# Patient Record
Sex: FEMALE | Race: WHITE | NOT HISPANIC OR LATINO | Employment: FULL TIME | URBAN - NONMETROPOLITAN AREA
[De-identification: names, ages, dates, MRNs, and addresses within clinical notes are randomized per-mention and may not be internally consistent; named-entity substitution may affect disease eponyms.]

---

## 2018-01-30 ENCOUNTER — DOCUMENTATION ONLY (OUTPATIENT)
Dept: FAMILY MEDICINE | Facility: OTHER | Age: 28
End: 2018-01-30

## 2018-01-30 PROBLEM — L25.9 CONTACT DERMATITIS AND ECZEMA: Status: ACTIVE | Noted: 2018-01-30

## 2018-01-30 PROBLEM — J30.1 ALLERGIC RHINITIS DUE TO POLLEN: Status: ACTIVE | Noted: 2018-01-30

## 2018-01-30 PROBLEM — N92.0 MENORRHAGIA: Status: ACTIVE | Noted: 2018-01-30

## 2018-01-30 RX ORDER — NAPROXEN 500 MG/1
500 TABLET ORAL 2 TIMES DAILY WITH MEALS
Status: ON HOLD | COMMUNITY
Start: 2014-01-09 | End: 2022-03-16

## 2018-01-30 RX ORDER — ONDANSETRON 8 MG/1
8 TABLET, ORALLY DISINTEGRATING ORAL EVERY 8 HOURS PRN
COMMUNITY
Start: 2017-03-28 | End: 2018-02-19

## 2018-01-30 RX ORDER — METHYLPHENIDATE HYDROCHLORIDE 40 MG/1
40 CAPSULE, EXTENDED RELEASE ORAL DAILY
COMMUNITY
Start: 2015-12-30 | End: 2018-02-20

## 2018-01-30 RX ORDER — TRIAMCINOLONE ACETONIDE 1 MG/G
CREAM TOPICAL
COMMUNITY
Start: 2015-06-15 | End: 2019-07-02

## 2018-01-30 RX ORDER — MEDROXYPROGESTERONE ACETATE 150 MG/ML
150 INJECTION, SUSPENSION INTRAMUSCULAR
COMMUNITY
Start: 2015-06-01 | End: 2018-04-23

## 2018-02-19 ENCOUNTER — OFFICE VISIT (OUTPATIENT)
Dept: INTERNAL MEDICINE | Facility: OTHER | Age: 28
End: 2018-02-19
Attending: NURSE PRACTITIONER
Payer: COMMERCIAL

## 2018-02-19 VITALS
BODY MASS INDEX: 37.21 KG/M2 | SYSTOLIC BLOOD PRESSURE: 130 MMHG | HEIGHT: 63 IN | DIASTOLIC BLOOD PRESSURE: 80 MMHG | TEMPERATURE: 97.6 F | WEIGHT: 210 LBS

## 2018-02-19 DIAGNOSIS — L98.492: Primary | ICD-10-CM

## 2018-02-19 PROBLEM — N92.1 MENORRHAGIA WITH IRREGULAR CYCLE: Status: ACTIVE | Noted: 2018-01-30

## 2018-02-19 PROBLEM — L92.8 OTHER GRANULOMATOUS DISORDERS OF THE SKIN AND SUBCUTANEOUS TISSUE: Status: ACTIVE | Noted: 2017-09-26

## 2018-02-19 PROBLEM — K62.89 RECTAL MASS: Status: ACTIVE | Noted: 2017-05-26

## 2018-02-19 PROBLEM — K62.89 MASS OF PERIRECTAL SOFT TISSUE: Status: ACTIVE | Noted: 2017-04-12

## 2018-02-19 PROCEDURE — 99203 OFFICE O/P NEW LOW 30 MIN: CPT | Performed by: NURSE PRACTITIONER

## 2018-02-19 PROCEDURE — G0463 HOSPITAL OUTPT CLINIC VISIT: HCPCS

## 2018-02-19 ASSESSMENT — PAIN SCALES - GENERAL: PAINLEVEL: SEVERE PAIN (6)

## 2018-02-19 NOTE — NURSING NOTE
Tia Vázquez is a 27 year old female who presents today for wound care consult for a surgical incision on her buttocks. She had surgery to remove her tail bone in June 2017.  Niya Matta LPN.......2/19/2018.......11:07 AM

## 2018-02-19 NOTE — MR AVS SNAPSHOT
After Visit Summary   2/19/2018    Tia Vázquez    MRN: [id number]           Patient Information     Date Of Birth          1990        Visit Information        Provider Department      2/19/2018 11:00 AM Kiki Hu NP [facility]         Follow-ups after your visit        Your next 10 appointments already scheduled     Feb 20, 2018  3:15 PM CST   Office Visit with Nicho Peoples MD   [facility] ([facility])    [address]  [facility] [id number] [phone]           Bring a current list of meds and any records pertaining to this visit. For Physicals, please bring immunization records and any forms needing to be filled out. Please arrive 10 minutes early to complete paperwork.            Feb 26, 2018  7:40 AM CST   SHORT with Kiki Hu NP   [facility] ([facility])    [address]  [place] [id number] [phone]              Who to contact     If you have questions or need follow up information about today's clinic visit or your schedule please contact [facility] directly at [phone].  Normal or non-critical lab and imaging results will be communicated to you by BrewDoghart, letter or phone within 4 business days after the clinic has received the results. If you do not hear from us within 7 days, please contact the clinic through BrewDoghart or phone. If you have a critical or abnormal lab result, we will notify you by phone as soon as possible.  Submit refill requests through Zentact or call your pharmacy and they will forward the refill request to us. Please allow 3 business days for your refill to be completed.          Additional Information About Your Visit        BrewDogharOctapoly Information     Zentact lets you send messages to your doctor, view your test results, renew your prescriptions, schedule  "appointments and more. To sign up, go to [email or web address] . Click on \"Log in\" on the left side of the screen, which will take you to the Welcome page. Then click on \"Sign up Now\" on the right side of the page.     You will be asked to enter the access code listed below, as well as some personal information. Please follow the directions to create your username and password.     Your access code is: [email or web address]  Expires: 2018 11:37 AM     Your access code will  in 90 days. If you need help or a new code, please call your [place] clinic or [phone].        Care EveryWhere ID     This is your Care EveryWhere ID. This could be used by other organizations to access your [place] medical records  [id number]        Your Vitals Were     Temperature Height Breastfeeding? BMI (Body Mass Index)          97.6  F (36.4  C) (Tympanic) 1.6 m (5' 3\") No 37.2 kg/m2         Blood Pressure from Last 3 Encounters:   18 130/80   06/05/15 124/82   14 132/86    Weight from Last 3 Encounters:   18 95.3 kg (210 lb)   06/05/15 73.6 kg (162 lb 3.2 oz)   14 64.4 kg (142 lb)              Today, you had the following     No orders found for display       Primary Care Provider Office Phone # Fax #    Neiva Dwight [phone]       [address] [address]        Equal Access to Services     [facility] : Hadii ana mercer hadasho Sojeanne, waaxda luqadaha, qaybta kaalmada adehamilton, stephanie quach . So [facility] [phone].    ATENCIÓN: Si habla español, tiene a harris disposición servicios gratuitos de asistencia lingüística. Llame al [phone].    We comply with applicable federal civil rights laws and [place] laws. We do not discriminate on the basis of race, color, national origin, age, disability, sex, sexual orientation, or gender identity.            Thank you!     Thank you for choosing [facility]  for " your care. Our goal is always to provide you with excellent care. Hearing back from our patients is one way we can continue to improve our services. Please take a few minutes to complete the written survey that you may receive in the mail after your visit with us. Thank you!             Your Updated Medication List - Protect others around you: Learn how to safely use, store and throw away your medicines at www.disposemymeds.org.          This list is accurate as of 2/19/18 11:37 AM.  Always use your most recent med list.                   Brand Name Dispense Instructions for use Diagnosis    medroxyPROGESTERone 150 MG/ML injection    DEPO-PROVERA     Inject 150 mg into the muscle every 3 months        methylphenidate 40 MG Cp24    RITALIN LA     Take 40 mg by mouth daily        naproxen 500 MG tablet    NAPROSYN     Take 500 mg by mouth 2 times daily (with meals)        triamcinolone 0.1 % cream    KENALOG     Apply  topically to affected area(s) 3 times daily.

## 2018-02-19 NOTE — PROGRESS NOTES
"CLINIC VISIT WOUND CARE    Subjective:  This is a followup of a 27 year old female patient with a evaluation and management of chronic ulcer of the sacral region.  Patient had a retro-rectal mass that was excised back in September 2017.  Incision did not heal appropriately.  She has been managed by wound care clinic in the [facility].  She just recently moved back to this area.  Her last visit at wound care clinic was on February 6.  On that date of service wound measures 2 x 0.5 x 0.5 cm.  Patient reports that she still has chronic pain of the area about a 6 out of 10.  She states she manages this pain by taking naproxen and by changing positions.  Sometimes she will apply gauze soaked with lidocaine to the area but only feels some numbness but really no improvement of the pain.  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  She denies fever and chills.  She feels that the drainage is improving.  She has had no recent wound infections.  She does dressing changes daily.  Last dressing change was done at home this morning by herself.  Normally her significant other does dressing change for her.    Past Medical History:   Diagnosis Date     Helicobacter pylori infection     No Comments Provided     Past Surgical History:   Procedure Laterality Date     OTHER SURGICAL HISTORY      46739.0,PAST SURGICAL HISTORY,Unremarkable     Other  [no clinical screening - see comments]  Current Outpatient Prescriptions   Medication     medroxyPROGESTERone (DEPO-PROVERA) 150 MG/ML injection     methylphenidate (RITALIN LA) 40 MG CP24     naproxen (NAPROSYN) 500 MG tablet     triamcinolone (KENALOG) 0.1 % cream     No current facility-administered medications for this visit.        Review of Systems:  Review of Systems  See HPI  Objective:   /80 (BP Location: Right arm, Patient Position: Sitting, Cuff Size: Adult Large)  Temp 97.6  F (36.4  C) (Tympanic)  Ht 5' 3\" (1.6 m)  Wt " 210 lb (95.3 kg)  Breastfeeding? No  BMI 37.2 kg/m2  Physical Exam     Wound Type:  Postsurgical  Location:  Gluteal crease  Wound Measurements:  2.4 x 0.4 x 0.4 cm  Description:  Beefy red granulating wound bed, no hyper granulation tissue.  Drainage:  Small to moderate amount of serosanguineous, nonodorous drainage.  Periwound Tissue:  No surrounding erythema, warmth or maceration.  Treatment:  Wound irrigated with saline wash, calcium alginate applied to wound followed by Allevyn gentle border light dressing.    Assessment:     ICD-10-CM    1. Chronic ulcer of sacral region with fat layer exposed (H) L98.492        Plan:   Patient's wound is healing nicely.  No hypergranulation tissue noted today at this visit.  Since this is her first visit and treatment was slightly changed then would recommend one-week follow-up to make sure wound is continuing to show improvement.  If she develops increased pain, fever, redness or warmth around wound or any purulent drainage needs to be seen sooner.  If ongoing while next week then will begin seeing her in clinic every 2-3 weeks.    KERMIT Presley   2/19/2018  11:41 AM

## 2018-02-20 ENCOUNTER — DOCUMENTATION ONLY (OUTPATIENT)
Dept: FAMILY MEDICINE | Facility: OTHER | Age: 28
End: 2018-02-20

## 2018-02-20 ENCOUNTER — OFFICE VISIT (OUTPATIENT)
Dept: FAMILY MEDICINE | Facility: OTHER | Age: 28
End: 2018-02-20
Attending: FAMILY MEDICINE
Payer: COMMERCIAL

## 2018-02-20 VITALS
HEART RATE: 104 BPM | DIASTOLIC BLOOD PRESSURE: 88 MMHG | WEIGHT: 211 LBS | SYSTOLIC BLOOD PRESSURE: 136 MMHG | HEIGHT: 63 IN | BODY MASS INDEX: 37.39 KG/M2

## 2018-02-20 DIAGNOSIS — D69.9 BLEEDING DISORDER (H): ICD-10-CM

## 2018-02-20 DIAGNOSIS — L98.492: ICD-10-CM

## 2018-02-20 DIAGNOSIS — Z79.899 CONTROLLED SUBSTANCE AGREEMENT SIGNED: ICD-10-CM

## 2018-02-20 DIAGNOSIS — F90.9 ATTENTION DEFICIT HYPERACTIVITY DISORDER (ADHD), UNSPECIFIED ADHD TYPE: Primary | ICD-10-CM

## 2018-02-20 PROBLEM — K62.89 RECTAL MASS: Status: RESOLVED | Noted: 2017-05-26 | Resolved: 2018-02-20

## 2018-02-20 PROCEDURE — G0463 HOSPITAL OUTPT CLINIC VISIT: HCPCS

## 2018-02-20 PROCEDURE — 99214 OFFICE O/P EST MOD 30 MIN: CPT | Performed by: FAMILY MEDICINE

## 2018-02-20 RX ORDER — METHYLPHENIDATE HYDROCHLORIDE 40 MG/1
40 CAPSULE, EXTENDED RELEASE ORAL DAILY
Qty: 30 CAPSULE | Refills: 0 | Status: SHIPPED | OUTPATIENT
Start: 2018-02-20 | End: 2018-04-24

## 2018-02-20 RX ORDER — METHYLPHENIDATE HYDROCHLORIDE 40 MG/1
40 CAPSULE, EXTENDED RELEASE ORAL DAILY
Qty: 30 CAPSULE | Refills: 0 | Status: SHIPPED | OUTPATIENT
Start: 2018-02-20 | End: 2019-06-10

## 2018-02-20 RX ORDER — METHYLPHENIDATE HYDROCHLORIDE 40 MG/1
40 CAPSULE, EXTENDED RELEASE ORAL DAILY
Qty: 30 CAPSULE | Refills: 0 | Status: SHIPPED | OUTPATIENT
Start: 2018-02-20 | End: 2018-02-20

## 2018-02-20 ASSESSMENT — PATIENT HEALTH QUESTIONNAIRE - PHQ9: SUM OF ALL RESPONSES TO PHQ QUESTIONS 1-9: 0

## 2018-02-20 ASSESSMENT — PAIN SCALES - GENERAL: PAINLEVEL: MODERATE PAIN (5)

## 2018-02-20 NOTE — NURSING NOTE
She is here today for medication refill.  Leanne Recinos LPN..................2/20/2018   2:57 PM

## 2018-02-20 NOTE — MR AVS SNAPSHOT
"              After Visit Summary   2/20/2018    Tia Vázquez    MRN: [id number]           Patient Information     Date Of Birth          1990        Visit Information        Provider Department      2/20/2018 3:15 PM Nicho Peoples MD [facility]        Today's Diagnoses     Attention deficit hyperactivity disorder (ADHD), unspecified ADHD type    -  1    Chronic ulcer of sacral region with fat layer exposed (H)        Controlled substance agreement signed        Bleeding disorder, not specified           Follow-ups after your visit        Your next 10 appointments already scheduled     Feb 26, 2018  7:40 AM CST   SHORT with Kiki Hu NP   [facility]    [address]  [place] [address] [phone]              Who to contact     If you have questions or need follow up information about today's clinic visit or your schedule please contact [facility] directly at [phone].  Normal or non-critical lab and imaging results will be communicated to you by Mobile Roadiehart, letter or phone within 4 business days after the clinic has received the results. If you do not hear from us within 7 days, please contact the clinic through DocDoct or phone. If you have a critical or abnormal lab result, we will notify you by phone as soon as possible.  Submit refill requests through The Luxe Nomad or call your pharmacy and they will forward the refill request to us. Please allow 3 business days for your refill to be completed.          Additional Information About Your Visit        Mobile Roadiehart Information     The Luxe Nomad lets you send messages to your doctor, view your test results, renew your prescriptions, schedule appointments and more. To sign up, go to [email or web address] . Click on \"Log in\" on the left side of the screen, which will take you to the Welcome page. Then click on \"Sign up Now\" on the " "right side of the page.     You will be asked to enter the access code listed below, as well as some personal information. Please follow the directions to create your username and password.     Your access code is: [email or web address]  Expires: 2018 11:37 AM     Your access code will  in 90 days. If you need help or a new code, please call your [place] clinic or [phone].        Care EveryWhere ID     This is your Care EveryWhere ID. This could be used by other organizations to access your [place] medical records  [id number]        Your Vitals Were     Pulse Height Breastfeeding? BMI (Body Mass Index)          104 5' 3\" (1.6 m) No 37.38 kg/m2         Blood Pressure from Last 3 Encounters:   18 136/88   18 130/80   06/05/15 124/82    Weight from Last 3 Encounters:   18 211 lb (95.7 kg)   18 210 lb (95.3 kg)   06/05/15 162 lb 3.2 oz (73.6 kg)              We Performed the Following     Pain Drug Scr UR W Rptd Meds          Today's Medication Changes          These changes are accurate as of 18  3:52 PM.  If you have any questions, ask your nurse or doctor.               Start taking these medicines.        Dose/Directions    * methylphenidate 40 MG Cp24   Commonly known as:  RITALIN LA   Used for:  Attention deficit hyperactivity disorder (ADHD), unspecified ADHD type, Controlled substance agreement signed   Started by:  Nicho Peoples MD        Dose:  40 mg   Take 40 mg by mouth daily   Quantity:  30 capsule   Refills:  0       * methylphenidate 40 MG Cp24   Commonly known as:  RITALIN LA   Used for:  Attention deficit hyperactivity disorder (ADHD), unspecified ADHD type, Controlled substance agreement signed   Started by:  Nciho Peoples MD        Dose:  40 mg   Take 40 mg by mouth daily   Quantity:  30 capsule   Refills:  0       * Notice:  This list has 2 medication(s) that are the same as other medications prescribed for you. Read the directions carefully, and ask " your doctor or other care provider to review them with you.         Where to get your medicines      Some of these will need a paper prescription and others can be bought over the counter.  Ask your nurse if you have questions.     Bring a paper prescription for each of these medications     methylphenidate 40 MG Cp24    methylphenidate 40 MG Cp24                Primary Care Provider Office Phone # Fax #    Belen Quintanilla [phone]       [address] [address]        Equal Access to Services     ROHINI WADE : Hadii aad ku hadasho Soomaali, waaxda luqadaha, qaybta kaalmada adeegyada, waxay idiin hayaan adeeg juliana quach . So [facility] [phone].    ATENCIÓN: Si habla español, tiene a harris disposición servicios gratuitos de asistencia lingüística. [facility] [phone].    We comply with applicable federal civil rights laws and [place] laws. We do not discriminate on the basis of race, color, national origin, age, disability, sex, sexual orientation, or gender identity.            Thank you!     Thank you for choosing [facility] AND [facility]  for your care. Our goal is always to provide you with excellent care. Hearing back from our patients is one way we can continue to improve our services. Please take a few minutes to complete the written survey that you may receive in the mail after your visit with us. Thank you!             Your Updated Medication List - Protect others around you: Learn how to safely use, store and throw away your medicines at www.disposemymeds.org.          This list is accurate as of 2/20/18  3:52 PM.  Always use your most recent med list.                   Brand Name Dispense Instructions for use Diagnosis    medroxyPROGESTERone 150 MG/ML injection    DEPO-PROVERA     Inject 150 mg into the muscle every 3 months        * methylphenidate 40 MG Cp24    RITALIN LA    30 capsule    Take 40 mg by mouth daily    Attention deficit  hyperactivity disorder (ADHD), unspecified ADHD type, Controlled substance agreement signed       * methylphenidate 40 MG Cp24    RITALIN LA    30 capsule    Take 40 mg by mouth daily    Attention deficit hyperactivity disorder (ADHD), unspecified ADHD type, Controlled substance agreement signed       naproxen 500 MG tablet    NAPROSYN     Take 500 mg by mouth 2 times daily (with meals)        triamcinolone 0.1 % cream    KENALOG     Apply  topically to affected area(s) 3 times daily.        * Notice:  This list has 2 medication(s) that are the same as other medications prescribed for you. Read the directions carefully, and ask your doctor or other care provider to review them with you.

## 2018-02-20 NOTE — PROGRESS NOTES
Nursing Notes:   Leanne Recinos LPN  2/20/2018  3:02 PM  Signed  She is here today for medication refill.  Leanne Recinos LPN..................2/20/2018   2:57 PM      SUBJECTIVE:  Tia Vázquez  is a 27 year old female who comes in today to reestablish care.  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  She has ADHD and we previously had treated her with Ritalin LA.  She is continued on 40 mg daily and it is worked well for her.    She had a retrorectal mass that was excised back in June 2017 at [facility] and the incision did not heal appropriately.  She has been managed by a wound clinic in the [facility] and recently moved back here.  She has been seen by Isabela Hu NP here yesterday.  She has had no recent infections and has been doing dressing changes daily.  She is going to see her once weekly for a while and every 2-3 weeks.    She has been getting Depo-Provera.  Had an elevated blood pressure at her last physical a couple weeks ago.  There was some concern of a bleeding disorder a few years ago because of easy bruising and menorrhagia.  She was seen by hematology and there was some question of a platelet function disorder even though the testing was normal.  She does have easy bruising and had more bleeding around the time of her surgery.    She is engaged and is getting  in July 2018.    Past Medical, Family, and Social History reviewed and updated as noted below.   ROS is negative except as noted above       Allergies   Allergen Reactions     Other  [No Clinical Screening - See Comments]      Other reaction(s): Throat Irritation  Carmex, Stephens, Icy Hot   ,   Family History   Problem Relation Age of Onset     CANCER Father      Cancer, h/o testicular CA     HEART DISEASE Maternal Grandmother      Heart Disease,cardiomyopathy     Blood Disease Maternal Grandmother      Blood Disease,blood dyscrasia   ,   Current Outpatient Prescriptions  "  Medication     methylphenidate (RITALIN LA) 40 MG CP24     methylphenidate (RITALIN LA) 40 MG CP24     medroxyPROGESTERone (DEPO-PROVERA) 150 MG/ML injection     naproxen (NAPROSYN) 500 MG tablet     triamcinolone (KENALOG) 0.1 % cream     No current facility-administered medications for this visit.    ,   Past Medical History:   Diagnosis Date     Helicobacter pylori infection     No Comments Provided   ,   Patient Active Problem List    Diagnosis Date Noted     Controlled substance agreement signed 02/20/2018     Priority: Medium     Allergic rhinitis due to pollen 01/30/2018     Priority: Medium     Overview:   Spring and fall       Contact dermatitis and eczema 01/30/2018     Priority: Medium     Overview:   Left ear       Menorrhagia with irregular cycle 01/30/2018     Priority: Medium     Chronic ulcer of sacral region with fat layer exposed (H) 09/26/2017     Priority: Medium     Other granulomatous disorders of the skin and subcutaneous tissue 09/26/2017     Priority: Medium     Mass of perirectal soft tissue 04/12/2017     Priority: Medium     Screening for malignant neoplasm of cervix 03/17/2016     Priority: Medium     Overview:   2016 NILM  PLAN: PAP TEST 3/2019  ; PAP TEST HISTORY       ADHD 09/24/2010     Priority: Medium     Overview:   Evaluation from [facility] [employer] [facility] done.     ,   Past Surgical History:   Procedure Laterality Date     exision mass retrorectal and coccyx  06/09/2017    Ori Espinoza MD at [facility]    and   Social History   Substance Use Topics     Smoking status: Never Smoker     Smokeless tobacco: Never Used     Alcohol use 2.5 oz/week     OBJECTIVE:  /88 (BP Location: Right arm, Patient Position: Sitting, Cuff Size: Adult Large)  Pulse 104  Ht 5' 3\" (1.6 m)  Wt 211 lb (95.7 kg)  Breastfeeding? No  BMI 37.38 kg/m2   EXAM:  Alert and cooperative, no distress.  Affect is appropriate ranging and appropriate.  Physical exam was not done today.  ASSESSMENT/Plan " :    Tia was seen today for recheck medication.    Diagnoses and all orders for this visit:    Attention deficit hyperactivity disorder (ADHD), unspecified ADHD type  -     Pain Drug Scr UR W Rptd Meds  -     methylphenidate (RITALIN LA) 40 MG CP24; Take 40 mg by mouth daily  -     Discontinue: methylphenidate (RITALIN LA) 40 MG CP24; Take 40 mg by mouth daily  -     methylphenidate (RITALIN LA) 40 MG CP24; Take 40 mg by mouth daily    Chronic ulcer of sacral region with fat layer exposed (H)    Controlled substance agreement signed  -     methylphenidate (RITALIN LA) 40 MG CP24; Take 40 mg by mouth daily  -     Discontinue: methylphenidate (RITALIN LA) 40 MG CP24; Take 40 mg by mouth daily  -     methylphenidate (RITALIN LA) 40 MG CP24; Take 40 mg by mouth daily    Bleeding disorder, not specified  Comments:  workup was unrevealing, but felt to have a platelet dysfunction disorder.     For ADHD, we discussed the clinic policy for controlled substances.  Contract is signed today.   query is appropriate.  ToxAssure was ordered but she just voided prior to coming to the clinic and did not know that she needed to do it.  She comes back to the wound clinic next week and will leave a urine at that time.  No concerns about inappropriate use.    She will continue to follow-up with wound clinic.  Will continue with Depo-Provera which she just got.  She is up-to-date on health-maintenance issues.  May have some type of a platelet dysfunction disorder but it does not cause her any significant issues at this time.      A total of 25 minutes was spent withthe patient, greater than 50% of the time was spent in counseling/discussion of the aforementioned concerns.     Nicho Peoples MD

## 2018-02-21 ENCOUNTER — TELEPHONE (OUTPATIENT)
Dept: FAMILY MEDICINE | Facility: OTHER | Age: 28
End: 2018-02-21

## 2018-02-21 ENCOUNTER — DOCUMENTATION ONLY (OUTPATIENT)
Dept: FAMILY MEDICINE | Facility: OTHER | Age: 28
End: 2018-02-21

## 2018-02-21 ASSESSMENT — PATIENT HEALTH QUESTIONNAIRE - PHQ9: SUM OF ALL RESPONSES TO PHQ QUESTIONS 1-9: 3

## 2018-02-26 ENCOUNTER — OFFICE VISIT (OUTPATIENT)
Dept: INTERNAL MEDICINE | Facility: OTHER | Age: 28
End: 2018-02-26
Attending: NURSE PRACTITIONER
Payer: COMMERCIAL

## 2018-02-26 VITALS
BODY MASS INDEX: 36.68 KG/M2 | WEIGHT: 207 LBS | TEMPERATURE: 96.3 F | SYSTOLIC BLOOD PRESSURE: 130 MMHG | DIASTOLIC BLOOD PRESSURE: 88 MMHG | HEIGHT: 63 IN

## 2018-02-26 DIAGNOSIS — L98.492: Primary | ICD-10-CM

## 2018-02-26 PROCEDURE — G0463 HOSPITAL OUTPT CLINIC VISIT: HCPCS

## 2018-02-26 PROCEDURE — 99213 OFFICE O/P EST LOW 20 MIN: CPT | Performed by: NURSE PRACTITIONER

## 2018-02-26 ASSESSMENT — PAIN SCALES - GENERAL: PAINLEVEL: MODERATE PAIN (5)

## 2018-02-26 NOTE — TELEPHONE ENCOUNTER
No return calls after messages left. This message will be filed.  Leanne Recinos LPN..................2/26/2018   11:06 AM

## 2018-02-26 NOTE — PROGRESS NOTES
"CLINIC VISIT WOUND CARE    Subjective:  This is a followup of a 27 year old female patient with a ulcer at the sacral region.  She states she still has some discomfort especially after showering before the dressing has been placed back on the wound.  Still has a moderate amount of drainage.  Her significant other continues to do dressing change for her daily.  Denies fever and chills.  Significant other has not noticed surrounding redness, warmth, maceration or purulent drainage.  Also alternates pressure to reduce discomfort.  She reports no concerns today.  She would like to extend out follow-up visits to 2 or 3 weeks.    Past Medical History:   Diagnosis Date     Helicobacter pylori infection     No Comments Provided     Past Surgical History:   Procedure Laterality Date     exision mass retrorectal and coccyx  06/09/2017    Ori Espinoza MD at [facility]     Other  [no clinical screening - see comments]; Cvs cleansing skin; Menthol; and Pine tar  Current Outpatient Prescriptions   Medication     methylphenidate (RITALIN LA) 40 MG CP24     methylphenidate (RITALIN LA) 40 MG CP24     medroxyPROGESTERone (DEPO-PROVERA) 150 MG/ML injection     naproxen (NAPROSYN) 500 MG tablet     triamcinolone (KENALOG) 0.1 % cream     No current facility-administered medications for this visit.        Review of Systems:  Review of Systems  See HPI    Objective:   /88 (BP Location: Right arm, Patient Position: Sitting, Cuff Size: Adult Large)  Temp 96.3  F (35.7  C) (Tympanic)  Ht 5' 3\" (1.6 m)  Wt 207 lb (93.9 kg)  Breastfeeding? No  BMI 36.67 kg/m2  Physical Exam   Pleasant young female, no acute distress.  Affect normal.  Alert and oriented ×4.    Wound Type:  Postsurgical wound  Location:  Sacrum, gluteal crease  Wound Measurements:  1.3 x 0.2 x 0.3 cm  Description:  Pink granulating wound bed  Drainage:  Moderate amount of serosanguineous, nonodorous drainage  Periwound Tissue:  No surrounding erythema, warmth " or maceration.  Treatment:  Wound irrigated with saline wash, calcium alginate placed in the wound followed by 3 inch Allevyn gentle border light dressing.    Assessment:     ICD-10-CM    1. Chronic ulcer of sacral region with fat layer exposed (H) L98.492        Plan:   Continue with daily wound care.  Follow-up in 2 weeks.  If in 2 weeks time she feels wound is continuing to heal well without any problems she may extend this out to 3 weeks.  Follow-up sooner with increased pain, purulent drainage or surrounding redness or warmth fever or chills    KERMIT Presley   2/26/2018  8:01 AM

## 2018-02-26 NOTE — MR AVS SNAPSHOT
"              After Visit Summary   2/26/2018    Tia Vázquez    MRN: [id number]           Patient Information     Date Of Birth          1990        Visit Information        Provider Department      2/26/2018 7:40 AM Kiki Hu NP [facility]        Today's Diagnoses     Chronic ulcer of sacral region with fat layer exposed (H)    -  1       Follow-ups after your visit        Your next 10 appointments already scheduled     Mar 12, 2018  7:40 AM CDT   SHORT with Kiki Hu NP   [facility]    [address]  [place] [address] [phone]              Who to contact     If you have questions or need follow up information about today's clinic visit or your schedule please contact [facility] directly at [phone].  Normal or non-critical lab and imaging results will be communicated to you by [employer], letter or phone within 4 business days after the clinic has received the results. If you do not hear from us within 7 days, please contact the clinic through [employer] or phone. If you have a critical or abnormal lab result, we will notify you by phone as soon as possible.  Submit refill requests through [employer] or call your pharmacy and they will forward the refill request to us. Please allow 3 business days for your refill to be completed.          Additional Information About Your Visit        MyChart Information     [employer] lets you send messages to your doctor, view your test results, renew your prescriptions, schedule appointments and more. To sign up, go to [email or web address] . Click on \"Log in\" on the left side of the screen, which will take you to the Welcome page. Then click on \"Sign up Now\" on the right side of the page.     You will be asked to enter the access code listed below, as well as some personal information. Please follow the directions to create " "your username and password.     Your access code is: [email or web address]  Expires: 2018 11:37 AM     Your access code will  in 90 days. If you need help or a new code, please call your [facility] or [phone].        Care EveryWhere ID     This is your Care EveryWhere ID. This could be used by other organizations to access your [facility] medical records  [id number]        Your Vitals Were     Temperature Height Breastfeeding? BMI (Body Mass Index)          96.3  F (35.7  C) (Tympanic) 5' 3\" (1.6 m) No 36.67 kg/m2         Blood Pressure from Last 3 Encounters:   18 130/88   18 136/88   18 130/80    Weight from Last 3 Encounters:   18 207 lb (93.9 kg)   18 211 lb (95.7 kg)   18 210 lb (95.3 kg)              Today, you had the following     No orders found for display       Primary Care Provider Office Phone # Fax #    Belen Quintanilla [phone]       [address] [address]        Equal Access to Services     ROHINI WADE AH: Hadii ana mercer hadasho Soomaali, waaxda luqadaha, qaybta kaalmada adeegyada, stephanie maki. So [facility] [phone].    ATENCIÓN: Si habla español, tiene a harris disposición servicios gratuitos de asistencia lingüística. Llame al [phone].    We comply with applicable federal civil rights laws and [place] laws. We do not discriminate on the basis of race, color, national origin, age, disability, sex, sexual orientation, or gender identity.            Thank you!     Thank you for choosing [facility] AND [facility]  for your care. Our goal is always to provide you with excellent care. Hearing back from our patients is one way we can continue to improve our services. Please take a few minutes to complete the written survey that you may receive in the mail after your visit with us. Thank you!             Your Updated Medication List - Protect others around you: Learn how to " safely use, store and throw away your medicines at www.disposemymeds.org.          This list is accurate as of 2/26/18  8:04 AM.  Always use your most recent med list.                   Brand Name Dispense Instructions for use Diagnosis    medroxyPROGESTERone 150 MG/ML injection    DEPO-PROVERA     Inject 150 mg into the muscle every 3 months        * methylphenidate 40 MG Cp24    RITALIN LA    30 capsule    Take 40 mg by mouth daily    Attention deficit hyperactivity disorder (ADHD), unspecified ADHD type, Controlled substance agreement signed       * methylphenidate 40 MG Cp24    RITALIN LA    30 capsule    Take 40 mg by mouth daily    Attention deficit hyperactivity disorder (ADHD), unspecified ADHD type, Controlled substance agreement signed       naproxen 500 MG tablet    NAPROSYN     Take 500 mg by mouth 2 times daily (with meals)        triamcinolone 0.1 % cream    KENALOG     Apply  topically to affected area(s) 3 times daily.        * Notice:  This list has 2 medication(s) that are the same as other medications prescribed for you. Read the directions carefully, and ask your doctor or other care provider to review them with you.

## 2018-02-27 ASSESSMENT — PATIENT HEALTH QUESTIONNAIRE - PHQ9: SUM OF ALL RESPONSES TO PHQ QUESTIONS 1-9: 0

## 2018-03-12 ENCOUNTER — OFFICE VISIT (OUTPATIENT)
Dept: INTERNAL MEDICINE | Facility: OTHER | Age: 28
End: 2018-03-12
Attending: NURSE PRACTITIONER
Payer: COMMERCIAL

## 2018-03-12 VITALS
HEART RATE: 80 BPM | BODY MASS INDEX: 36.22 KG/M2 | DIASTOLIC BLOOD PRESSURE: 72 MMHG | SYSTOLIC BLOOD PRESSURE: 128 MMHG | TEMPERATURE: 97.3 F | HEIGHT: 63 IN | RESPIRATION RATE: 18 BRPM | WEIGHT: 204.44 LBS

## 2018-03-12 DIAGNOSIS — L98.492: Primary | ICD-10-CM

## 2018-03-12 PROCEDURE — G0463 HOSPITAL OUTPT CLINIC VISIT: HCPCS | Performed by: NURSE PRACTITIONER

## 2018-03-12 PROCEDURE — 99213 OFFICE O/P EST LOW 20 MIN: CPT | Performed by: NURSE PRACTITIONER

## 2018-03-12 PROCEDURE — G0463 HOSPITAL OUTPT CLINIC VISIT: HCPCS

## 2018-03-12 RX ORDER — TRANSPARENT DRESSING 2.37X2.75"
BANDAGE TOPICAL
Qty: 3 EACH | Refills: 3 | Status: SHIPPED | OUTPATIENT
Start: 2018-03-12 | End: 2018-11-26

## 2018-03-12 RX ORDER — FOAM BANDAGE 2" X 2"
1 BANDAGE TOPICAL DAILY
Qty: 30 EACH | Refills: 3 | Status: SHIPPED | OUTPATIENT
Start: 2018-03-12 | End: 2018-11-26

## 2018-03-12 ASSESSMENT — ANXIETY QUESTIONNAIRES
GAD7 TOTAL SCORE: 0
3. WORRYING TOO MUCH ABOUT DIFFERENT THINGS: NOT AT ALL
7. FEELING AFRAID AS IF SOMETHING AWFUL MIGHT HAPPEN: NOT AT ALL
6. BECOMING EASILY ANNOYED OR IRRITABLE: NOT AT ALL
2. NOT BEING ABLE TO STOP OR CONTROL WORRYING: NOT AT ALL
1. FEELING NERVOUS, ANXIOUS, OR ON EDGE: NOT AT ALL
IF YOU CHECKED OFF ANY PROBLEMS ON THIS QUESTIONNAIRE, HOW DIFFICULT HAVE THESE PROBLEMS MADE IT FOR YOU TO DO YOUR WORK, TAKE CARE OF THINGS AT HOME, OR GET ALONG WITH OTHER PEOPLE: NOT DIFFICULT AT ALL
5. BEING SO RESTLESS THAT IT IS HARD TO SIT STILL: NOT AT ALL

## 2018-03-12 ASSESSMENT — PATIENT HEALTH QUESTIONNAIRE - PHQ9: 5. POOR APPETITE OR OVEREATING: NOT AT ALL

## 2018-03-12 ASSESSMENT — PAIN SCALES - GENERAL: PAINLEVEL: NO PAIN (0)

## 2018-03-12 NOTE — NURSING NOTE
Patient presents to clinic for wound care after having coccyx removed.    Shannan Aguirre LPN............3/12/2018 7:55 AM

## 2018-03-12 NOTE — MR AVS SNAPSHOT
"              After Visit Summary   3/12/2018    Tia Vázquez    MRN: [id number]           Patient Information     Date Of Birth          1990        Visit Information        Provider Department      3/12/2018 7:40 AM Kiki Hu NP [facility]        Today's Diagnoses     Chronic ulcer of sacral region with fat layer exposed (H)    -  1       Follow-ups after your visit        Your next 10 appointments already scheduled     Apr 02, 2018  7:40 AM CDT   SHORT with Kiki Hu NP   [facility]    [address]  [place] [address] [phone]              Who to contact     If you have questions or need follow up information about today's clinic visit or your schedule please contact [facility] directly at [phone].  Normal or non-critical lab and imaging results will be communicated to you by [employer])hart, letter or phone within 4 business days after the clinic has received the results. If you do not hear from us within 7 days, please contact the clinic through Wh[employer]hart or phone. If you have a critical or abnormal lab result, we will notify you by phone as soon as possible.  Submit refill requests through 123ContactForm or call your pharmacy and they will forward the refill request to us. Please allow 3 business days for your refill to be completed.          Additional Information About Your Visit        MyChart Information     123ContactForm lets you send messages to your doctor, view your test results, renew your prescriptions, schedule appointments and more. To sign up, go to [email or web address] . Click on \"Log in\" on the left side of the screen, which will take you to the Welcome page. Then click on \"Sign up Now\" on the right side of the page.     You will be asked to enter the access code listed below, as well as some personal information. Please follow the directions to create " "your username and password.     Your access code is: [email or web address]  Expires: 2018 12:37 PM     Your access code will  in 90 days. If you need help or a new code, please call your [facility] or [phone].        Care EveryWhere ID     This is your Care EveryWhere ID. This could be used by other organizations to access your [facility] medical records  [id number]        Your Vitals Were     Pulse Temperature Respirations Height Breastfeeding? BMI (Body Mass Index)    80 97.3  F (36.3  C) (Tympanic) 18 5' 3\" (1.6 m) No 36.21 kg/m2       Blood Pressure from Last 3 Encounters:   18 128/72   18 130/88   18 136/88    Weight from Last 3 Encounters:   18 204 lb 7 oz (92.7 kg)   18 207 lb (93.9 kg)   18 211 lb (95.7 kg)              Today, you had the following     No orders found for display         Today's Medication Changes          These changes are accurate as of 3/12/18  8:13 AM.  If you have any questions, ask your nurse or doctor.               Start taking these medicines.        Dose/Directions    ALGISITE M 4\"X4\" Misc   Used for:  Chronic ulcer of sacral region with fat layer exposed (H)   Started by:  Kiki Hu NP        Apply to wound with daily dressing change   Quantity:  3 each   Refills:  3       ALLEVYN GENTLE BORDER LITE Pads   Used for:  Chronic ulcer of sacral region with fat layer exposed (H)   Started by:  Kiki Hu NP        Dose:  1 Units   Externally apply 1 Units topically daily Size of Dressing: 3   inch   Quantity:  30 each   Refills:  3       sodium chloride 0.9 % Soln   Used for:  Chronic ulcer of sacral region with fat layer exposed (H)   Started by:  Kiki Hu NP        Cleanse wound with dressing changes   Quantity:  1 each   Refills:  3            Where to get your medicines      These medications were sent to [facility] - [place], [place] - [address]  [address] "[place] [address]     Phone:  [phone]     ALGISITE M 4\"X4\" Misc    ALLEVYN GENTLE BORDER LITE Pads    sodium chloride 0.9 % Soln                Primary Care Provider Office Phone # Fax #    Belen Quintanilla [phone]       [address] [address]        Equal Access to Services     ROHINI WADE : Hadii aad ku hadasho Soomaali, waaxda luqadaha, qaybta kaalmada adeegyada, waxay idiin hayaan adeeg kharash la'aan ah. So [facility] [phone].    ATENCIÓN: Si rk dowell, tiene a harris disposición servicios gratuitos de asistencia lingüística. Lailabooker al [phone].    We comply with applicable federal civil rights laws and [place] laws. We do not discriminate on the basis of race, color, national origin, age, disability, sex, sexual orientation, or gender identity.            Thank you!     Thank you for choosing [facility] AND [facility]  for your care. Our goal is always to provide you with excellent care. Hearing back from our patients is one way we can continue to improve our services. Please take a few minutes to complete the written survey that you may receive in the mail after your visit with us. Thank you!             Your Updated Medication List - Protect others around you: Learn how to safely use, store and throw away your medicines at www.disposemymeds.org.          This list is accurate as of 3/12/18  8:13 AM.  Always use your most recent med list.                   Brand Name Dispense Instructions for use Diagnosis    ALGISITE M 4\"X4\" Misc     3 each    Apply to wound with daily dressing change    Chronic ulcer of sacral region with fat layer exposed (H)       ALLEVYN GENTLE BORDER LITE Pads     30 each    Externally apply 1 Units topically daily Size of Dressing: 3   inch    Chronic ulcer of sacral region with fat layer exposed (H)       medroxyPROGESTERone 150 MG/ML injection    DEPO-PROVERA     Inject 150 mg into the muscle every 3 months     "    * methylphenidate 40 MG Cp24    RITALIN LA    30 capsule    Take 40 mg by mouth daily    Attention deficit hyperactivity disorder (ADHD), unspecified ADHD type, Controlled substance agreement signed       * methylphenidate 40 MG Cp24    RITALIN LA    30 capsule    Take 40 mg by mouth daily    Attention deficit hyperactivity disorder (ADHD), unspecified ADHD type, Controlled substance agreement signed       naproxen 500 MG tablet    NAPROSYN     Take 500 mg by mouth 2 times daily (with meals)        sodium chloride 0.9 % Soln     1 each    Cleanse wound with dressing changes    Chronic ulcer of sacral region with fat layer exposed (H)       triamcinolone 0.1 % cream    KENALOG     Apply  topically to affected area(s) 3 times daily.        * Notice:  This list has 2 medication(s) that are the same as other medications prescribed for you. Read the directions carefully, and ask your doctor or other care provider to review them with you.

## 2018-03-12 NOTE — PROGRESS NOTES
"CLINIC VISIT WOUND CARE    Subjective:  This is a followup of a 27 year old female patient with a ulcer at the sacral region.  She continues to have her significant other do dressing change daily.  There is still a moderate amount of pink drainage.  Pain is improving.  Denies fever and chills.  Offloading pressure.  She needs more dressing sent to pharmacy.  She would also like to use a smaller outer bandage.    Past Medical History:   Diagnosis Date     Helicobacter pylori infection     No Comments Provided     Past Surgical History:   Procedure Laterality Date     exision mass retrorectal and coccyx  06/09/2017    Ori Espinoza MD at [facility]     Other  [no clinical screening - see comments]; Cvs cleansing skin; Menthol; and Pine tar  Current Outpatient Prescriptions   Medication     Wound Dressings (ALLEVYN GENTLE BORDER LITE) PADS     Calcium Alginate (ALGISITE M 4\"X4\") MISC     sodium chloride 0.9 % SOLN     methylphenidate (RITALIN LA) 40 MG CP24     methylphenidate (RITALIN LA) 40 MG CP24     medroxyPROGESTERone (DEPO-PROVERA) 150 MG/ML injection     naproxen (NAPROSYN) 500 MG tablet     triamcinolone (KENALOG) 0.1 % cream     No current facility-administered medications for this visit.        Review of Systems:  Review of Systems  See HPI    Objective:   /72 (BP Location: Right arm, Patient Position: Sitting, Cuff Size: Adult Large)  Pulse 80  Temp 97.3  F (36.3  C) (Tympanic)  Resp 18  Ht 5' 3\" (1.6 m)  Wt 204 lb 7 oz (92.7 kg)  Breastfeeding? No  BMI 36.21 kg/m2  Physical Exam   Pleasant young female, no acute distress.  Affect normal.  Alert and oriented ×4.    Wound Type:  Postsurgical wound  Location:  Sacrum, gluteal crease  Wound Measurements:  1 x 0.2 x 0.2 cm  Description:  Pink granulating wound bed  Drainage:  Moderate amount of serosanguineous, nonodorous drainage  Periwound Tissue:  No surrounding erythema, warmth or maceration.  Treatment:  Wound irrigated with saline wash, " "calcium alginate placed in the wound followed by 3 inch Allevyn gentle border light dressing.    Assessment:     ICD-10-CM    1. Chronic ulcer of sacral region with fat layer exposed (H) L98.492 Wound Dressings (ALLEVYN GENTLE BORDER LITE) PADS     Calcium Alginate (ALGISITE M 4\"X4\") MISC     sodium chloride 0.9 % SOLN       Plan:   Continue with daily wound care.  Follow-up in 3 weeks.  .  Follow-up sooner with increased pain, purulent drainage or surrounding redness or warmth fever or chills.  Dressing supplies sent to pharmacy.  Instead of using a 4 inch secondary dressing, she will use 3 inch.    KERMIT Presley   3/12/2018  8:13 AM    "

## 2018-03-13 ASSESSMENT — ANXIETY QUESTIONNAIRES: GAD7 TOTAL SCORE: 0

## 2018-04-02 ENCOUNTER — OFFICE VISIT (OUTPATIENT)
Dept: INTERNAL MEDICINE | Facility: OTHER | Age: 28
End: 2018-04-02
Attending: NURSE PRACTITIONER
Payer: COMMERCIAL

## 2018-04-02 VITALS
SYSTOLIC BLOOD PRESSURE: 128 MMHG | BODY MASS INDEX: 35.97 KG/M2 | DIASTOLIC BLOOD PRESSURE: 88 MMHG | HEIGHT: 63 IN | TEMPERATURE: 95.6 F | WEIGHT: 203 LBS

## 2018-04-02 DIAGNOSIS — L98.492: Primary | ICD-10-CM

## 2018-04-02 PROCEDURE — 99213 OFFICE O/P EST LOW 20 MIN: CPT | Performed by: NURSE PRACTITIONER

## 2018-04-02 PROCEDURE — G0463 HOSPITAL OUTPT CLINIC VISIT: HCPCS

## 2018-04-02 ASSESSMENT — PAIN SCALES - GENERAL: PAINLEVEL: MILD PAIN (3)

## 2018-04-02 NOTE — PROGRESS NOTES
"CLINIC VISIT WOUND CARE    Subjective:  This is a followup of a 27 year old female patient with a ulcer at the sacral region.  She continues to have her significant other do dressing change daily.  She did dressing change herself this morning.  There is still a moderate amount of pink drainage.  Pain is improving and now is rare.  Denies fever and chills.  Offloading pressure.      Past Medical History:   Diagnosis Date     Helicobacter pylori infection     No Comments Provided     Past Surgical History:   Procedure Laterality Date     exision mass retrorectal and coccyx  06/09/2017    Ori Espinoza MD at [facility]     Other  [no clinical screening - see comments]; Cvs cleansing skin; Menthol; and Pine tar  Current Outpatient Prescriptions   Medication     Wound Dressings (ALLEVYN GENTLE BORDER LITE) PADS     Calcium Alginate (ALGISITE M 4\"X4\") MISC     sodium chloride 0.9 % SOLN     methylphenidate (RITALIN LA) 40 MG CP24     methylphenidate (RITALIN LA) 40 MG CP24     medroxyPROGESTERone (DEPO-PROVERA) 150 MG/ML injection     naproxen (NAPROSYN) 500 MG tablet     triamcinolone (KENALOG) 0.1 % cream     No current facility-administered medications for this visit.        Review of Systems:  Review of Systems  See HPI    Objective:   /88 (BP Location: Right arm, Patient Position: Sitting, Cuff Size: Adult Large)  Temp 95.6  F (35.3  C)  Ht 5' 3\" (1.6 m)  Wt 203 lb (92.1 kg)  Breastfeeding? No  BMI 35.96 kg/m2  Physical Exam   Pleasant young female, no acute distress.  Affect normal.  Alert and oriented ×4.    Wound Type:  Postsurgical wound  Location:  Sacrum, gluteal crease  Wound Measurements:  1 x 0.3 cm  Description:  Dark pink granulating wound bed  Drainage:  Moderate amount of serosanguineous, nonodorous drainage  Periwound Tissue:  No surrounding erythema, warmth or maceration.  Treatment:  Wound irrigated with saline wash, calcium alginate placed in the wound followed by 3 inch Allevyn " gentle border light dressing.    Assessment:     ICD-10-CM    1. Chronic ulcer of sacral region with fat layer exposed (H) L98.492        Plan:   Continue with daily wound care.  Continue to pack well to prevent hyper granulation tissue.  Follow-up in 3 weeks.  .  Follow-up sooner with increased pain, purulent drainage or surrounding redness or warmth fever or chills.      KERMIT Presley   4/2/2018  7:59 AM

## 2018-04-02 NOTE — MR AVS SNAPSHOT
"              After Visit Summary   4/2/2018    iTa Vázquez    MRN: [id number]           Patient Information     Date Of Birth          1990        Visit Information        Provider Department      4/2/2018 7:40 AM Kiki Hu NP [facility]        Today's Diagnoses     Chronic ulcer of sacral region with fat layer exposed (H)    -  1       Follow-ups after your visit        Your next 10 appointments already scheduled     Apr 23, 2018  7:40 AM CDT   SHORT with Kiki Hu NP   [facility]    [address]  [place] [address] [phone]              Who to contact     If you have questions or need follow up information about today's clinic visit or your schedule please contact [facility] directly at [phone].  Normal or non-critical lab and imaging results will be communicated to you by "[employer]hart, letter or phone within 4 business days after the clinic has received the results. If you do not hear from us within 7 days, please contact the clinic through "[employer]hart or phone. If you have a critical or abnormal lab result, we will notify you by phone as soon as possible.  Submit refill requests through [employer] or call your pharmacy and they will forward the refill request to us. Please allow 3 business days for your refill to be completed.          Additional Information About Your Visit        MyChart Information     [employer] lets you send messages to your doctor, view your test results, renew your prescriptions, schedule appointments and more. To sign up, go to [email or web address] . Click on \"Log in\" on the left side of the screen, which will take you to the Welcome page. Then click on \"Sign up Now\" on the right side of the page.     You will be asked to enter the access code listed below, as well as some personal information. Please follow the directions to create " "your username and password.     Your access code is: [email or web address]  Expires: 2018 12:37 PM     Your access code will  in 90 days. If you need help or a new code, please call your [facility] or [phone].        Care EveryWhere ID     This is your Care EveryWhere ID. This could be used by other organizations to access your [place] medical records  [id number]        Your Vitals Were     Temperature Height Breastfeeding? BMI (Body Mass Index)          95.6  F (35.3  C) 5' 3\" (1.6 m) No 35.96 kg/m2         Blood Pressure from Last 3 Encounters:   18 128/88   18 128/72   18 130/88    Weight from Last 3 Encounters:   18 203 lb (92.1 kg)   18 204 lb 7 oz (92.7 kg)   18 207 lb (93.9 kg)              Today, you had the following     No orders found for display       Primary Care Provider Office Phone # Fax #    Belen Quintanilla [phone]       [place] [address] [address] [facility] [address]        Equal Access to Services     ROHINI WADE AH: Hadii ana figueroao Soalexandraali, waaxda luqadaha, qaybta kaalmada adeegyada, stephanie maki. So [facility] [phone].    ATENCIÓN: Si habla español, tiene a harris disposición servicios gratuitos de asistencia lingüística. Llame al [phone].    We comply with applicable federal civil rights laws and [place] laws. We do not discriminate on the basis of race, color, national origin, age, disability, sex, sexual orientation, or gender identity.            Thank you!     Thank you for choosing [facility] AND [facility]  for your care. Our goal is always to provide you with excellent care. Hearing back from our patients is one way we can continue to improve our services. Please take a few minutes to complete the written survey that you may receive in the mail after your visit with us. Thank you!             Your Updated Medication List - Protect others around you: Learn how to safely " "use, store and throw away your medicines at www.disposemymeds.org.          This list is accurate as of 4/2/18  7:59 AM.  Always use your most recent med list.                   Brand Name Dispense Instructions for use Diagnosis    ALGISITE M 4\"X4\" Misc     3 each    Apply to wound with daily dressing change    Chronic ulcer of sacral region with fat layer exposed (H)       ALLEVYN GENTLE BORDER LITE Pads     30 each    Externally apply 1 Units topically daily Size of Dressing: 3   inch    Chronic ulcer of sacral region with fat layer exposed (H)       medroxyPROGESTERone 150 MG/ML injection    DEPO-PROVERA     Inject 150 mg into the muscle every 3 months        * methylphenidate 40 MG Cp24    RITALIN LA    30 capsule    Take 40 mg by mouth daily    Attention deficit hyperactivity disorder (ADHD), unspecified ADHD type, Controlled substance agreement signed       * methylphenidate 40 MG Cp24    RITALIN LA    30 capsule    Take 40 mg by mouth daily    Attention deficit hyperactivity disorder (ADHD), unspecified ADHD type, Controlled substance agreement signed       naproxen 500 MG tablet    NAPROSYN     Take 500 mg by mouth 2 times daily (with meals)        sodium chloride 0.9 % Soln     1 each    Cleanse wound with dressing changes    Chronic ulcer of sacral region with fat layer exposed (H)       triamcinolone 0.1 % cream    KENALOG     Apply  topically to affected area(s) 3 times daily.        * Notice:  This list has 2 medication(s) that are the same as other medications prescribed for you. Read the directions carefully, and ask your doctor or other care provider to review them with you.      "

## 2018-04-03 ASSESSMENT — PATIENT HEALTH QUESTIONNAIRE - PHQ9: SUM OF ALL RESPONSES TO PHQ QUESTIONS 1-9: 0

## 2018-04-23 ENCOUNTER — OFFICE VISIT (OUTPATIENT)
Dept: INTERNAL MEDICINE | Facility: OTHER | Age: 28
End: 2018-04-23
Attending: NURSE PRACTITIONER
Payer: COMMERCIAL

## 2018-04-23 VITALS
HEART RATE: 64 BPM | SYSTOLIC BLOOD PRESSURE: 130 MMHG | TEMPERATURE: 96.5 F | DIASTOLIC BLOOD PRESSURE: 86 MMHG | BODY MASS INDEX: 36.23 KG/M2 | WEIGHT: 204.5 LBS

## 2018-04-23 DIAGNOSIS — N92.1 MENORRHAGIA WITH IRREGULAR CYCLE: Primary | ICD-10-CM

## 2018-04-23 DIAGNOSIS — L98.492: ICD-10-CM

## 2018-04-23 PROCEDURE — 25000128 H RX IP 250 OP 636: Performed by: NURSE PRACTITIONER

## 2018-04-23 PROCEDURE — 99213 OFFICE O/P EST LOW 20 MIN: CPT | Performed by: NURSE PRACTITIONER

## 2018-04-23 PROCEDURE — 96372 THER/PROPH/DIAG INJ SC/IM: CPT | Performed by: NURSE PRACTITIONER

## 2018-04-23 RX ORDER — MEDROXYPROGESTERONE ACETATE 150 MG/ML
150 INJECTION, SUSPENSION INTRAMUSCULAR ONCE
Status: CANCELLED | OUTPATIENT
Start: 2018-04-23 | End: 2018-04-23

## 2018-04-23 RX ORDER — MEDROXYPROGESTERONE ACETATE 150 MG/ML
150 INJECTION, SUSPENSION INTRAMUSCULAR ONCE
Status: COMPLETED | OUTPATIENT
Start: 2018-04-23 | End: 2018-04-23

## 2018-04-23 RX ORDER — ADHESIVE BANDAGE 2"X3.5"
1 BANDAGE TOPICAL DAILY
Qty: 30 EACH | Refills: 3 | Status: SHIPPED | OUTPATIENT
Start: 2018-04-23 | End: 2018-11-26

## 2018-04-23 RX ORDER — MEDROXYPROGESTERONE ACETATE 150 MG/ML
150 INJECTION, SUSPENSION INTRAMUSCULAR
Qty: 0.9 ML | Refills: 0 | OUTPATIENT
Start: 2018-04-23 | End: 2018-04-24

## 2018-04-23 RX ORDER — MEDROXYPROGESTERONE ACETATE 150 MG/ML
150 INJECTION, SUSPENSION INTRAMUSCULAR
Qty: 1 ML | Refills: 0 | OUTPATIENT
Start: 2018-04-23 | End: 2018-11-26

## 2018-04-23 RX ADMIN — MEDROXYPROGESTERONE ACETATE 150 MG: 150 INJECTION, SUSPENSION INTRAMUSCULAR at 11:44

## 2018-04-23 ASSESSMENT — ANXIETY QUESTIONNAIRES
5. BEING SO RESTLESS THAT IT IS HARD TO SIT STILL: NOT AT ALL
1. FEELING NERVOUS, ANXIOUS, OR ON EDGE: NOT AT ALL
7. FEELING AFRAID AS IF SOMETHING AWFUL MIGHT HAPPEN: NOT AT ALL
GAD7 TOTAL SCORE: 0
6. BECOMING EASILY ANNOYED OR IRRITABLE: NOT AT ALL
3. WORRYING TOO MUCH ABOUT DIFFERENT THINGS: NOT AT ALL
IF YOU CHECKED OFF ANY PROBLEMS ON THIS QUESTIONNAIRE, HOW DIFFICULT HAVE THESE PROBLEMS MADE IT FOR YOU TO DO YOUR WORK, TAKE CARE OF THINGS AT HOME, OR GET ALONG WITH OTHER PEOPLE: NOT DIFFICULT AT ALL
2. NOT BEING ABLE TO STOP OR CONTROL WORRYING: NOT AT ALL

## 2018-04-23 ASSESSMENT — PATIENT HEALTH QUESTIONNAIRE - PHQ9: 5. POOR APPETITE OR OVEREATING: NOT AT ALL

## 2018-04-23 ASSESSMENT — PAIN SCALES - GENERAL: PAINLEVEL: MILD PAIN (2)

## 2018-04-23 NOTE — PROGRESS NOTES
"CLINIC VISIT WOUND CARE    Subjective:  This is a followup of a 27 year old female patient with a ulcer at the sacral region.  She continues to have her significant other do dressing change daily.  She did dressing change herself this morning.  There is still a small amount of pink drainage.  Pain is rare.  Denies fever and chills.  Offloading pressure.    She is also in need of Depo-Provera injection.  She has history of menorrhagia.    Past Medical History:   Diagnosis Date     Helicobacter pylori infection     No Comments Provided     Past Surgical History:   Procedure Laterality Date     exision mass retrorectal and coccyx  06/09/2017    Ori Espinoza MD at [facility]     Other  [no clinical screening - see comments]; Cvs cleansing skin; Menthol; and Pine tar  Current Outpatient Prescriptions   Medication     Calcium Alginate (ALGISITE M 4\"X4\") MISC     Gauze Pads & Dressings (T[place] DRESSING) 2\"X3-3/4\" PADS     medroxyPROGESTERone (DEPO-PROVERA) 150 MG/ML injection     methylphenidate (RITALIN LA) 40 MG CP24     methylphenidate (RITALIN LA) 40 MG CP24     naproxen (NAPROSYN) 500 MG tablet     sodium chloride 0.9 % SOLN     triamcinolone (KENALOG) 0.1 % cream     Wound Dressings (ALLEVYN GENTLE BORDER LITE) PADS     [DISCONTINUED] medroxyPROGESTERone (DEPO-PROVERA) 150 MG/ML injection     No current facility-administered medications for this visit.        Review of Systems:  Review of Systems  See HPI    Objective:   /86 (BP Location: Right arm, Patient Position: Sitting, Cuff Size: Adult Regular)  Pulse 64  Temp 96.5  F (35.8  C) (Tympanic)  Wt 204 lb 8 oz (92.8 kg)  BMI 36.23 kg/m2  Physical Exam   Pleasant young female, no acute distress.  Affect normal.  Alert and oriented ×4.    Wound Type:  Postsurgical wound  Location:  Sacrum, gluteal crease  Wound Measurements:  1 x 0.2 x 0.2 cm  Description:  Dark pink granulating wound bed with hyper granulation tissue along the " "edges.  Drainage:  Moderate amount of serosanguineous, nonodorous drainage  Periwound Tissue:  No surrounding erythema, warmth or maceration.  Treatment:  Silver nitrate stick was used to treat the hyper granulation tissue and well tolerated by patient with very little discomfort.  Wound irrigated with saline wash prior to silver nitrate application, collagen placed over wound bed.  Calcium alginate placed in the wound followed by 5 x 7 cm Medipore island dressing.    Assessment:     ICD-10-CM    1. Chronic ulcer of sacral region with fat layer exposed (H) L98.492 Gauze Pads & Dressings (T[place] DRESSING) 2\"X3-3/4\" PADS   2. Menorrhagia with irregular cycle N92.1 medroxyPROGESTERone (DEPO-PROVERA) 150 MG/ML injection       Plan:   Continue with daily by cleansing with saline wash then placing collagen into wound bed and packing with calcium alginate followed by island dressing.  Continue to pack well to prevent hypergranulation tissue.  Follow-up in 2 weeks.  Follow-up sooner with increased pain, purulent drainage or surrounding redness or warmth fever or chills.    Depo-Provera injection provided.    KERMIT Presley   4/23/2018  8:12 AM        "

## 2018-04-23 NOTE — MR AVS SNAPSHOT
"              After Visit Summary   4/23/2018    Tia Vázquez    MRN: [id number]           Patient Information     Date Of Birth          1990        Visit Information        Provider Department      4/23/2018 7:40 AM Kiki Hu NP [facility]        Today's Diagnoses     Chronic ulcer of sacral region with fat layer exposed (H)    -  1    Menorrhagia with irregular cycle           Follow-ups after your visit        Your next 10 appointments already scheduled     Apr 24, 2018  4:15 PM CDT   Office Visit with Nicho Peoples MD   [facility] ([facility])    [address]  [place] [address] [phone]           Bring a current list of meds and any records pertaining to this visit. For Physicals, please bring immunization records and any forms needing to be filled out. Please arrive 10 minutes early to complete paperwork.              Who to contact     If you have questions or need follow up information about today's clinic visit or your schedule please contact [facility] directly at [phone].  Normal or non-critical lab and imaging results will be communicated to you by [employer], letter or phone within 4 business days after the clinic has received the results. If you do not hear from us within 7 days, please contact the clinic through [employer] or phone. If you have a critical or abnormal lab result, we will notify you by phone as soon as possible.  Submit refill requests through [employer] or call your pharmacy and they will forward the refill request to us. Please allow 3 business days for your refill to be completed.          Additional Information About Your Visit        [employer] Information     [employer] lets you send messages to your doctor, view your test results, renew your prescriptions, schedule appointments and more. To sign up, go to [email or web address] . Click on \"Log in\" on " "the left side of the screen, which will take you to the Welcome page. Then click on \"Sign up Now\" on the right side of the page.     You will be asked to enter the access code listed below, as well as some personal information. Please follow the directions to create your username and password.     Your access code is: [email or web address]  Expires: 2018 12:37 PM     Your access code will  in 90 days. If you need help or a new code, please call your [facility] or [phone].        Care EveryWhere ID     This is your Care EveryWhere ID. This could be used by other organizations to access your [place] medical records  [id number]        Your Vitals Were     Pulse Temperature BMI (Body Mass Index)             64 96.5  F (35.8  C) (Tympanic) 36.23 kg/m2          Blood Pressure from Last 3 Encounters:   18 130/86   18 128/88   18 128/72    Weight from Last 3 Encounters:   18 204 lb 8 oz (92.8 kg)   18 203 lb (92.1 kg)   18 204 lb 7 oz (92.7 kg)              Today, you had the following     No orders found for display         Today's Medication Changes          These changes are accurate as of 18  8:12 AM.  If you have any questions, ask your nurse or doctor.               Start taking these medicines.        Dose/Directions    T[place] DRESSING 2\"X3-3/4\" Pads   Used for:  Chronic ulcer of sacral region with fat layer exposed (H)   Started by:  Kiki Hu, NP        Dose:  1 Units   1 Units daily   Quantity:  30 each   Refills:  3            Where to get your medicines      These medications were sent to [facility] [address]  [address] [facility] [address]     Phone:  [phone]     T[place] DRESSING 2\"X3-3/4\" Pads         Some of these will need a paper prescription and others can be bought over the counter.  Ask your nurse if you have questions.     You don't need a prescription for " "these medications     medroxyPROGESTERone 150 MG/ML injection                Primary Care Provider Office Phone # Fax #    [facility] [phone] [address]  [facility] [address]        Equal Access to Services     ROHINI WADE : Hadii aad ku hadfinn Soalexandraali, waaxda luqadaha, qaybta kaalmada adehamilton, stephanie sofiain hayaagail lewis latashanga maki. So [facility] [phone].    ATENCIÓN: Si habla español, tiene a harris disposición servicios gratuitos de asistencia lingüística. Llame al [phone].    We comply with applicable federal civil rights laws and [place] laws. We do not discriminate on the basis of race, color, national origin, age, disability, sex, sexual orientation, or gender identity.            Thank you!     Thank you for choosing [facility] [place] [facility] AND [facility]  for your care. Our goal is always to provide you with excellent care. Hearing back from our patients is one way we can continue to improve our services. Please take a few minutes to complete the written survey that you may receive in the mail after your visit with us. Thank you!             Your Updated Medication List - Protect others around you: Learn how to safely use, store and throw away your medicines at www.disposemymeds.org.          This list is accurate as of 4/23/18  8:12 AM.  Always use your most recent med list.                   Brand Name Dispense Instructions for use Diagnosis    ALGISITE M 4\"X4\" Misc     3 each    Apply to wound with daily dressing change    Chronic ulcer of sacral region with fat layer exposed (H)       ALLEVYN GENTLE BORDER LITE Pads     30 each    Externally apply 1 Units topically daily Size of Dressing: 3   inch    Chronic ulcer of sacral region with fat layer exposed (H)       medroxyPROGESTERone 150 MG/ML injection    DEPO-PROVERA    0.9 mL    Inject 1 mL (150 mg) into the muscle every 3 months    Menorrhagia with irregular cycle       * methylphenidate 40 MG Cp24    " "RITALIN LA    30 capsule    Take 40 mg by mouth daily    Attention deficit hyperactivity disorder (ADHD), unspecified ADHD type, Controlled substance agreement signed       * methylphenidate 40 MG Cp24    RITALIN LA    30 capsule    Take 40 mg by mouth daily    Attention deficit hyperactivity disorder (ADHD), unspecified ADHD type, Controlled substance agreement signed       naproxen 500 MG tablet    NAPROSYN     Take 500 mg by mouth 2 times daily (with meals)        sodium chloride 0.9 % Soln     1 each    Cleanse wound with dressing changes    Chronic ulcer of sacral region with fat layer exposed (H)       T[place] DRESSING 2\"X3-3/4\" Pads     30 each    1 Units daily    Chronic ulcer of sacral region with fat layer exposed (H)       triamcinolone 0.1 % cream    KENALOG     Apply  topically to affected area(s) 3 times daily.        * Notice:  This list has 2 medication(s) that are the same as other medications prescribed for you. Read the directions carefully, and ask your doctor or other care provider to review them with you.      "

## 2018-04-23 NOTE — NURSING NOTE
Patient presents to the clinic for wound care of the sacral region and Depo Shot.      Candida Tavarez LPN 4/23/2018 7:51 AM

## 2018-04-24 ENCOUNTER — OFFICE VISIT (OUTPATIENT)
Dept: FAMILY MEDICINE | Facility: OTHER | Age: 28
End: 2018-04-24
Attending: FAMILY MEDICINE
Payer: COMMERCIAL

## 2018-04-24 VITALS
SYSTOLIC BLOOD PRESSURE: 132 MMHG | WEIGHT: 206 LBS | HEART RATE: 84 BPM | BODY MASS INDEX: 36.49 KG/M2 | DIASTOLIC BLOOD PRESSURE: 88 MMHG

## 2018-04-24 DIAGNOSIS — F90.9 ATTENTION DEFICIT HYPERACTIVITY DISORDER (ADHD), UNSPECIFIED ADHD TYPE: Primary | ICD-10-CM

## 2018-04-24 DIAGNOSIS — Z79.899 CONTROLLED SUBSTANCE AGREEMENT SIGNED: ICD-10-CM

## 2018-04-24 PROCEDURE — 80307 DRUG TEST PRSMV CHEM ANLYZR: CPT | Performed by: FAMILY MEDICINE

## 2018-04-24 PROCEDURE — 99213 OFFICE O/P EST LOW 20 MIN: CPT | Performed by: FAMILY MEDICINE

## 2018-04-24 RX ORDER — METHYLPHENIDATE HYDROCHLORIDE 40 MG/1
40 CAPSULE, EXTENDED RELEASE ORAL DAILY
Qty: 30 CAPSULE | Refills: 0 | Status: SHIPPED | OUTPATIENT
Start: 2018-04-24 | End: 2018-04-24

## 2018-04-24 RX ORDER — METHYLPHENIDATE HYDROCHLORIDE 40 MG/1
40 CAPSULE, EXTENDED RELEASE ORAL DAILY
Qty: 30 CAPSULE | Refills: 0 | Status: SHIPPED | OUTPATIENT
Start: 2018-04-24 | End: 2018-07-09

## 2018-04-24 ASSESSMENT — PATIENT HEALTH QUESTIONNAIRE - PHQ9: SUM OF ALL RESPONSES TO PHQ QUESTIONS 1-9: 0

## 2018-04-24 ASSESSMENT — ANXIETY QUESTIONNAIRES: GAD7 TOTAL SCORE: 0

## 2018-04-24 ASSESSMENT — PAIN SCALES - GENERAL: PAINLEVEL: MILD PAIN (3)

## 2018-04-24 NOTE — NURSING NOTE
She is here today for medication refills.  Leanne Recinos LPN..................4/24/2018   4:22 PM

## 2018-04-24 NOTE — MR AVS SNAPSHOT
"              After Visit Summary   4/24/2018    Tia Vázquez    MRN: [id number]           Patient Information     Date Of Birth          1990        Visit Information        Provider Department      4/24/2018 4:15 PM Nicho Peoples MD [facility]        Today's Diagnoses     Attention deficit hyperactivity disorder (ADHD), unspecified ADHD type    -  1    Controlled substance agreement signed           Follow-ups after your visit        Your next 10 appointments already scheduled     May 07, 2018  7:40 AM CDT   [facility] with Kiki Hu NP   [facility]    [address]  [place] [address] [phone]              Who to contact     If you have questions or need follow up information about today's clinic visit or your schedule please contact [facility] directly at [phone].  Normal or non-critical lab and imaging results will be communicated to you by [employer], letter or phone within 4 business days after the clinic has received the results. If you do not hear from us within 7 days, please contact the clinic through [employer] or phone. If you have a critical or abnormal lab result, we will notify you by phone as soon as possible.  Submit refill requests through [employer] or call your pharmacy and they will forward the refill request to us. Please allow 3 business days for your refill to be completed.          Additional Information About Your Visit        MyChart Information     [employer] lets you send messages to your doctor, view your test results, renew your prescriptions, schedule appointments and more. To sign up, go to [email or web address] . Click on \"Log in\" on the left side of the screen, which will take you to the Welcome page. Then click on \"Sign up Now\" on the right side of the page.     You will be asked to enter the access code listed below, as well as some " personal information. Please follow the directions to create your username and password.     Your access code is: [email or web address]  Expires: 2018 12:37 PM     Your access code will  in 90 days. If you need help or a new code, please call your [place] clinic or [phone].        Care EveryWhere ID     This is your Care EveryWhere ID. This could be used by other organizations to access your [place] medical records  [id number]        Your Vitals Were     Pulse Breastfeeding? BMI (Body Mass Index)             84 No 36.49 kg/m2          Blood Pressure from Last 3 Encounters:   18 132/88   18 130/86   18 128/88    Weight from Last 3 Encounters:   18 206 lb (93.4 kg)   18 204 lb 8 oz (92.8 kg)   18 203 lb (92.1 kg)              We Performed the Following     Pain Drug Scr UR W Rptd Meds          Today's Medication Changes          These changes are accurate as of 18  4:59 PM.  If you have any questions, ask your nurse or doctor.               These medicines have changed or have updated prescriptions.        Dose/Directions    * methylphenidate 40 MG Cp24   Commonly known as:  RITALIN LA   This may have changed:  Another medication with the same name was added. Make sure you understand how and when to take each.   Used for:  Attention deficit hyperactivity disorder (ADHD), unspecified ADHD type, Controlled substance agreement signed   Changed by:  Nicho Peoples MD        Dose:  40 mg   Take 40 mg by mouth daily   Quantity:  30 capsule   Refills:  0       * methylphenidate 40 MG Cp24   Commonly known as:  RITALIN LA   This may have changed:  You were already taking a medication with the same name, and this prescription was added. Make sure you understand how and when to take each.   Used for:  Attention deficit hyperactivity disorder (ADHD), unspecified ADHD type, Controlled substance agreement signed   Changed by:  Nicho Peoples MD        Dose:  40 mg   Take 40  "mg by mouth daily   Quantity:  30 capsule   Refills:  0       * Notice:  This list has 2 medication(s) that are the same as other medications prescribed for you. Read the directions carefully, and ask your doctor or other care provider to review them with you.         Where to get your medicines      Some of these will need a paper prescription and others can be bought over the counter.  Ask your nurse if you have questions.     Bring a paper prescription for each of these medications     methylphenidate 40 MG Cp24                Primary Care Provider Office Phone # Fax #    [facility] [phone] [address] [facility] [address]        Equal Access to Services     [facility] : Hadii ana Daley, amor smith, aminah key, stephanie quach . So [facility] [phone].    ATENCIÓN: Si habla español, tiene a harris disposición servicios gratuitos de asistencia lingüística. Llame al [phone].    We comply with applicable federal civil rights laws and [place] laws. We do not discriminate on the basis of race, color, national origin, age, disability, sex, sexual orientation, or gender identity.            Thank you!     Thank you for choosing [facility] AND [facility]  for your care. Our goal is always to provide you with excellent care. Hearing back from our patients is one way we can continue to improve our services. Please take a few minutes to complete the written survey that you may receive in the mail after your visit with us. Thank you!             Your Updated Medication List - Protect others around you: Learn how to safely use, store and throw away your medicines at www.disposemymeds.org.          This list is accurate as of 4/24/18  4:59 PM.  Always use your most recent med list.                   Brand Name Dispense Instructions for use Diagnosis    ALGISITE M 4\"X4\" Misc     3 each    Apply to wound with daily " "dressing change    Chronic ulcer of sacral region with fat layer exposed (H)       ALLEVYN GENTLE BORDER LITE Pads     30 each    Externally apply 1 Units topically daily Size of Dressing: 3   inch    Chronic ulcer of sacral region with fat layer exposed (H)       medroxyPROGESTERone 150 MG/ML injection    DEPO-PROVERA    1 mL    Inject 1 mL (150 mg) into the muscle every 3 months    Menorrhagia with irregular cycle       * methylphenidate 40 MG Cp24    RITALIN LA    30 capsule    Take 40 mg by mouth daily    Attention deficit hyperactivity disorder (ADHD), unspecified ADHD type, Controlled substance agreement signed       * methylphenidate 40 MG Cp24    RITALIN LA    30 capsule    Take 40 mg by mouth daily    Attention deficit hyperactivity disorder (ADHD), unspecified ADHD type, Controlled substance agreement signed       naproxen 500 MG tablet    NAPROSYN     Take 500 mg by mouth 2 times daily (with meals)        sodium chloride 0.9 % Soln     1 each    Cleanse wound with dressing changes    Chronic ulcer of sacral region with fat layer exposed (H)       T[place] DRESSING 2\"X3-3/4\" Pads     30 each    1 Units daily    Chronic ulcer of sacral region with fat layer exposed (H)       triamcinolone 0.1 % cream    KENALOG     Apply  topically to affected area(s) 3 times daily.        * Notice:  This list has 2 medication(s) that are the same as other medications prescribed for you. Read the directions carefully, and ask your doctor or other care provider to review them with you.      "

## 2018-04-25 ASSESSMENT — PATIENT HEALTH QUESTIONNAIRE - PHQ9: SUM OF ALL RESPONSES TO PHQ QUESTIONS 1-9: 0

## 2018-05-03 LAB — PAIN DRUG SCR UR W RPTD MEDS: NORMAL

## 2018-05-07 ENCOUNTER — OFFICE VISIT (OUTPATIENT)
Dept: INTERNAL MEDICINE | Facility: OTHER | Age: 28
End: 2018-05-07
Attending: NURSE PRACTITIONER
Payer: COMMERCIAL

## 2018-05-07 VITALS
SYSTOLIC BLOOD PRESSURE: 128 MMHG | BODY MASS INDEX: 37.03 KG/M2 | DIASTOLIC BLOOD PRESSURE: 84 MMHG | WEIGHT: 209 LBS | TEMPERATURE: 97.1 F | HEIGHT: 63 IN

## 2018-05-07 DIAGNOSIS — L98.492: Primary | ICD-10-CM

## 2018-05-07 DIAGNOSIS — L30.4 INTERTRIGO: ICD-10-CM

## 2018-05-07 PROCEDURE — 99214 OFFICE O/P EST MOD 30 MIN: CPT | Performed by: NURSE PRACTITIONER

## 2018-05-07 RX ORDER — NYSTATIN 100000 U/G
CREAM TOPICAL DAILY
Qty: 15 G | Refills: 0 | Status: SHIPPED | OUTPATIENT
Start: 2018-05-07 | End: 2019-02-05

## 2018-05-07 ASSESSMENT — PAIN SCALES - GENERAL: PAINLEVEL: NO PAIN (0)

## 2018-05-07 NOTE — NURSING NOTE
Patient is here for a follow up on wound care   Selma Al LPN...................5/7/2018   7:40 AM

## 2018-05-07 NOTE — MR AVS SNAPSHOT
"              After Visit Summary   5/7/2018    Tia Vázquez    MRN: [id number]           Patient Information     Date Of Birth          1990        Visit Information        Provider Department      5/7/2018 7:40 AM Kiki Hu NP [facility]        Today's Diagnoses     Chronic ulcer of sacral region with fat layer exposed (H)    -  1    Intertrigo           Follow-ups after your visit        Your next 10 appointments already scheduled     May 16, 2018  7:40 AM CDT   SHORT with Kiki Hu NP   [facility] ([facility])    [address]  [place] [address] [phone]              Who to contact     If you have questions or need follow up information about today's clinic visit or your schedule please contact [facility] directly at [phone].  Normal or non-critical lab and imaging results will be communicated to you by [employer], letter or phone within 4 business days after the clinic has received the results. If you do not hear from us within 7 days, please contact the clinic through [employer] or phone. If you have a critical or abnormal lab result, we will notify you by phone as soon as possible.  Submit refill requests through [employer] or call your pharmacy and they will forward the refill request to us. Please allow 3 business days for your refill to be completed.          Additional Information About Your Visit        [employer] Information     [employer] lets you send messages to your doctor, view your test results, renew your prescriptions, schedule appointments and more. To sign up, go to [email or web address] . Click on \"Log in\" on the left side of the screen, which will take you to the Welcome page. Then click on \"Sign up Now\" on the right side of the page.     You will be asked to enter the access code listed below, as well as some personal information. Please follow the " "directions to create your username and password.     Your access code is: [email or web address]  Expires: 2018 12:37 PM     Your access code will  in 90 days. If you need help or a new code, please call your [facility] or [phone].        Care EveryWhere ID     This is your Care EveryWhere ID. This could be used by other organizations to access your [place] medical records  [id number]        Your Vitals Were     Temperature Height Breastfeeding? BMI (Body Mass Index)          97.1  F (36.2  C) (Tympanic) 5' 3\" (1.6 m) No 37.02 kg/m2         Blood Pressure from Last 3 Encounters:   18 128/84   18 132/88   18 130/86    Weight from Last 3 Encounters:   18 209 lb (94.8 kg)   18 206 lb (93.4 kg)   18 204 lb 8 oz (92.8 kg)              Today, you had the following     No orders found for display         Today's Medication Changes          These changes are accurate as of 18  8:01 AM.  If you have any questions, ask your nurse or doctor.               Start taking these medicines.        Dose/Directions    nystatin cream   Commonly known as:  MYCOSTATIN   Used for:  Intertrigo   Started by:  Kiki Hu, DAISY        Apply topically daily for 10 days   Quantity:  15 g   Refills:  0            Where to get your medicines      These medications were sent to [facility] [address] [place], [place] - [address]  [address], [facility] [address]     Phone:  [phone]     nystatin cream                Primary Care Provider Office Phone # Fax #    [facility] [phone]       [address]  [facility] [address]        Equal Access to Services     [facility] MAURO AH: Rhonda Daley, wamaryda luqadaha, qaybta kaalmada yesi, stephanie maki. So [facility] [phone].    ATENCIÓN: Si habla español, tiene a harris disposición servicios gratuitos de asistencia " "bettyJaun Bullard al [phone].    We comply with applicable federal civil rights laws and [place] laws. We do not discriminate on the basis of race, color, national origin, age, disability, sex, sexual orientation, or gender identity.            Thank you!     Thank you for choosing [facility] AND [facility]  for your care. Our goal is always to provide you with excellent care. Hearing back from our patients is one way we can continue to improve our services. Please take a few minutes to complete the written survey that you may receive in the mail after your visit with us. Thank you!             Your Updated Medication List - Protect others around you: Learn how to safely use, store and throw away your medicines at www.disposemymeds.org.          This list is accurate as of 5/7/18  8:01 AM.  Always use your most recent med list.                   Brand Name Dispense Instructions for use Diagnosis    ALGISITE M 4\"X4\" Misc     3 each    Apply to wound with daily dressing change    Chronic ulcer of sacral region with fat layer exposed (H)       ALLEVYN GENTLE BORDER LITE Pads     30 each    Externally apply 1 Units topically daily Size of Dressing: 3   inch    Chronic ulcer of sacral region with fat layer exposed (H)       medroxyPROGESTERone 150 MG/ML injection    DEPO-PROVERA    1 mL    Inject 1 mL (150 mg) into the muscle every 3 months    Menorrhagia with irregular cycle       * methylphenidate 40 MG Cp24    RITALIN LA    30 capsule    Take 40 mg by mouth daily    Attention deficit hyperactivity disorder (ADHD), unspecified ADHD type, Controlled substance agreement signed       * methylphenidate 40 MG Cp24    RITALIN LA    30 capsule    Take 40 mg by mouth daily    Attention deficit hyperactivity disorder (ADHD), unspecified ADHD type, Controlled substance agreement signed       naproxen 500 MG tablet    NAPROSYN     Take 500 mg by mouth 2 times daily (with meals)        nystatin cream    MYCOSTATIN " "   15 g    Apply topically daily for 10 days    Intertrigo       sodium chloride 0.9 % Soln     1 each    Cleanse wound with dressing changes    Chronic ulcer of sacral region with fat layer exposed (H)       T[place] DRESSING 2\"X3-3/4\" Pads     30 each    1 Units daily    Chronic ulcer of sacral region with fat layer exposed (H)       triamcinolone 0.1 % cream    KENALOG     Apply  topically to affected area(s) 3 times daily.        * Notice:  This list has 2 medication(s) that are the same as other medications prescribed for you. Read the directions carefully, and ask your doctor or other care provider to review them with you.      "

## 2018-05-07 NOTE — PROGRESS NOTES
"CLINIC VISIT WOUND CARE    Subjective:  This is a followup of a 27 year old female patient with a ulcer at the sacral region.  She has been doing dressing change daily.  She feels that the wound is getting better.  She does have a little soreness above the wound.  At this area she complains of burning and itching.  Denies fever and chills.    Past Medical History:   Diagnosis Date     Helicobacter pylori infection     No Comments Provided     Past Surgical History:   Procedure Laterality Date     exision mass retrorectal and coccyx  06/09/2017    Ori Espinoza MD at [facility]     Other  [no clinical screening - see comments]; Cvs cleansing skin; Menthol; and Pine tar  Current Outpatient Prescriptions   Medication     Calcium Alginate (ALGISITE M 4\"X4\") MISC     Gauze Pads & Dressings (T[place] DRESSING) 2\"X3-3/4\" PADS     medroxyPROGESTERone (DEPO-PROVERA) 150 MG/ML injection     methylphenidate (RITALIN LA) 40 MG CP24     methylphenidate (RITALIN LA) 40 MG CP24     naproxen (NAPROSYN) 500 MG tablet     nystatin (MYCOSTATIN) cream     sodium chloride 0.9 % SOLN     triamcinolone (KENALOG) 0.1 % cream     Wound Dressings (ALLEVYN GENTLE BORDER LITE) PADS     No current facility-administered medications for this visit.        Review of Systems:  Review of Systems  See HPI    Objective:   /84 (BP Location: Right arm, Patient Position: Chair, Cuff Size: Adult Large)  Temp 97.1  F (36.2  C) (Tympanic)  Ht 5' 3\" (1.6 m)  Wt 209 lb (94.8 kg)  Breastfeeding? No  BMI 37.02 kg/m2  Physical Exam   Pleasant young female, no acute distress.  Affect normal.  Alert and oriented ×4.  Proximal to the wound is a maculopapular rash that is slightly moist consistent with intertrigo    Wound Type:  Postsurgical wound  Location:  Sacrum, gluteal crease  Wound Measurements:  0.4 x 0.2 x 0.1 cm  Description:  Granulating wound bed with   Drainage:  Small to moderate amount of serosanguineous, nonodorous " drainage  Periwound Tissue:  No surrounding erythema, warmth or maceration.  .  Treatment:   Wound irrigated with saline wash prior to silver nitrate application, collagen placed over wound bed.  Calcium alginate placed in the wound followed by  Medipore island dressing.    Assessment:     ICD-10-CM    1. Chronic ulcer of sacral region with fat layer exposed (H) L98.492    2. Intertrigo L30.4 nystatin (MYCOSTATIN) cream       Plan:   1. Continue with daily wound care.  Continue to pack well to prevent hypergranulation tissue.  Follow-up in 10 days.  Follow-up sooner with increased pain, purulent drainage or surrounding redness or warmth fever or chills.  2. Apply nystatin once daily to intertrigo rash for 10 days.  Follow-up rash also in 10 days, sooner if getting worse    KERMIT Presley   5/7/2018  8:00 AM

## 2018-05-08 ASSESSMENT — PATIENT HEALTH QUESTIONNAIRE - PHQ9: SUM OF ALL RESPONSES TO PHQ QUESTIONS 1-9: 0

## 2018-05-16 ENCOUNTER — OFFICE VISIT (OUTPATIENT)
Dept: INTERNAL MEDICINE | Facility: OTHER | Age: 28
End: 2018-05-16
Attending: NURSE PRACTITIONER
Payer: COMMERCIAL

## 2018-05-16 VITALS
SYSTOLIC BLOOD PRESSURE: 124 MMHG | BODY MASS INDEX: 36.6 KG/M2 | WEIGHT: 206.6 LBS | TEMPERATURE: 96.8 F | DIASTOLIC BLOOD PRESSURE: 82 MMHG

## 2018-05-16 DIAGNOSIS — L30.4 INTERTRIGO: ICD-10-CM

## 2018-05-16 DIAGNOSIS — L98.492: Primary | ICD-10-CM

## 2018-05-16 PROCEDURE — 99213 OFFICE O/P EST LOW 20 MIN: CPT | Performed by: NURSE PRACTITIONER

## 2018-05-16 ASSESSMENT — PAIN SCALES - GENERAL: PAINLEVEL: NO PAIN (0)

## 2018-05-16 NOTE — PROGRESS NOTES
"CLINIC VISIT WOUND CARE    Subjective:  This is a followup of a 27 year old female patient with a ulcer at the sacral region.  She has been doing dressing change daily.  She feels that the wound is closed.  She denies pain and drainage.  She also is treating candidal rash around the area with nystatin cream.  That too has resolved.    Past Medical History:   Diagnosis Date     Helicobacter pylori infection     No Comments Provided     Past Surgical History:   Procedure Laterality Date     exision mass retrorectal and coccyx  06/09/2017    Ori Espinoza MD at [facility]     Other  [no clinical screening - see comments]; Cvs cleansing skin; Menthol; and Pine tar  Current Outpatient Prescriptions   Medication     Calcium Alginate (ALGISITE M 4\"X4\") MISC     Gauze Pads & Dressings (T[place] DRESSING) 2\"X3-3/4\" PADS     medroxyPROGESTERone (DEPO-PROVERA) 150 MG/ML injection     methylphenidate (RITALIN LA) 40 MG CP24     methylphenidate (RITALIN LA) 40 MG CP24     naproxen (NAPROSYN) 500 MG tablet     nystatin (MYCOSTATIN) cream     sodium chloride 0.9 % SOLN     triamcinolone (KENALOG) 0.1 % cream     Wound Dressings (ALLEVYN GENTLE BORDER LITE) PADS     No current facility-administered medications for this visit.        Review of Systems:  Review of Systems  See HPI    Objective:   /82 (BP Location: Right arm, Patient Position: Chair, Cuff Size: Adult Large)  Temp 96.8  F (36  C) (Tympanic)  Wt 206 lb 9.6 oz (93.7 kg)  Breastfeeding? No  BMI 36.6 kg/m2  Physical Exam   Pleasant young female, no acute distress.  Affect normal.  Alert and oriented ×4.  Proximal to the wound the maculopapular rash has resolved  Wound Type:  Postsurgical wound  Location:  Sacrum, gluteal crease  Wound Measurements:  0.4 cm in length partial opening with no depth  Description:  Granulating wound bed   Drainage:  No drainage  Periwound Tissue:  No surrounding erythema, warmth or maceration.   Treatment:  Areas irrigated " with saline wash, collagen packed into partial opening followed by calcium alginate and island dressing.    Assessment:     ICD-10-CM    1. Chronic ulcer of sacral region with fat layer exposed (H) L98.492    2. Intertrigo L30.4        Plan:   1. Continue with daily wound care for the next week.  She has no pain or drainage and area basically is closed completely.  If after treatment is discontinued she has pain, drainage or other problems that she will restart treatment plan and if not resolved in 2 weeks and will follow-up in clinic.  2.  Intertrigo rash has resolved.  Restart medication if this returns otherwise follow-up with concerns.    KERMIT Presley   5/16/2018  8:01 AM

## 2018-05-16 NOTE — MR AVS SNAPSHOT
"              After Visit Summary   2018    Tia Vázquez    MRN: [id number]           Patient Information     Date Of Birth          1990        Visit Information        Provider Department      2018 7:40 AM Kiki Hu NP [facility]        Today's Diagnoses     Chronic ulcer of sacral region with fat layer exposed (H)    -  1    Intertrigo           Follow-ups after your visit        Who to contact     If you have questions or need follow up information about today's clinic visit or your schedule please contact [facility] directly at [phone].  Normal or non-critical lab and imaging results will be communicated to you by [employer], letter or phone within 4 business days after the clinic has received the results. If you do not hear from us within 7 days, please contact the clinic through [employer] or phone. If you have a critical or abnormal lab result, we will notify you by phone as soon as possible.  Submit refill requests through [employer] or call your pharmacy and they will forward the refill request to us. Please allow 3 business days for your refill to be completed.          Additional Information About Your Visit        MyChart Information     [employer] lets you send messages to your doctor, view your test results, renew your prescriptions, schedule appointments and more. To sign up, go to [email or web address] . Click on \"Log in\" on the left side of the screen, which will take you to the Welcome page. Then click on \"Sign up Now\" on the right side of the page.     You will be asked to enter the access code listed below, as well as some personal information. Please follow the directions to create your username and password.     Your access code is: [email or web address]  Expires: 2018 12:37 PM     Your access code will  in 90 days. If you need help or a new code, please call your [place] clinic or [phone].        Care " EveryWhere ID     This is your Care EveryWhere ID. This could be used by other organizations to access your [place] medical records  [id number]        Your Vitals Were     Temperature Breastfeeding? BMI (Body Mass Index)             96.8  F (36  C) (Tympanic) No 36.6 kg/m2          Blood Pressure from Last 3 Encounters:   05/16/18 124/82   05/07/18 128/84   04/24/18 132/88    Weight from Last 3 Encounters:   05/16/18 206 lb 9.6 oz (93.7 kg)   05/07/18 209 lb (94.8 kg)   04/24/18 206 lb (93.4 kg)              Today, you had the following     No orders found for display       Primary Care Provider Office Phone # Fax #    [facility] [phone] [address] [facility] [address]        Equal Access to Services     ROHINI WADE : Rhonda Daley, waminh smith, aminah greenealgurpreet key, stephanie quach . So [facility] [phone].    ATENCIÓN: Si habla español, tiene a harris disposición servicios gratuitos de asistencia lingüística. Juan Miguel al [phone].    We comply with applicable federal civil rights laws and [place] laws. We do not discriminate on the basis of race, color, national origin, age, disability, sex, sexual orientation, or gender identity.            Thank you!     Thank you for choosing [facility] [place]  for your care. Our goal is always to provide you with excellent care. Hearing back from our patients is one way we can continue to improve our services. Please take a few minutes to complete the written survey that you may receive in the mail after your visit with us. Thank you!             Your Updated Medication List - Protect others around you: Learn how to safely use, store and throw away your medicines at www.disposemymeds.org.          This list is accurate as of 5/16/18  8:01 AM.  Always use your most recent med list.                   Brand Name Dispense Instructions for use Diagnosis    NITIN M  "4\"X4\" Misc     3 each    Apply to wound with daily dressing change    Chronic ulcer of sacral region with fat layer exposed (H)       ALLEVYN GENTLE BORDER LITE Pads     30 each    Externally apply 1 Units topically daily Size of Dressing: 3   inch    Chronic ulcer of sacral region with fat layer exposed (H)       medroxyPROGESTERone 150 MG/ML injection    DEPO-PROVERA    1 mL    Inject 1 mL (150 mg) into the muscle every 3 months    Menorrhagia with irregular cycle       * methylphenidate 40 MG Cp24    RITALIN LA    30 capsule    Take 40 mg by mouth daily    Attention deficit hyperactivity disorder (ADHD), unspecified ADHD type, Controlled substance agreement signed       * methylphenidate 40 MG Cp24    RITALIN LA    30 capsule    Take 40 mg by mouth daily    Attention deficit hyperactivity disorder (ADHD), unspecified ADHD type, Controlled substance agreement signed       naproxen 500 MG tablet    NAPROSYN     Take 500 mg by mouth 2 times daily (with meals)        nystatin cream    MYCOSTATIN    15 g    Apply topically daily for 10 days    Intertrigo       sodium chloride 0.9 % Soln     1 each    Cleanse wound with dressing changes    Chronic ulcer of sacral region with fat layer exposed (H)       T[place] DRESSING 2\"X3-3/4\" Pads     30 each    1 Units daily    Chronic ulcer of sacral region with fat layer exposed (H)       triamcinolone 0.1 % cream    KENALOG     Apply  topically to affected area(s) 3 times daily.        * Notice:  This list has 2 medication(s) that are the same as other medications prescribed for you. Read the directions carefully, and ask your doctor or other care provider to review them with you.      "

## 2018-05-17 ASSESSMENT — PATIENT HEALTH QUESTIONNAIRE - PHQ9: SUM OF ALL RESPONSES TO PHQ QUESTIONS 1-9: 0

## 2018-07-02 ENCOUNTER — TELEPHONE (OUTPATIENT)
Dept: FAMILY MEDICINE | Facility: OTHER | Age: 28
End: 2018-07-02

## 2018-07-02 NOTE — TELEPHONE ENCOUNTER
The patient was given an appointment on July 9 th with Nicho Peoples MD.  Leanne Recinos LPN..................7/2/2018   5:41 PM

## 2018-07-09 ENCOUNTER — OFFICE VISIT (OUTPATIENT)
Dept: FAMILY MEDICINE | Facility: OTHER | Age: 28
End: 2018-07-09
Attending: FAMILY MEDICINE
Payer: COMMERCIAL

## 2018-07-09 VITALS
HEART RATE: 112 BPM | BODY MASS INDEX: 36.85 KG/M2 | WEIGHT: 208 LBS | DIASTOLIC BLOOD PRESSURE: 86 MMHG | SYSTOLIC BLOOD PRESSURE: 122 MMHG

## 2018-07-09 DIAGNOSIS — F90.9 ATTENTION DEFICIT HYPERACTIVITY DISORDER (ADHD), UNSPECIFIED ADHD TYPE: ICD-10-CM

## 2018-07-09 DIAGNOSIS — Z79.899 CONTROLLED SUBSTANCE AGREEMENT SIGNED: ICD-10-CM

## 2018-07-09 DIAGNOSIS — N92.1 MENORRHAGIA WITH IRREGULAR CYCLE: Primary | ICD-10-CM

## 2018-07-09 PROCEDURE — 99213 OFFICE O/P EST LOW 20 MIN: CPT | Performed by: FAMILY MEDICINE

## 2018-07-09 PROCEDURE — 96372 THER/PROPH/DIAG INJ SC/IM: CPT

## 2018-07-09 PROCEDURE — 25000128 H RX IP 250 OP 636: Performed by: FAMILY MEDICINE

## 2018-07-09 RX ORDER — METHYLPHENIDATE HYDROCHLORIDE 40 MG/1
40 CAPSULE, EXTENDED RELEASE ORAL DAILY
Qty: 30 CAPSULE | Refills: 0 | Status: SHIPPED | OUTPATIENT
Start: 2018-07-09 | End: 2018-07-09

## 2018-07-09 RX ORDER — MEDROXYPROGESTERONE ACETATE 150 MG/ML
150 INJECTION, SUSPENSION INTRAMUSCULAR
Status: DISCONTINUED | OUTPATIENT
Start: 2018-07-09 | End: 2020-07-09

## 2018-07-09 RX ORDER — METHYLPHENIDATE HYDROCHLORIDE 40 MG/1
40 CAPSULE, EXTENDED RELEASE ORAL DAILY
Qty: 30 CAPSULE | Refills: 0 | Status: SHIPPED | OUTPATIENT
Start: 2018-07-09 | End: 2019-11-26

## 2018-07-09 RX ADMIN — MEDROXYPROGESTERONE ACETATE 150 MG: 150 INJECTION, SUSPENSION INTRAMUSCULAR at 12:41

## 2018-07-09 ASSESSMENT — PAIN SCALES - GENERAL: PAINLEVEL: NO PAIN (0)

## 2018-07-09 NOTE — PROGRESS NOTES
"Nursing Notes:   Samantha Caraballo LPN  7/9/2018 12:02 PM  Signed  Patient is here for medication check for refills and depo shot.  Samantha Caraballo LPN .............7/9/2018     11:34 AM      SUBJECTIVE:  Tia Vázquez  is a 27 year old female who comes in today for follow-up and medication renewal.  I last saw her in April and continues on Ritalin LA 40 mg daily.  Last Tox[employer]ure was in April.  She is due for Depo-Provera.    She got  this last weekend.     Past Medical, Family, and Social History reviewed and updated as noted below.   ROS is negative except as noted above       Allergies   Allergen Reactions     Other  [No Clinical Screening - See Comments]      Other reaction(s): Throat Irritation  Carmex, Pine, Icy Hot     [facility] Cleansing Skin      Other reaction(s): Swelling     Menthol      Other reaction(s): Swelling     Pine Tar      Other reaction(s): Runny Nose   ,   Family History   Problem Relation Age of Onset     Cancer Father      Cancer, h/o testicular CA     HEART DISEASE Maternal Grandmother      Heart Disease,cardiomyopathy     Blood Disease Maternal Grandmother      Blood Disease,blood dyscrasia   ,   Current Outpatient Prescriptions   Medication     Calcium Alginate (ALGISITE M 4\"X4\") MISC     Gauze Pads & Dressings (T[place] DRESSING) 2\"X3-3/4\" PADS     medroxyPROGESTERone (DEPO-PROVERA) 150 MG/ML injection     methylphenidate (RITALIN LA) 40 MG CP24     methylphenidate (RITALIN LA) 40 MG CP24     naproxen (NAPROSYN) 500 MG tablet     sodium chloride 0.9 % SOLN     triamcinolone (KENALOG) 0.1 % cream     Wound Dressings (ALLEVYN GENTLE BORDER LITE) PADS     Current Facility-Administered Medications   Medication     medroxyPROGESTERone (DEPO-PROVERA) injection 150 mg   ,   Past Medical History:   Diagnosis Date     Helicobacter pylori infection     No Comments Provided   ,   Patient Active Problem List    Diagnosis Date Noted     Controlled substance agreement signed 02/20/2018     " Priority: Medium     Allergic rhinitis due to pollen 01/30/2018     Priority: Medium     Overview:   Spring and fall       Contact dermatitis and eczema 01/30/2018     Priority: Medium     Overview:   Left ear       Menorrhagia with irregular cycle 01/30/2018     Priority: Medium     Chronic ulcer of sacral region with fat layer exposed (H) 09/26/2017     Priority: Medium     Other granulomatous disorders of the skin and subcutaneous tissue 09/26/2017     Priority: Medium     Mass of perirectal soft tissue 04/12/2017     Priority: Medium     Screening for malignant neoplasm of cervix 03/17/2016     Priority: Medium     Overview:   2016 NILM  PLAN: PAP TEST 3/2019  ; PAP TEST HISTORY       ADHD 09/24/2010     Priority: Medium     Overview:   Evaluation from [facility] done.     ,   Past Surgical History:   Procedure Laterality Date     exision mass retrorectal and coccyx  06/09/2017    Ori Espinoza MD at [facility]    and   Social History   Substance Use Topics     Smoking status: Never Smoker     Smokeless tobacco: Never Used     Alcohol use 2.5 oz/week     OBJECTIVE:  /86  Pulse 112  Wt 208 lb (94.3 kg)  LMP 07/02/2018  BMI 36.85 kg/m2   EXAM:  Alert cooperative, no distress.  Affect is broad ranging and appropriate.  ASSESSMENT/Plan :    Tia was seen today for recheck medication.    Diagnoses and all orders for this visit:    Menorrhagia with irregular cycle    Attention deficit hyperactivity disorder (ADHD), unspecified ADHD type  -     Discontinue: methylphenidate (RITALIN LA) 40 MG CP24; Take 40 mg by mouth daily  -     Discontinue: methylphenidate (RITALIN LA) 40 MG CP24; Take 40 mg by mouth daily  -     methylphenidate (RITALIN LA) 40 MG CP24; Take 40 mg by mouth daily    Controlled substance agreement signed  -     Discontinue: methylphenidate (RITALIN LA) 40 MG CP24; Take 40 mg by mouth daily  -     Discontinue: methylphenidate (RITALIN LA) 40 MG CP24; Take 40 mg by mouth  daily  -     methylphenidate (RITALIN LA) 40 MG CP24; Take 40 mg by mouth daily    Other orders  -     medroxyPROGESTERone (DEPO-PROVERA) injection 150 mg; Inject 1 mL (150 mg) into the muscle every 3 months     query is appropriate.  Renewal on Ritalin LA 40 mg #30×3.  Depo-Provera today.  Follow-up in 3 months, sooner if needed    A total of 15 minutes was spent with the patient, greater than 50% of the time was spent in counseling/discussion of the aforementioned concerns.     Nicho Peoples MD

## 2018-07-09 NOTE — NURSING NOTE
Patient is here for medication check for refills and depo shot.  Samantha Caraballo LPN .............7/9/2018     11:34 AM

## 2018-07-09 NOTE — MR AVS SNAPSHOT
After Visit Summary   7/9/2018    Tia Vázquez    MRN: [id number]           Patient Information     Date Of Birth          1990        Visit Information        Provider Department      7/9/2018 11:30 AM Nicho Peoples MD [facility]        Today's Diagnoses     Menorrhagia with irregular cycle    -  1    Attention deficit hyperactivity disorder (ADHD), unspecified ADHD type        Controlled substance agreement signed           Follow-ups after your visit        Who to contact     If you have questions or need follow up information about today's clinic visit or your schedule please contact [facility] AND [facility] directly at [phone].  Normal or non-critical lab and imaging results will be communicated to you by MyChart, letter or phone within 4 business days after the clinic has received the results. If you do not hear from us within 7 days, please contact the clinic through MyChart or phone. If you have a critical or abnormal lab result, we will notify you by phone as soon as possible.  Submit refill requests through [employer] or call your pharmacy and they will forward the refill request to us. Please allow 3 business days for your refill to be completed.          Additional Information About Your Visit        Care EveryWhere ID     This is your Care EveryWhere ID. This could be used by other organizations to access your [place] medical records  [id number]        Your Vitals Were     Pulse Last Period BMI (Body Mass Index)             112 07/02/2018 36.85 kg/m2          Blood Pressure from Last 3 Encounters:   07/09/18 122/86   05/16/18 124/82   05/07/18 128/84    Weight from Last 3 Encounters:   07/09/18 208 lb (94.3 kg)   05/16/18 206 lb 9.6 oz (93.7 kg)   05/07/18 209 lb (94.8 kg)              Today, you had the following     No orders found for display         Today's Medication Changes          These changes are accurate as of 7/9/18 12:50 PM.   If you have any questions, ask your nurse or doctor.               These medicines have changed or have updated prescriptions.        Dose/Directions    * methylphenidate 40 MG Cp24   Commonly known as:  RITALIN LA   This may have changed:  Another medication with the same name was added. Make sure you understand how and when to take each.   Used for:  Attention deficit hyperactivity disorder (ADHD), unspecified ADHD type, Controlled substance agreement signed   Changed by:  Nicho Peoples MD        Dose:  40 mg   Take 40 mg by mouth daily   Quantity:  30 capsule   Refills:  0       * methylphenidate 40 MG Cp24   Commonly known as:  RITALIN LA   This may have changed:  You were already taking a medication with the same name, and this prescription was added. Make sure you understand how and when to take each.   Used for:  Attention deficit hyperactivity disorder (ADHD), unspecified ADHD type, Controlled substance agreement signed   Changed by:  Nicho Peoples MD        Dose:  40 mg   Take 40 mg by mouth daily   Quantity:  30 capsule   Refills:  0       * Notice:  This list has 2 medication(s) that are the same as other medications prescribed for you. Read the directions carefully, and ask your doctor or other care provider to review them with you.         Where to get your medicines      Some of these will need a paper prescription and others can be bought over the counter.  Ask your nurse if you have questions.     Bring a paper prescription for each of these medications     methylphenidate 40 MG Cp24                Primary Care Provider Office Phone # Fax #    [facility] [phone] [address] [facility] [address]        Equal Access to Services     [facility]: Rhonda Daley, amor smith, stephanie barragan. So [facility] [phone].    ATENCIÓN: Si habla español, tiene a harris disposición servicios  "mayra de asistencia lingüística. Juan Miguel burroughs [phone].    We comply with applicable federal civil rights laws and [place] laws. We do not discriminate on the basis of race, color, national origin, age, disability, sex, sexual orientation, or gender identity.            Thank you!     Thank you for choosing [facility] AND [facility]  for your care. Our goal is always to provide you with excellent care. Hearing back from our patients is one way we can continue to improve our services. Please take a few minutes to complete the written survey that you may receive in the mail after your visit with us. Thank you!             Your Updated Medication List - Protect others around you: Learn how to safely use, store and throw away your medicines at www.disposemymeds.org.          This list is accurate as of 7/9/18 12:50 PM.  Always use your most recent med list.                   Brand Name Dispense Instructions for use Diagnosis    ALGISITE M 4\"X4\" Misc     3 each    Apply to wound with daily dressing change    Chronic ulcer of sacral region with fat layer exposed (H)       ALLEVYN GENTLE BORDER LITE Pads     30 each    Externally apply 1 Units topically daily Size of Dressing: 3   inch    Chronic ulcer of sacral region with fat layer exposed (H)       medroxyPROGESTERone 150 MG/ML injection    DEPO-PROVERA    1 mL    Inject 1 mL (150 mg) into the muscle every 3 months    Menorrhagia with irregular cycle       * methylphenidate 40 MG Cp24    RITALIN LA    30 capsule    Take 40 mg by mouth daily    Attention deficit hyperactivity disorder (ADHD), unspecified ADHD type, Controlled substance agreement signed       * methylphenidate 40 MG Cp24    RITALIN LA    30 capsule    Take 40 mg by mouth daily    Attention deficit hyperactivity disorder (ADHD), unspecified ADHD type, Controlled substance agreement signed       naproxen 500 MG tablet    NAPROSYN     Take 500 mg by mouth 2 times daily (with meals)        sodium " "chloride 0.9 % Soln     1 each    Cleanse wound with dressing changes    Chronic ulcer of sacral region with fat layer exposed (H)       [place] DRESSING 2\"X3-3/4\" Pads     30 each    1 Units daily    Chronic ulcer of sacral region with fat layer exposed (H)       triamcinolone 0.1 % cream    KENALOG     Apply  topically to affected area(s) 3 times daily.        * Notice:  This list has 2 medication(s) that are the same as other medications prescribed for you. Read the directions carefully, and ask your doctor or other care provider to review them with you.      "

## 2018-11-26 ENCOUNTER — OFFICE VISIT (OUTPATIENT)
Dept: FAMILY MEDICINE | Facility: OTHER | Age: 28
End: 2018-11-26
Attending: FAMILY MEDICINE
Payer: COMMERCIAL

## 2018-11-26 VITALS
DIASTOLIC BLOOD PRESSURE: 88 MMHG | TEMPERATURE: 97.9 F | WEIGHT: 212 LBS | BODY MASS INDEX: 37.55 KG/M2 | SYSTOLIC BLOOD PRESSURE: 132 MMHG | RESPIRATION RATE: 16 BRPM | HEART RATE: 88 BPM

## 2018-11-26 DIAGNOSIS — F90.9 ATTENTION DEFICIT HYPERACTIVITY DISORDER (ADHD), UNSPECIFIED ADHD TYPE: Primary | ICD-10-CM

## 2018-11-26 DIAGNOSIS — F41.9 ANXIETY: ICD-10-CM

## 2018-11-26 DIAGNOSIS — Z79.899 CONTROLLED SUBSTANCE AGREEMENT SIGNED: ICD-10-CM

## 2018-11-26 PROCEDURE — 80307 DRUG TEST PRSMV CHEM ANLYZR: CPT | Performed by: FAMILY MEDICINE

## 2018-11-26 PROCEDURE — 99214 OFFICE O/P EST MOD 30 MIN: CPT | Performed by: FAMILY MEDICINE

## 2018-11-26 RX ORDER — METHYLPHENIDATE HYDROCHLORIDE 40 MG/1
40 CAPSULE, EXTENDED RELEASE ORAL DAILY
Qty: 30 CAPSULE | Refills: 0 | Status: SHIPPED | OUTPATIENT
Start: 2018-11-26 | End: 2018-11-26

## 2018-11-26 RX ORDER — CLONAZEPAM 0.5 MG/1
0.25-0.5 TABLET ORAL 2 TIMES DAILY PRN
Qty: 60 TABLET | Refills: 1 | Status: SHIPPED | OUTPATIENT
Start: 2018-11-26 | End: 2019-02-05

## 2018-11-26 RX ORDER — METHYLPHENIDATE HYDROCHLORIDE 40 MG/1
40 CAPSULE, EXTENDED RELEASE ORAL DAILY
Qty: 30 CAPSULE | Refills: 0 | Status: SHIPPED | OUTPATIENT
Start: 2018-11-26 | End: 2019-07-02

## 2018-11-26 RX ORDER — ESCITALOPRAM OXALATE 10 MG/1
10 TABLET ORAL DAILY
Qty: 30 TABLET | Status: SHIPPED | OUTPATIENT
Start: 2018-11-26 | End: 2019-09-30

## 2018-11-26 ASSESSMENT — PAIN SCALES - GENERAL: PAINLEVEL: NO PAIN (0)

## 2018-11-26 NOTE — NURSING NOTE
"Chief Complaint   Patient presents with     Recheck Medication       Initial /88  Pulse 88  Temp 97.9  F (36.6  C) (Tympanic)  Resp 16  Wt 212 lb (96.2 kg)  Breastfeeding? No  BMI 37.55 kg/m2 Estimated body mass index is 37.55 kg/(m^2) as calculated from the following:    Height as of 5/7/18: 5' 3\" (1.6 m).    Weight as of this encounter: 212 lb (96.2 kg).  Medication Reconciliation: complete    Leanne Recinos LPN  "

## 2018-11-26 NOTE — LETTER
December 6, 2018      Tia Vázquez  [address]  [place] [address]        Dear Tia,     Your urine medication screen came back showing THC, the metabolite of marijuana. The remainder of the test showed your expected medications.  As you may recall, when we had you sign your controlled substance agreement, use of illegal substances is considered a violation of that contract.     If you use marijuana, I am not able to continue to prescribe your pain medication as it is a controlled substance.  At your next follow up visit, we will plan to repeat your urine test.  If it is appropriate, I will continue to prescribe your Ritalin.  It takes a month for THC to clear from your system.  If you choose to continue to use marijuana, then I will not be able to prescribe the Ritalin going forward.        Sincerely,        Nicho Peoples MD

## 2018-11-26 NOTE — PROGRESS NOTES
"Nursing Notes:   Leanne Recinos, LPN  11/26/2018  4:36 PM  Signed  Chief Complaint   Patient presents with     Recheck Medication       Initial /88  Pulse 88  Temp 97.9  F (36.6  C) (Tympanic)  Resp 16  Wt 212 lb (96.2 kg)  Breastfeeding? No  BMI 37.55 kg/m2 Estimated body mass index is 37.55 kg/(m^2) as calculated from the following:    Height as of 5/7/18: 5' 3\" (1.6 m).    Weight as of this encounter: 212 lb (96.2 kg).  Medication Reconciliation: complete    Leanne Recinos LPN      SUBJECTIVE:  Tia Vázquez  is a 28 year old female who comes in today for follow-up and medication management.  I last saw her in July at which time we renewed her medication and did a Depo-Provera shot.Last ToxAssure was in April.    She has a lot of nausea with work stress.  She is working 50+ hours per week.  She is feeling overwhelmed.  She has a lot of nausea associated with this and had a very extended workup but is pretty convinced that this is all related to anxiety.  She tried Zofran and it helped some but it made her shaky.  She was on gabapentin and it helped a bit, but not much. Hydroxyzine didn't work.  She has not been on an SSRI.  She does not really feel that she has generalized anxiety but certainly has situational anxiety.  She has a hard time falling asleep at night.  It will take her several hours despite trying good sleep hygiene.  She will wake up and think about what she has to do during the day.  She cannot shut her brain off at night and stresses and worries.    She has stopped Depo-Provera and is having regular menses.    Past Medical, Family, and Social History reviewed and updated as noted below.   ROS is negative except as noted above       Allergies   Allergen Reactions     Other  [No Clinical Screening - See Comments]      Other reaction(s): Throat Irritation  Carmex, Pine, Icy Hot     [employer] Cleansing Skin      Other reaction(s): Swelling     Menthol      Other reaction(s): Swelling "     Pine Tar      Other reaction(s): Runny Nose   ,   Family History   Problem Relation Age of Onset     Cancer Father      Cancer, h/o testicular CA     HEART DISEASE Maternal Grandmother      Heart Disease,cardiomyopathy     Blood Disease Maternal Grandmother      Blood Disease,blood dyscrasia   ,   Current Outpatient Prescriptions   Medication     clonazePAM (KLONOPIN) 0.5 MG tablet     escitalopram (LEXAPRO) 10 MG tablet     methylphenidate (RITALIN LA) 40 MG 24 hr capsule     methylphenidate (RITALIN LA) 40 MG CP24     methylphenidate (RITALIN LA) 40 MG CP24     naproxen (NAPROSYN) 500 MG tablet     triamcinolone (KENALOG) 0.1 % cream     Current Facility-Administered Medications   Medication     medroxyPROGESTERone (DEPO-PROVERA) injection 150 mg   ,   Past Medical History:   Diagnosis Date     Helicobacter pylori infection     No Comments Provided   ,   Patient Active Problem List    Diagnosis Date Noted     Controlled substance agreement signed 02/20/2018     Priority: Medium     Allergic rhinitis due to pollen 01/30/2018     Priority: Medium     Overview:   Spring and fall       Contact dermatitis and eczema 01/30/2018     Priority: Medium     Overview:   Left ear       Menorrhagia with irregular cycle 01/30/2018     Priority: Medium     Chronic ulcer of sacral region with fat layer exposed (H) 09/26/2017     Priority: Medium     Other granulomatous disorders of the skin and subcutaneous tissue 09/26/2017     Priority: Medium     Mass of perirectal soft tissue 04/12/2017     Priority: Medium     Screening for malignant neoplasm of cervix 03/17/2016     Priority: Medium     Overview:   2016 NILM  PLAN: PAP TEST 3/2019  ; PAP TEST HISTORY       ADHD 09/24/2010     Priority: Medium     Overview:   Evaluation from [facility] done.     ,   Past Surgical History:   Procedure Laterality Date     exision mass retrorectal and coccyx  06/09/2017    Ori Esp[facility]    and   [facility]  History   Substance Use Topics     Smoking status: Never Smoker     Smokeless tobacco: Never Used     Alcohol use 2.5 oz/week     OBJECTIVE:  /88  Pulse 88  Temp 97.9  F (36.6  C) (Tympanic)  Resp 16  Wt 212 lb (96.2 kg)  Breastfeeding? No  BMI 37.55 kg/m2   EXAM:  Alert and cooperative, no distress.  Affect is somewhat restricted.  Mood becomes appropriately tearful.  ASSESSMENT/Plan :    Tia was seen today for recheck medication.    Diagnoses and all orders for this visit:    Attention deficit hyperactivity disorder (ADHD), unspecified ADHD type  -     Drug  Screen Comprehensive , Urine with Reported Meds (MedTox) (Pain Care Package)  -     Discontinue: methylphenidate (RITALIN LA) 40 MG 24 hr capsule; Take 1 capsule (40 mg) by mouth daily  -     Discontinue: methylphenidate (RITALIN LA) 40 MG 24 hr capsule; Take 1 capsule (40 mg) by mouth daily  -     methylphenidate (RITALIN LA) 40 MG 24 hr capsule; Take 1 capsule (40 mg) by mouth daily    Controlled substance agreement signed  -     Drug  Screen Comprehensive , Urine with Reported Meds (MedTox) (Pain Care Package)  -     Discontinue: methylphenidate (RITALIN LA) 40 MG 24 hr capsule; Take 1 capsule (40 mg) by mouth daily  -     Discontinue: methylphenidate (RITALIN LA) 40 MG 24 hr capsule; Take 1 capsule (40 mg) by mouth daily  -     methylphenidate (RITALIN LA) 40 MG 24 hr capsule; Take 1 capsule (40 mg) by mouth daily    Anxiety  -     escitalopram (LEXAPRO) 10 MG tablet; Take 1 tablet (10 mg) by mouth daily  -     clonazePAM (KLONOPIN) 0.5 MG tablet; Take 0.5-1 tablets (0.25-0.5 mg) by mouth 2 times daily as needed for anxiety      Lengthy discussion with regard to anxiety and its pathophysiology and treatment options.  At this point, I think sleep is paramount so would like to try some clonazepam 0.5 mg at nighttime.  She will take 1 tablet at bedtime and if it is too strong she can cut it in half.  We will also use a half a tablet of  Klonopin as a rescue medication during the day if needed if it is not too sedating.  We will also begin on Lexapro 10 mg daily and instructed on use.  Could started to half a tablet if nausea is an issue.  She has stopped Depo-Provera     query is appropriate.  ToxAssure today.  Renewal on Ritalin LA 40 mg number 30 x 3.    Recommend follow-up in 1 month, sooner if needed.  A total of 25 minutes was spent with the patient, greater than 50% of the time was spent in counseling/discussion of the aforementioned concerns.     Nicho Peoples MD

## 2018-11-26 NOTE — MR AVS SNAPSHOT
"              After Visit Summary   2018    Tia Vázquez    MRN: [id number]           Patient Information     Date Of Birth          1990        Visit Information        Provider Department      2018 4:15 PM Nicho Peoples MD [facility]        Today's Diagnoses     Attention deficit hyperactivity disorder (ADHD), unspecified ADHD type    -  1    Controlled substance agreement signed        Anxiety           Follow-ups after your visit        Who to contact     If you have questions or need follow up information about today's clinic visit or your schedule please contact [facility] directly at [phone].  Normal or non-critical lab and imaging results will be communicated to you by [employer], letter or phone within 4 business days after the clinic has received the results. If you do not hear from us within 7 days, please contact the clinic through [employer] or phone. If you have a critical or abnormal lab result, we will notify you by phone as soon as possible.  Submit refill requests through [employer] or call your pharmacy and they will forward the refill request to us. Please allow 3 business days for your refill to be completed.          Additional Information About Your Visit        MyChart Information     [employer] lets you send messages to your doctor, view your test results, renew your prescriptions, schedule appointments and more. To sign up, go to [email or web address] . Click on \"Log in\" on the left side of the screen, which will take you to the Welcome page. Then click on \"Sign up Now\" on the right side of the page.     You will be asked to enter the access code listed below, as well as some personal information. Please follow the directions to create your username and password.     Your access code is: [email or web address]  Expires: 2019  5:45 PM     Your access code will  in 90 days. If you need help or a new code, please call your " [facility] or [phone].        Care EveryWhere ID     This is your Care EveryWhere ID. This could be used by other organizations to access your [place] medical records  [id number]        Your Vitals Were     Pulse Temperature Respirations Breastfeeding? BMI (Body Mass Index)       88 97.9  F (36.6  C) (Tympanic) 16 No 37.55 kg/m2        Blood Pressure from Last 3 Encounters:   11/26/18 132/88   07/09/18 122/86   05/16/18 124/82    Weight from Last 3 Encounters:   11/26/18 212 lb (96.2 kg)   07/09/18 208 lb (94.3 kg)   05/16/18 206 lb 9.6 oz (93.7 kg)              We Performed the Following     Drug  Screen Comprehensive , Urine with Reported Meds (MedTox) (Pain Care Package)          Today's Medication Changes          These changes are accurate as of 11/26/18  5:45 PM.  If you have any questions, ask your nurse or doctor.               Start taking these medicines.        Dose/Directions    clonazePAM 0.5 MG tablet   Commonly known as:  klonoPIN   Used for:  Anxiety   Started by:  Nicho Peoples MD        Dose:  0.25-0.5 mg   Take 0.5-1 tablets (0.25-0.5 mg) by mouth 2 times daily as needed for anxiety   Quantity:  60 tablet   Refills:  1       escitalopram 10 MG tablet   Commonly known as:  LEXAPRO   Used for:  Anxiety   Started by:  Nicho Peoples MD        Dose:  10 mg   Take 1 tablet (10 mg) by mouth daily   Quantity:  30 tablet   Refills:  prn         These medicines have changed or have updated prescriptions.        Dose/Directions    * methylphenidate 40 MG 24 hr capsule   Commonly known as:  RITALIN LA   This may have changed:  Another medication with the same name was added. Make sure you understand how and when to take each.   Used for:  Attention deficit hyperactivity disorder (ADHD), unspecified ADHD type, Controlled substance agreement signed   Changed by:  Nicho Peoples MD        Dose:  40 mg   Take 40 mg by mouth daily   Quantity:  30 capsule   Refills:  0       *  methylphenidate 40 MG 24 hr capsule   Commonly known as:  RITALIN LA   This may have changed:  Another medication with the same name was added. Make sure you understand how and when to take each.   Used for:  Attention deficit hyperactivity disorder (ADHD), unspecified ADHD type, Controlled substance agreement signed   Changed by:  Nicho Peoples MD        Dose:  40 mg   Take 40 mg by mouth daily   Quantity:  30 capsule   Refills:  0       * methylphenidate 40 MG 24 hr capsule   Commonly known as:  RITALIN LA   This may have changed:  You were already taking a medication with the same name, and this prescription was added. Make sure you understand how and when to take each.   Used for:  Attention deficit hyperactivity disorder (ADHD), unspecified ADHD type, Controlled substance agreement signed   Changed by:  Nicho Peoples MD        Dose:  40 mg   Take 1 capsule (40 mg) by mouth daily   Quantity:  30 capsule   Refills:  0       * Notice:  This list has 3 medication(s) that are the same as other medications prescribed for you. Read the directions carefully, and ask your doctor or other care provider to review them with you.      Stop taking these medicines if you haven't already. Please contact your care team if you have questions.     medroxyPROGESTERone 150 MG/ML injection   Commonly known as:  DEPO-PROVERA   Stopped by:  iNcho Peoples MD                Where to get your medicines      These medications were sent to [facility] [address] [place], [place] - [address], [facility], located within [facility] [address]     Phone:  [phone]     escitalopram 10 MG tablet         Some of these will need a paper prescription and others can be bought over the counter.  Ask your nurse if you have questions.     Bring a paper prescription for each of these medications     clonazePAM 0.5 MG tablet    methylphenidate 40 MG 24 hr capsule                Primary Care Provider Office  Phone # Fax #    [facility] [phone] [address]  [facility] [address]        Equal Access to Services     ROHINI WADE : Hadii aad ku hadcarminanoelle Daley, ldeadain brownelucinaha, aminah ramonajosh key, stephanie sofiain hayaagail tonyjackelyn andrews main maki. So [facility] [phone].    ATENCIÓN: Si habla español, tiene a harris disposición servicios gratuitos de asistencia lingüística. Llame al [phone].    We comply with applicable federal civil rights laws and [place] laws. We do not discriminate on the basis of race, color, national origin, age, disability, sex, sexual orientation, or gender identity.            Thank you!     Thank you for choosing [facility] AND [facility]  for your care. Our goal is always to provide you with excellent care. Hearing back from our patients is one way we can continue to improve our services. Please take a few minutes to complete the written survey that you may receive in the mail after your visit with us. Thank you!             Your Updated Medication List - Protect others around you: Learn how to safely use, store and throw away your medicines at www.disposemymeds.org.          This list is accurate as of 11/26/18  5:45 PM.  Always use your most recent med list.                   Brand Name Dispense Instructions for use Diagnosis    clonazePAM 0.5 MG tablet    klonoPIN    60 tablet    Take 0.5-1 tablets (0.25-0.5 mg) by mouth 2 times daily as needed for anxiety    Anxiety       escitalopram 10 MG tablet    LEXAPRO    30 tablet    Take 1 tablet (10 mg) by mouth daily    Anxiety       * methylphenidate 40 MG 24 hr capsule    RITALIN LA    30 capsule    Take 40 mg by mouth daily    Attention deficit hyperactivity disorder (ADHD), unspecified ADHD type, Controlled substance agreement signed       * methylphenidate 40 MG 24 hr capsule    RITALIN LA    30 capsule    Take 40 mg by mouth daily    Attention deficit hyperactivity disorder (ADHD), unspecified  ADHD type, Controlled substance agreement signed       * methylphenidate 40 MG 24 hr capsule    RITALIN LA    30 capsule    Take 1 capsule (40 mg) by mouth daily    Attention deficit hyperactivity disorder (ADHD), unspecified ADHD type, Controlled substance agreement signed       naproxen 500 MG tablet    NAPROSYN     Take 500 mg by mouth 2 times daily (with meals)        triamcinolone 0.1 % cream    KENALOG     Apply  topically to affected area(s) 3 times daily.        * Notice:  This list has 3 medication(s) that are the same as other medications prescribed for you. Read the directions carefully, and ask your doctor or other care provider to review them with you.

## 2018-12-03 LAB — PAIN DRUG SCR UR W RPTD MEDS: NORMAL

## 2019-02-05 ENCOUNTER — OFFICE VISIT (OUTPATIENT)
Dept: FAMILY MEDICINE | Facility: OTHER | Age: 29
End: 2019-02-05
Attending: FAMILY MEDICINE
Payer: COMMERCIAL

## 2019-02-05 VITALS
BODY MASS INDEX: 37.38 KG/M2 | HEART RATE: 100 BPM | TEMPERATURE: 97.6 F | WEIGHT: 211 LBS | SYSTOLIC BLOOD PRESSURE: 138 MMHG | DIASTOLIC BLOOD PRESSURE: 88 MMHG | RESPIRATION RATE: 16 BRPM

## 2019-02-05 DIAGNOSIS — F41.9 ANXIETY: ICD-10-CM

## 2019-02-05 DIAGNOSIS — Z79.899 CONTROLLED SUBSTANCE AGREEMENT SIGNED: ICD-10-CM

## 2019-02-05 DIAGNOSIS — F90.9 ATTENTION DEFICIT HYPERACTIVITY DISORDER (ADHD), UNSPECIFIED ADHD TYPE: Primary | ICD-10-CM

## 2019-02-05 PROCEDURE — 80307 DRUG TEST PRSMV CHEM ANLYZR: CPT | Performed by: FAMILY MEDICINE

## 2019-02-05 PROCEDURE — 99213 OFFICE O/P EST LOW 20 MIN: CPT | Performed by: FAMILY MEDICINE

## 2019-02-05 RX ORDER — METHYLPHENIDATE HYDROCHLORIDE 40 MG/1
40 CAPSULE, EXTENDED RELEASE ORAL DAILY
Qty: 30 CAPSULE | Refills: 0 | Status: SHIPPED | OUTPATIENT
Start: 2019-03-08 | End: 2019-04-07

## 2019-02-05 RX ORDER — CLONAZEPAM 1 MG/1
.5-1 TABLET ORAL 2 TIMES DAILY PRN
Qty: 60 TABLET | Refills: 5 | Status: SHIPPED | OUTPATIENT
Start: 2019-02-05 | End: 2019-07-22

## 2019-02-05 RX ORDER — METHYLPHENIDATE HYDROCHLORIDE 40 MG/1
40 CAPSULE, EXTENDED RELEASE ORAL DAILY
Qty: 30 CAPSULE | Refills: 0 | Status: SHIPPED | OUTPATIENT
Start: 2019-04-08 | End: 2019-05-08

## 2019-02-05 RX ORDER — METHYLPHENIDATE HYDROCHLORIDE 40 MG/1
40 CAPSULE, EXTENDED RELEASE ORAL DAILY
Qty: 30 CAPSULE | Refills: 0 | Status: SHIPPED | OUTPATIENT
Start: 2019-02-05 | End: 2019-03-07

## 2019-02-05 ASSESSMENT — PAIN SCALES - GENERAL: PAINLEVEL: NO PAIN (0)

## 2019-02-05 NOTE — NURSING NOTE
"Chief Complaint   Patient presents with     Recheck Medication       Initial BP (!) 136/96   Pulse 100   Temp 97.6  F (36.4  C) (Temporal)   Resp 16   Wt 95.7 kg (211 lb)   Breastfeeding? No   BMI 37.38 kg/m   Estimated body mass index is 37.38 kg/m  as calculated from the following:    Height as of 5/7/18: 1.6 m (5' 3\").    Weight as of this encounter: 95.7 kg (211 lb).  Medication Reconciliation: complete    Leanne Recinos LPN  "

## 2019-02-05 NOTE — PROGRESS NOTES
"Nursing Notes:   Leanne Recinos, LPN  2/5/2019  4:29 PM  Signed  Chief Complaint   Patient presents with     Recheck Medication       Initial BP (!) 136/96   Pulse 100   Temp 97.6  F (36.4  C) (Temporal)   Resp 16   Wt 95.7 kg (211 lb)   Breastfeeding? No   BMI 37.38 kg/m    Estimated body mass index is 37.38 kg/m  as calculated from the following:    Height as of 5/7/18: 1.6 m (5' 3\").    Weight as of this encounter: 95.7 kg (211 lb).  Medication Reconciliation: complete    Leanne Recinos LPN  SUBJECTIVE:  Tia Vázquez  is a 28 year old female who comes in today for follow-up and medication management.    I last saw her in November.  She was having significant stressors with a history as follows:\"She has a lot of nausea with work stress.  She is working 50+ hours per week.  She is feeling overwhelmed.  She has a lot of nausea associated with this and had a very extended workup but is pretty convinced that this is all related to anxiety.  She tried Zofran and it helped some but it made her shaky.  She was on gabapentin and it helped a bit, but not much. Hydroxyzine didn't work.  She has not been on an SSRI.  She does not really feel that she has generalized anxiety but certainly has situational anxiety.  She has a hard time falling asleep at night.  It will take her several hours despite trying good sleep hygiene.  She will wake up and think about what she has to do during the day.  She cannot shut her brain off at night and stresses and worries.\"  We elected to place her on clonazepam at nighttime and began Lexapro 10 mg daily.  We renewed her Ritalin and recommended follow-up in a month to touch base with regard to her anxiety and nausea.  ToxAssure was positive for THC and she needs a repeat of same.    ZOEY-7 SCORE 3/12/2018 4/23/2018   Total Score 0 0       ZOEY-7 SCORE 3/12/2018 4/23/2018   Total Score 0 0       She has had 3 deaths in the last few weeks. She feels a bit more sleepy during the " day. She is not crying or nauseated. She is taking the Klonopin at bedtime. At times she will take an extra dose.  Most days she will take 2 tab. she is not sure if the Lexapro was helping but she feels a little bit tired during the day.  The clonazepam works the best if she takes 1 mg.  She is having no untoward effects.    Work stress has not really changed but she has her annual review coming up this week and will address some of her concerns with her supervisors.    Past Medical, Family, and Social History reviewed and updated as noted below.   ROS is negative except as noted above       Allergies   Allergen Reactions     Other  [No Clinical Screening - See Comments]      Other reaction(s): Throat Irritation  Carmex, Pine, Icy Hot     [facility] Cleansing Skin      Other reaction(s): Swelling     Menthol      Other reaction(s): Swelling     Pine Tar      Other reaction(s): Runny Nose   ,   Family History   Problem Relation Age of Onset     Cancer Father         Cancer, h/o testicular CA     Heart Disease Maternal Grandmother         Heart Disease,cardiomyopathy     Blood Disease Maternal Grandmother         Blood Disease,blood dyscrasia   ,   Current Outpatient Medications   Medication     clonazePAM (KLONOPIN) 1 MG tablet     escitalopram (LEXAPRO) 10 MG tablet     methylphenidate (RITALIN LA) 40 MG 24 hr capsule     [START ON 3/8/2019] methylphenidate (RITALIN LA) 40 MG 24 hr capsule     [START ON 4/8/2019] methylphenidate (RITALIN LA) 40 MG 24 hr capsule     methylphenidate (RITALIN LA) 40 MG 24 hr capsule     methylphenidate (RITALIN LA) 40 MG CP24     methylphenidate (RITALIN LA) 40 MG CP24     naproxen (NAPROSYN) 500 MG tablet     triamcinolone (KENALOG) 0.1 % cream     Current Facility-Administered Medications   Medication     medroxyPROGESTERone (DEPO-PROVERA) injection 150 mg   ,   Past Medical History:   Diagnosis Date     Helicobacter pylori infection     No Comments Provided   ,   Patient Active Problem  List    Diagnosis Date Noted     Anxiety 02/05/2019     Priority: Medium     Controlled substance agreement signed 02/20/2018 02/20/2018     Priority: Medium     Allergic rhinitis due to pollen 01/30/2018     Priority: Medium     Overview:   Spring and fall       Contact dermatitis and eczema 01/30/2018     Priority: Medium     Overview:   Left ear       Menorrhagia with irregular cycle 01/30/2018     Priority: Medium     Chronic ulcer of sacral region with fat layer exposed (H) 09/26/2017     Priority: Medium     Other granulomatous disorders of the skin and subcutaneous tissue 09/26/2017     Priority: Medium     Mass of perirectal soft tissue 04/12/2017     Priority: Medium     Screening for malignant neoplasm of cervix 03/17/2016     Priority: Medium     Overview:   2016 NILM  PLAN: PAP TEST 3/2019  ; PAP TEST HISTORY       ADHD 09/24/2010     Priority: Medium     Overview:   Evaluation from [facility] Counselling done.     ,   Past Surgical History:   Procedure Laterality Date     exision mass retrorectal and coccyx  06/09/2017    Ori Espinoza MD at [facility]    and   Social History     Tobacco Use     Smoking status: Never Smoker     Smokeless tobacco: Never Used   Substance Use Topics     Alcohol use: Yes     Alcohol/week: 2.5 oz     OBJECTIVE:  BP (!) 136/96   Pulse 100   Temp 97.6  F (36.4  C) (Temporal)   Resp 16   Wt 95.7 kg (211 lb)   Breastfeeding? No   BMI 37.38 kg/m     EXAM:  Alert and cooperative, no distress.  Affect is more relaxed and broad ranging.  ASSESSMENT/Plan :    Tia was seen today for recheck medication.    Diagnoses and all orders for this visit:    Attention deficit hyperactivity disorder (ADHD), unspecified ADHD type  -     Drug  Screen Comprehensive , Urine with Reported Meds (MedTox) (Pain Care Package)  -     methylphenidate (RITALIN LA) 40 MG 24 hr capsule; Take 1 capsule (40 mg) by mouth daily  -     methylphenidate (RITALIN LA) 40 MG 24 hr capsule; Take 1  capsule (40 mg) by mouth daily  -     methylphenidate (RITALIN LA) 40 MG 24 hr capsule; Take 1 capsule (40 mg) by mouth daily    Controlled substance agreement signed 02/20/2018  -     Drug  Screen Comprehensive , Urine with Reported Meds (MedTox) (Pain Care Package)  -     methylphenidate (RITALIN LA) 40 MG 24 hr capsule; Take 1 capsule (40 mg) by mouth daily  -     methylphenidate (RITALIN LA) 40 MG 24 hr capsule; Take 1 capsule (40 mg) by mouth daily  -     methylphenidate (RITALIN LA) 40 MG 24 hr capsule; Take 1 capsule (40 mg) by mouth daily    Anxiety  -     clonazePAM (KLONOPIN) 1 MG tablet; Take 0.5-1 tablets (0.5-1 mg) by mouth 2 times daily as needed for anxiety      Support and encouragement offered with regard to her psychosocial stressors and grief reactions.  Will adjust dose of clonazepam to 1 mg at bedtime.   query is appropriate.  ToxAssure today.  Renewal on Ritalin LA 40 mg #30 x 3.  Follow-up in 3 months, sooner if needed    A total of 15 minutes was spent with the patient, greater than 50% of the time was spent in counseling/discussion of the aforementioned concerns.     Nicho Peoples MD

## 2019-02-11 LAB — PAIN DRUG SCR UR W RPTD MEDS: NORMAL

## 2019-06-10 ENCOUNTER — TELEPHONE (OUTPATIENT)
Dept: FAMILY MEDICINE | Facility: OTHER | Age: 29
End: 2019-06-10

## 2019-06-10 DIAGNOSIS — Z79.899 CONTROLLED SUBSTANCE AGREEMENT SIGNED: ICD-10-CM

## 2019-06-10 DIAGNOSIS — F90.9 ATTENTION DEFICIT HYPERACTIVITY DISORDER (ADHD), UNSPECIFIED ADHD TYPE: ICD-10-CM

## 2019-06-10 RX ORDER — METHYLPHENIDATE HYDROCHLORIDE 40 MG/1
40 CAPSULE, EXTENDED RELEASE ORAL DAILY
Qty: 19 CAPSULE | Refills: 0 | Status: SHIPPED | OUTPATIENT
Start: 2019-06-10 | End: 2019-11-26

## 2019-06-10 NOTE — TELEPHONE ENCOUNTER
Patient called back. Full name and  verified. Patient has about 5 tabs left of Ritalin doses. She has appt with PCP on 19. PCP is out until .     Jen Fitzpatrick LPN on 6/10/2019 at 11:08 AM

## 2019-06-10 NOTE — TELEPHONE ENCOUNTER
Called and left message for patient to please return call.     Jen Fitzpatrick LPN on 6/10/2019 at 10:34 AM

## 2019-06-10 NOTE — TELEPHONE ENCOUNTER
Patient is needing an appointment for her medication to be refilled. She is on the schedule for 07.02.19, but states she will run out of medication sooner than that. She would like to be worked into the schedule.  Please return call. Thank You!

## 2019-06-11 ENCOUNTER — TELEPHONE (OUTPATIENT)
Dept: FAMILY MEDICINE | Facility: OTHER | Age: 29
End: 2019-06-11

## 2019-07-02 ENCOUNTER — OFFICE VISIT (OUTPATIENT)
Dept: FAMILY MEDICINE | Facility: OTHER | Age: 29
End: 2019-07-02
Attending: FAMILY MEDICINE
Payer: COMMERCIAL

## 2019-07-02 VITALS
DIASTOLIC BLOOD PRESSURE: 84 MMHG | SYSTOLIC BLOOD PRESSURE: 116 MMHG | HEART RATE: 68 BPM | BODY MASS INDEX: 35.39 KG/M2 | TEMPERATURE: 96.9 F | WEIGHT: 199.8 LBS

## 2019-07-02 DIAGNOSIS — L30.9 ECZEMA, UNSPECIFIED TYPE: ICD-10-CM

## 2019-07-02 DIAGNOSIS — Z79.899 CONTROLLED SUBSTANCE AGREEMENT SIGNED: ICD-10-CM

## 2019-07-02 DIAGNOSIS — F90.9 ATTENTION DEFICIT HYPERACTIVITY DISORDER (ADHD), UNSPECIFIED ADHD TYPE: Primary | ICD-10-CM

## 2019-07-02 DIAGNOSIS — F41.9 ANXIETY: ICD-10-CM

## 2019-07-02 PROCEDURE — 99213 OFFICE O/P EST LOW 20 MIN: CPT | Performed by: FAMILY MEDICINE

## 2019-07-02 RX ORDER — METHYLPHENIDATE HYDROCHLORIDE 40 MG/1
40 CAPSULE, EXTENDED RELEASE ORAL DAILY
Qty: 30 CAPSULE | Refills: 0 | Status: SHIPPED | OUTPATIENT
Start: 2019-07-02 | End: 2019-09-30

## 2019-07-02 RX ORDER — METHYLPHENIDATE HYDROCHLORIDE 40 MG/1
40 CAPSULE, EXTENDED RELEASE ORAL DAILY
Qty: 30 CAPSULE | Refills: 0 | Status: SHIPPED | OUTPATIENT
Start: 2019-07-02 | End: 2019-07-02

## 2019-07-02 RX ORDER — TRIAMCINOLONE ACETONIDE 1 MG/G
CREAM TOPICAL 3 TIMES DAILY
Qty: 80 G | Refills: 3 | Status: SHIPPED | OUTPATIENT
Start: 2019-07-02 | End: 2023-05-25

## 2019-07-02 ASSESSMENT — ANXIETY QUESTIONNAIRES
1. FEELING NERVOUS, ANXIOUS, OR ON EDGE: SEVERAL DAYS
3. WORRYING TOO MUCH ABOUT DIFFERENT THINGS: SEVERAL DAYS
2. NOT BEING ABLE TO STOP OR CONTROL WORRYING: SEVERAL DAYS
GAD7 TOTAL SCORE: 6
IF YOU CHECKED OFF ANY PROBLEMS ON THIS QUESTIONNAIRE, HOW DIFFICULT HAVE THESE PROBLEMS MADE IT FOR YOU TO DO YOUR WORK, TAKE CARE OF THINGS AT HOME, OR GET ALONG WITH OTHER PEOPLE: NOT DIFFICULT AT ALL
5. BEING SO RESTLESS THAT IT IS HARD TO SIT STILL: MORE THAN HALF THE DAYS
7. FEELING AFRAID AS IF SOMETHING AWFUL MIGHT HAPPEN: NOT AT ALL
6. BECOMING EASILY ANNOYED OR IRRITABLE: NOT AT ALL

## 2019-07-02 ASSESSMENT — PATIENT HEALTH QUESTIONNAIRE - PHQ9: 5. POOR APPETITE OR OVEREATING: SEVERAL DAYS

## 2019-07-02 ASSESSMENT — PAIN SCALES - GENERAL: PAINLEVEL: NO PAIN (0)

## 2019-07-02 NOTE — NURSING NOTE
"Chief Complaint   Patient presents with     Recheck Medication       Initial /84   Pulse 68   Temp 96.9  F (36.1  C) (Temporal)   Wt 90.6 kg (199 lb 12.8 oz)   Breastfeeding? No   BMI 35.39 kg/m   Estimated body mass index is 35.39 kg/m  as calculated from the following:    Height as of 5/7/18: 1.6 m (5' 3\").    Weight as of this encounter: 90.6 kg (199 lb 12.8 oz).  Medication Reconciliation: complete    Leanne Recinos LPN  "

## 2019-07-02 NOTE — PROGRESS NOTES
"Nursing Notes:   Leanne Recinos LPN  7/2/2019  8:23 AM  Signed  Chief Complaint   Patient presents with     Recheck Medication       Initial /84   Pulse 68   Temp 96.9  F (36.1  C) (Temporal)   Wt 90.6 kg (199 lb 12.8 oz)   Breastfeeding? No   BMI 35.39 kg/m    Estimated body mass index is 35.39 kg/m  as calculated from the following:    Height as of 5/7/18: 1.6 m (5' 3\").    Weight as of this encounter: 90.6 kg (199 lb 12.8 oz).  Medication Reconciliation: complete    Leanne Recinos LPN    SUBJECTIVE:  Tia Sethi  is a 28 year old female who comes in today for follow-up and medication management.  I last saw her in February.  At that time, we increased her clonazepam to 1 mg at bedtime and she takes it twice daily  and continued on Lexapro 10 mg daily.  She continues on Ritalin LA 40mg daily.     PHQ-9 SCORE 4/24/2018 5/7/2018 5/16/2018   PHQ-9 Total Score 0 0 0     ZOEY-7 SCORE 3/12/2018 4/23/2018 7/2/2019   Total Score 0 0 6           Past Medical, Family, and Social History reviewed and updated as noted below.   ROS is negative except as noted above       Allergies   Allergen Reactions     Other  [No Clinical Screening - See Comments]      Other reaction(s): Throat Irritation  Carmex, Pine, Icy Hot     [employer] Cleansing Skin      Other reaction(s): Swelling     Menthol      Other reaction(s): Swelling     Pine Tar      Other reaction(s): Runny Nose   ,   Family History   Problem Relation Age of Onset     Cancer Father         Cancer, h/o testicular CA     Heart Disease Maternal Grandmother         Heart Disease,cardiomyopathy     Blood Disease Maternal Grandmother         Blood Disease,blood dyscrasia   ,   Current Outpatient Medications   Medication     clonazePAM (KLONOPIN) 1 MG tablet     escitalopram (LEXAPRO) 10 MG tablet     methylphenidate (RITALIN LA) 40 MG 24 hr capsule     methylphenidate (RITALIN LA) 40 MG CP24     naproxen (NAPROSYN) 500 MG tablet     triamcinolone (KENALOG) 0.1 " % external cream     Current Facility-Administered Medications   Medication     medroxyPROGESTERone (DEPO-PROVERA) injection 150 mg   ,   Past Medical History:   Diagnosis Date     Helicobacter pylori infection     No Comments Provided   ,   Patient Active Problem List    Diagnosis Date Noted     Anxiety 02/05/2019     Priority: Medium     Controlled substance agreement signed 2/20/18, 7/2/19 02/20/2018     Priority: Medium     Allergic rhinitis due to pollen 01/30/2018     Priority: Medium     Overview:   Spring and fall       Contact dermatitis and eczema 01/30/2018     Priority: Medium     Overview:   Left ear       Menorrhagia with irregular cycle 01/30/2018     Priority: Medium     Chronic ulcer of sacral region with fat layer exposed (H) 09/26/2017     Priority: Medium     Other granulomatous disorders of the skin and subcutaneous tissue 09/26/2017     Priority: Medium     Mass of perirectal soft tissue 04/12/2017     Priority: Medium     Screening for malignant neoplasm of cervix 03/17/2016     Priority: Medium     Overview:   2016 NILM  PLAN: PAP TEST 3/2019  ; PAP TEST HISTORY       ADHD 09/24/2010     Priority: Medium     Overview:   Evaluation from [facility] Counselling done.     ,   Past Surgical History:   Procedure Laterality Date     exision mass retrorectal and coccyx  06/09/2017    Ori Espinoza MD at [facility]    and   Social History     Tobacco Use     Smoking status: Never Smoker     Smokeless tobacco: Never Used   Substance Use Topics     Alcohol use: Yes     Alcohol/week: 2.5 oz     OBJECTIVE:  /84   Pulse 68   Temp 96.9  F (36.1  C) (Temporal)   Wt 90.6 kg (199 lb 12.8 oz)   Breastfeeding? No   BMI 35.39 kg/m     EXAM:  Alert cooperative, no distress.  Affect is broad ranging and appropriate.  Depression and anxiety inventories as noted above.  ASSESSMENT/Plan :    Tia was seen today for recheck medication.    Diagnoses and all orders for this visit:    Attention deficit  hyperactivity disorder (ADHD), unspecified ADHD type  -     Discontinue: methylphenidate (RITALIN LA) 40 MG 24 hr capsule; Take 1 capsule (40 mg) by mouth daily  -     Discontinue: methylphenidate (RITALIN LA) 40 MG 24 hr capsule; Take 1 capsule (40 mg) by mouth daily  -     methylphenidate (RITALIN LA) 40 MG 24 hr capsule; Take 1 capsule (40 mg) by mouth daily    Controlled substance agreement signed 02/20/2018. 7/2/19  -     Discontinue: methylphenidate (RITALIN LA) 40 MG 24 hr capsule; Take 1 capsule (40 mg) by mouth daily  -     Discontinue: methylphenidate (RITALIN LA) 40 MG 24 hr capsule; Take 1 capsule (40 mg) by mouth daily  -     methylphenidate (RITALIN LA) 40 MG 24 hr capsule; Take 1 capsule (40 mg) by mouth daily    Anxiety    Controlled substance agreement signed  -     Discontinue: methylphenidate (RITALIN LA) 40 MG 24 hr capsule; Take 1 capsule (40 mg) by mouth daily  -     Discontinue: methylphenidate (RITALIN LA) 40 MG 24 hr capsule; Take 1 capsule (40 mg) by mouth daily  -     methylphenidate (RITALIN LA) 40 MG 24 hr capsule; Take 1 capsule (40 mg) by mouth daily    Eczema, unspecified type  -     triamcinolone (KENALOG) 0.1 % external cream; Apply topically 3 times daily Apply  topically to affected area(s) 3 times daily.       query is appropriate.  Renewed medication agreement today.  Renewal on Ritalin LA 40 mg #30×3    Renewed triamcinolone.    Follow-up in 3 months, sooner if needed.      A total of 15 minutes was spent with the patient, greater than 50% of the time was spent in counseling/discussion of the aforementioned concerns.     Nicho Peoples MD

## 2019-07-03 ASSESSMENT — ANXIETY QUESTIONNAIRES: GAD7 TOTAL SCORE: 6

## 2019-07-20 DIAGNOSIS — F41.9 ANXIETY: ICD-10-CM

## 2019-07-22 DIAGNOSIS — F41.9 ANXIETY: ICD-10-CM

## 2019-07-22 NOTE — TELEPHONE ENCOUNTER
Pt returned call and verified dose as taking 2 Klonopin daily.  Relayed to pt that request was sent to teamlet for consideration and that it would be rerouted to PCP for review tomorrow if not able to be addressed today.  Pt appreciative and will wait to hear back from us for an update.    Natalie Antoine RN  ....................  7/22/2019   4:18 PM

## 2019-07-22 NOTE — TELEPHONE ENCOUNTER
Pt stated that she is out of   Clonazepam    Lasted ordered 2/5/2019 for 6 month supply at max daily dose.     Confirmed that pt had no more refills on file at [facility] and had picked up last fill in the end of June.     LOV with PCP 7/2/2019-med check  Advised to f/u in 3 months  No changes noted to requested medication    No upcoming appt noted at this time.    Unable to reach pt    Will route to teamlet for consideration    Natalie Antoine RN  ....................  7/22/2019   3:25 PM

## 2019-07-23 RX ORDER — CLONAZEPAM 1 MG/1
TABLET ORAL
Qty: 60 TABLET | Refills: 0 | Status: SHIPPED | OUTPATIENT
Start: 2019-07-23 | End: 2019-08-14

## 2019-07-24 RX ORDER — CLONAZEPAM 1 MG/1
TABLET ORAL
Qty: 60 TABLET | Refills: 0 | OUTPATIENT
Start: 2019-07-24

## 2019-07-24 NOTE — TELEPHONE ENCOUNTER
Redundant refill request refused: Too soon:    clonazePAM (KLONOPIN) 1 MG tablet 60 tablet 0 7/23/2019  No   Sig: TAKE 1/2 TO 1 TABLET BY MOUTH TWICE DAILY AS NEEDED FOR ANXIETY   Class: Local Print   Order: [id number]     [id number] - [place], [place] - [address]     Unable to complete prescription refill per RN Medication Refill Policy. Shannan Villanueva RN .............. 7/24/2019  1:28 PM

## 2019-08-14 ENCOUNTER — TELEPHONE (OUTPATIENT)
Dept: FAMILY MEDICINE | Facility: OTHER | Age: 29
End: 2019-08-14

## 2019-08-14 DIAGNOSIS — F41.9 ANXIETY: ICD-10-CM

## 2019-08-15 NOTE — TELEPHONE ENCOUNTER
Received request to refill Clonazepam from [facility].    clonazePAM (KLONOPIN) 1 MG tablet  TAKE 1/2 TO 1 TABLET BY MOUTH TWICE DAILY AS NEEDED FOR ANXIETY    Last Written Prescription Date:  7/23/2019    Last Fill Quantity: 60,   # refills: 0    Last Office Visit: 7/2/2019    Future Office visit:    Next 5 appointments (look out 90 days)    Sep 30, 2019  4:15 PM CDT  Office Visit with Nicho HUBBARD MD  [facility] [address]  [facility] [address]  [phone]        Routing refill request to provider for review/approval because:  Drug not on the [facility], [facility] or [facility] refill protocol or controlled substance    Requested Prescriptions   Pending Prescriptions Disp Refills     clonazePAM (KLONOPIN) 1 MG tablet [Pharmacy Med Name: CLONAZEPAM 1MG TABLETS] 60 tablet 0     Sig: TAKE 1/2 TO 1 TABLET BY MOUTH TWICE DAILY AS NEEDED FOR ANXIETY.       There is no refill protocol information for this order        Unable to complete prescription refill per RN Medication Refill Policy.................... Lucy Mcdonald ....................  8/15/2019   12:17 PM

## 2019-08-19 RX ORDER — CLONAZEPAM 1 MG/1
TABLET ORAL
Qty: 60 TABLET | Refills: 5 | Status: SHIPPED | OUTPATIENT
Start: 2019-08-19 | End: 2019-11-26

## 2019-09-06 NOTE — TELEPHONE ENCOUNTER
Left message to call back.  Allegra Polo LPN ....................  6/11/2019   1:09 PM      
Left message with notification that prescription is ready for  at the [address] [facility].    Shannan Aguirre LPN............6/12/2019 11:40 AM    
Pt returned your call.  
RX at [address] [facility].  Allegra Polo LPN........................6/11/2019  4:08 PM    
06-Sep-2019 14:59
06-Sep-2019 15:20

## 2019-09-30 ENCOUNTER — OFFICE VISIT (OUTPATIENT)
Dept: FAMILY MEDICINE | Facility: OTHER | Age: 29
End: 2019-09-30
Attending: FAMILY MEDICINE
Payer: COMMERCIAL

## 2019-09-30 VITALS
WEIGHT: 205.8 LBS | SYSTOLIC BLOOD PRESSURE: 120 MMHG | RESPIRATION RATE: 16 BRPM | BODY MASS INDEX: 36.46 KG/M2 | DIASTOLIC BLOOD PRESSURE: 88 MMHG | OXYGEN SATURATION: 97 % | TEMPERATURE: 97.8 F | HEART RATE: 104 BPM

## 2019-09-30 DIAGNOSIS — Z79.899 CONTROLLED SUBSTANCE AGREEMENT SIGNED: ICD-10-CM

## 2019-09-30 DIAGNOSIS — F90.9 ATTENTION DEFICIT HYPERACTIVITY DISORDER (ADHD), UNSPECIFIED ADHD TYPE: Primary | ICD-10-CM

## 2019-09-30 DIAGNOSIS — F41.9 ANXIETY: ICD-10-CM

## 2019-09-30 DIAGNOSIS — Z23 NEEDS FLU SHOT: ICD-10-CM

## 2019-09-30 PROCEDURE — 90686 IIV4 VACC NO PRSV 0.5 ML IM: CPT | Performed by: FAMILY MEDICINE

## 2019-09-30 PROCEDURE — 90471 IMMUNIZATION ADMIN: CPT | Performed by: FAMILY MEDICINE

## 2019-09-30 PROCEDURE — 80307 DRUG TEST PRSMV CHEM ANLYZR: CPT | Mod: ZL | Performed by: FAMILY MEDICINE

## 2019-09-30 PROCEDURE — 99213 OFFICE O/P EST LOW 20 MIN: CPT | Mod: 25 | Performed by: FAMILY MEDICINE

## 2019-09-30 RX ORDER — METHYLPHENIDATE HYDROCHLORIDE 40 MG/1
40 CAPSULE, EXTENDED RELEASE ORAL DAILY
Qty: 30 CAPSULE | Refills: 0 | Status: SHIPPED | OUTPATIENT
Start: 2019-09-30 | End: 2019-11-26

## 2019-09-30 RX ORDER — METHYLPHENIDATE HYDROCHLORIDE 40 MG/1
40 CAPSULE, EXTENDED RELEASE ORAL DAILY
Qty: 30 CAPSULE | Refills: 0 | Status: SHIPPED | OUTPATIENT
Start: 2019-09-30 | End: 2019-09-30

## 2019-09-30 RX ORDER — ESCITALOPRAM OXALATE 10 MG/1
10 TABLET ORAL DAILY
Qty: 90 TABLET | Refills: 11 | Status: SHIPPED | OUTPATIENT
Start: 2019-09-30 | End: 2020-07-09

## 2019-09-30 ASSESSMENT — PAIN SCALES - GENERAL: PAINLEVEL: NO PAIN (0)

## 2019-09-30 NOTE — PROGRESS NOTES
"Nursing Notes:   Leanne Recinos LPN  9/30/2019  4:45 PM  Signed  Chief Complaint   Patient presents with     Recheck Medication       Initial /88   Pulse 104   Temp 97.8  F (36.6  C) (Tympanic)   Resp 16   Wt 93.4 kg (205 lb 12.8 oz)   SpO2 97%   Breastfeeding? No   BMI 36.46 kg/m    Estimated body mass index is 36.46 kg/m  as calculated from the following:    Height as of 5/7/18: 1.6 m (5' 3\").    Weight as of this encounter: 93.4 kg (205 lb 12.8 oz).  Medication Reconciliation: complete    IVAN Tavares Kelly K., LPN  9/30/2019  5:09 PM  Signed  Chief Complaint   Patient presents with     Recheck Medication     Immunization Documentation    Prior to Immunization administration, verified patients identity using patient's name and date of birth. Please see IMMUNIZATIONS  and order for additional information.  Patient / Parent instructed to remain in clinic for 15 minutes and report any adverse reaction to staff immediately.    Was entire vial of medication used? Yes  Vial/Syringe: Syringe    Leanne Recinos LPN  9/30/2019   5:09 PM      SUBJECTIVE:  Tia Sethi  is a 28 year old female who comes in today for follow-up and medication management.  I last saw her in July.  She continues on Ritalin 40 LA for ADHD.  She is due for ToxAssure.    She is on Lexapro 10 mg daily for anxiety and Klonopin twice daily scheduled for relief of anxiety. She stopped the Lexapro for a couple weeks. She has restarted it.  She thought maybe she was having some sexual side effects but they were fairly mild.      PHQ-9 SCORE 4/24/2018 5/7/2018 5/16/2018   PHQ-9 Total Score 0 0 0     ZOEY-7 SCORE 3/12/2018 4/23/2018 7/2/2019   Total Score 0 0 6           Past Medical, Family, and Social History reviewed and updated as noted below.   ROS is negative except as noted above       Allergies   Allergen Reactions     Other  [No Clinical Screening - See Comments]      Other reaction(s): Throat " Irritation  Carmex, [place], Icy Hot     [facility] Cleansing Skin      Other reaction(s): Swelling     Menthol      Other reaction(s): Swelling     Pine Tar      Other reaction(s): Runny Nose   ,   Family History   Problem Relation Age of Onset     Cancer Father         Cancer, h/o testicular CA     Heart Disease Maternal Grandmother         Heart Disease,cardiomyopathy     Blood Disease Maternal Grandmother         Blood Disease,blood dyscrasia   ,   Current Outpatient Medications   Medication     clonazePAM (KLONOPIN) 1 MG tablet     escitalopram (LEXAPRO) 10 MG tablet     methylphenidate (RITALIN LA) 40 MG 24 hr capsule     methylphenidate (RITALIN LA) 40 MG CP24     naproxen (NAPROSYN) 500 MG tablet     triamcinolone (KENALOG) 0.1 % external cream     Current Facility-Administered Medications   Medication     medroxyPROGESTERone (DEPO-PROVERA) injection 150 mg   ,   Past Medical History:   Diagnosis Date     Helicobacter pylori infection     No Comments Provided   ,   Patient Active Problem List    Diagnosis Date Noted     Anxiety 02/05/2019     Priority: Medium     Controlled substance agreement signed 2/20/18, 7/2/19 02/20/2018     Priority: Medium     Allergic rhinitis due to pollen 01/30/2018     Priority: Medium     Overview:   Spring and fall       Contact dermatitis and eczema 01/30/2018     Priority: Medium     Overview:   Left ear       Menorrhagia with irregular cycle 01/30/2018     Priority: Medium     Chronic ulcer of sacral region with fat layer exposed (H) 09/26/2017     Priority: Medium     Other granulomatous disorders of the skin and subcutaneous tissue 09/26/2017     Priority: Medium     Mass of perirectal soft tissue 04/12/2017     Priority: Medium     Screening for malignant neoplasm of cervix 03/17/2016     Priority: Medium     Overview:   2016 [facility]  PLAN: PAP TEST 3/2019  ; PAP TEST HISTORY       ADHD 09/24/2010     Priority: Medium     Overview:   Evaluation from [facility] done.     ,    Past Surgical History:   Procedure Laterality Date     exision mass retrorectal and coccyx  06/09/2017    Ori Espinoza MD at [facility]    and   Social History     Tobacco Use     Smoking status: Never Smoker     Smokeless tobacco: Never Used   Substance Use Topics     Alcohol use: Yes     Alcohol/week: 4.2 standard drinks     OBJECTIVE:  /88   Pulse 104   Temp 97.8  F (36.6  C) (Tympanic)   Resp 16   Wt 93.4 kg (205 lb 12.8 oz)   SpO2 97%   Breastfeeding? No   BMI 36.46 kg/m     EXAM:  Alert and cooperative, no distress.  Affect is appropriate.  No formal thought disorder.  Depression and anxiety inventories as noted above were not repeated today.  ASSESSMENT/Plan :    Tia was seen today for recheck medication.    Diagnoses and all orders for this visit:    Attention deficit hyperactivity disorder (ADHD), unspecified ADHD type  -     Drug  Screen Comprehensive , Urine with Reported Meds (MedTox) (Pain Care Package)  -     Discontinue: methylphenidate (RITALIN LA) 40 MG 24 hr capsule; Take 1 capsule (40 mg) by mouth daily  -     Discontinue: methylphenidate (RITALIN LA) 40 MG 24 hr capsule; Take 1 capsule (40 mg) by mouth daily  -     methylphenidate (RITALIN LA) 40 MG 24 hr capsule; Take 1 capsule (40 mg) by mouth daily    Controlled substance agreement signed 2/20/18, 7/2/19  -     Drug  Screen Comprehensive , Urine with Reported Meds (MedTox) (Pain Care Package)  -     Discontinue: methylphenidate (RITALIN LA) 40 MG 24 hr capsule; Take 1 capsule (40 mg) by mouth daily  -     Discontinue: methylphenidate (RITALIN LA) 40 MG 24 hr capsule; Take 1 capsule (40 mg) by mouth daily  -     methylphenidate (RITALIN LA) 40 MG 24 hr capsule; Take 1 capsule (40 mg) by mouth daily    Anxiety  -     Drug  Screen Comprehensive , Urine with Reported Meds (MedTox) (Pain Care Package)  -     escitalopram (LEXAPRO) 10 MG tablet; Take 1 tablet (10 mg) by mouth daily    Needs flu shot  -     [facility]-IMM- FLU VAC  PRESRV FREE QUAD SPLIT VIR > 6 MONTHS IM    Controlled substance agreement signed  -     Drug  Screen Comprehensive , Urine with Reported Meds (MedTox) (Pain Care Package)  -     Discontinue: methylphenidate (RITALIN LA) 40 MG 24 hr capsule; Take 1 capsule (40 mg) by mouth daily  -     Discontinue: methylphenidate (RITALIN LA) 40 MG 24 hr capsule; Take 1 capsule (40 mg) by mouth daily  -     methylphenidate (RITALIN LA) 40 MG 24 hr capsule; Take 1 capsule (40 mg) by mouth daily      Lengthy discussion with regard to her anxiety.  She has good control with Klonopin but discussed the concerns with regard to tolerance and using a controller medicine in addition.  She is okay with resuming the Lexapro.  We talked about trying sertraline but she would prefer to stay with the Lexapro.  She understands that she needs to be cautious with the Klonopin but it does seem to work very well for her and helps her to be extremely functional both at home and at work.  Discussed the potential for withdrawal if abruptly stopped.     query is appropriate.  ToxAssure today.  Renewal on Ritalin LA 40 mg #30×3.    Flu shot today.    A total of 15 minutes was spent with the patient, greater than 50% of the time was spent in counseling/discussion of the aforementioned concerns.     Nicho Peoples MD

## 2019-09-30 NOTE — NURSING NOTE
"Chief Complaint   Patient presents with     Recheck Medication       Initial /88   Pulse 104   Temp 97.8  F (36.6  C) (Tympanic)   Resp 16   Wt 93.4 kg (205 lb 12.8 oz)   SpO2 97%   Breastfeeding? No   BMI 36.46 kg/m   Estimated body mass index is 36.46 kg/m  as calculated from the following:    Height as of 5/7/18: 1.6 m (5' 3\").    Weight as of this encounter: 93.4 kg (205 lb 12.8 oz).  Medication Reconciliation: complete    Leanne Recinos LPN  "

## 2019-09-30 NOTE — NURSING NOTE
Chief Complaint   Patient presents with     Recheck Medication     Immunization Documentation    Prior to Immunization administration, verified patients identity using patient's name and date of birth. Please see IMMUNIZATIONS  and order for additional information.  Patient / Parent instructed to remain in clinic for 15 minutes and report any adverse reaction to staff immediately.    Was entire vial of medication used? Yes  Vial/Syringe: Syringe    Leanne Recinos LPN  9/30/2019   5:09 PM

## 2019-10-05 LAB — PAIN DRUG SCR UR W RPTD MEDS: NORMAL

## 2019-10-31 ENCOUNTER — TELEPHONE (OUTPATIENT)
Dept: FAMILY MEDICINE | Facility: OTHER | Age: 29
End: 2019-10-31

## 2019-10-31 NOTE — TELEPHONE ENCOUNTER
Patient is calling because she is wanting a work in appointment. She did not like how far out he is scheduling. Please advise. Thanks!

## 2019-11-04 ENCOUNTER — TELEPHONE (OUTPATIENT)
Dept: FAMILY MEDICINE | Facility: OTHER | Age: 29
End: 2019-11-04

## 2019-11-05 NOTE — TELEPHONE ENCOUNTER
The patient stated Nicho Peoples MD said he would take her  on as a new patient. Per Nicho Peoples MD the patient's  was given an appointment with him.  Leanne Recinos LPN..................11/5/2019   12:04 PM

## 2019-11-26 ENCOUNTER — OFFICE VISIT (OUTPATIENT)
Dept: FAMILY MEDICINE | Facility: OTHER | Age: 29
End: 2019-11-26
Attending: FAMILY MEDICINE
Payer: COMMERCIAL

## 2019-11-26 VITALS
HEART RATE: 84 BPM | DIASTOLIC BLOOD PRESSURE: 84 MMHG | WEIGHT: 206 LBS | RESPIRATION RATE: 16 BRPM | SYSTOLIC BLOOD PRESSURE: 124 MMHG | BODY MASS INDEX: 36.5 KG/M2 | HEIGHT: 63 IN | OXYGEN SATURATION: 98 % | TEMPERATURE: 97.7 F

## 2019-11-26 DIAGNOSIS — F41.9 ANXIETY: Primary | ICD-10-CM

## 2019-11-26 DIAGNOSIS — F90.9 ATTENTION DEFICIT HYPERACTIVITY DISORDER (ADHD), UNSPECIFIED ADHD TYPE: ICD-10-CM

## 2019-11-26 DIAGNOSIS — Z79.899 CONTROLLED SUBSTANCE AGREEMENT SIGNED: ICD-10-CM

## 2019-11-26 PROCEDURE — 99213 OFFICE O/P EST LOW 20 MIN: CPT | Performed by: FAMILY MEDICINE

## 2019-11-26 RX ORDER — METHYLPHENIDATE HYDROCHLORIDE 40 MG/1
40 CAPSULE, EXTENDED RELEASE ORAL DAILY
Qty: 30 CAPSULE | Refills: 0 | Status: SHIPPED | OUTPATIENT
Start: 2019-11-26 | End: 2020-03-25

## 2019-11-26 RX ORDER — CLONAZEPAM 1 MG/1
TABLET ORAL
Qty: 60 TABLET | Refills: 5 | Status: SHIPPED | OUTPATIENT
Start: 2019-11-26 | End: 2020-03-25

## 2019-11-26 RX ORDER — METHYLPHENIDATE HYDROCHLORIDE 40 MG/1
40 CAPSULE, EXTENDED RELEASE ORAL EVERY MORNING
Qty: 30 CAPSULE | Refills: 0 | Status: SHIPPED | OUTPATIENT
Start: 2019-11-26 | End: 2020-03-25

## 2019-11-26 ASSESSMENT — ANXIETY QUESTIONNAIRES
2. NOT BEING ABLE TO STOP OR CONTROL WORRYING: MORE THAN HALF THE DAYS
3. WORRYING TOO MUCH ABOUT DIFFERENT THINGS: MORE THAN HALF THE DAYS
6. BECOMING EASILY ANNOYED OR IRRITABLE: NOT AT ALL
IF YOU CHECKED OFF ANY PROBLEMS ON THIS QUESTIONNAIRE, HOW DIFFICULT HAVE THESE PROBLEMS MADE IT FOR YOU TO DO YOUR WORK, TAKE CARE OF THINGS AT HOME, OR GET ALONG WITH OTHER PEOPLE: NOT DIFFICULT AT ALL
1. FEELING NERVOUS, ANXIOUS, OR ON EDGE: SEVERAL DAYS
5. BEING SO RESTLESS THAT IT IS HARD TO SIT STILL: SEVERAL DAYS
GAD7 TOTAL SCORE: 8
7. FEELING AFRAID AS IF SOMETHING AWFUL MIGHT HAPPEN: NOT AT ALL

## 2019-11-26 ASSESSMENT — PAIN SCALES - GENERAL: PAINLEVEL: NO PAIN (0)

## 2019-11-26 ASSESSMENT — MIFFLIN-ST. JEOR: SCORE: 1638.54

## 2019-11-26 ASSESSMENT — PATIENT HEALTH QUESTIONNAIRE - PHQ9: 5. POOR APPETITE OR OVEREATING: MORE THAN HALF THE DAYS

## 2019-11-26 NOTE — NURSING NOTE
"Chief Complaint   Patient presents with     Recheck Medication       Initial /84   Pulse 84   Temp 97.7  F (36.5  C) (Temporal)   Resp 16   Ht 1.6 m (5' 3\")   Wt 93.4 kg (206 lb)   SpO2 98%   Breastfeeding No   BMI 36.49 kg/m   Estimated body mass index is 36.49 kg/m  as calculated from the following:    Height as of this encounter: 1.6 m (5' 3\").    Weight as of this encounter: 93.4 kg (206 lb).  Medication Reconciliation: complete    Leanne Recinos LPN  "

## 2019-11-26 NOTE — PROGRESS NOTES
"Nursing Notes:   Leanne Recinos LPN  11/26/2019  4:31 PM  Signed  Chief Complaint   Patient presents with     Recheck Medication       Initial /84   Pulse 84   Temp 97.7  F (36.5  C) (Temporal)   Resp 16   Ht 1.6 m (5' 3\")   Wt 93.4 kg (206 lb)   SpO2 98%   Breastfeeding No   BMI 36.49 kg/m    Estimated body mass index is 36.49 kg/m  as calculated from the following:    Height as of this encounter: 1.6 m (5' 3\").    Weight as of this encounter: 93.4 kg (206 lb).  Medication Reconciliation: complete    Leanne Recinos LPN    SUBJECTIVE:  Tia Sethi  is a 29 year old female who comes in today for follow-up and medication management.  I last saw her in September.  She continues on Ritalin 40 mg LA for ADHD.  ToxAssure was normal on 9/30/2019.  She had stopped Lexapro and we resumed it last visit.  Klonopin that she takes works well for her and helps to keep good function at home and at work.    PHQ-9 SCORE 4/24/2018 5/7/2018 5/16/2018   PHQ-9 Total Score 0 0 0     ZOEY-7 SCORE 4/23/2018 7/2/2019 11/26/2019   Total Score 0 6 8       She feels she is doing well on the current medication regimen.  No specific complaints today.    Past Medical, Family, and Social History reviewed and updated as noted below.   ROS is negative except as noted above       Allergies   Allergen Reactions     Other  [No Clinical Screening - See Comments]      Other reaction(s): Throat Irritation  Carmex, Pine, Icy Hot     [facility] Cleansing Skin      Other reaction(s): Swelling     Menthol      Other reaction(s): Swelling     Pine Tar      Other reaction(s): Runny Nose   ,   Family History   Problem Relation Age of Onset     Cancer Father         Cancer, h/o testicular CA     Heart Disease Maternal Grandmother         Heart Disease,cardiomyopathy     Blood Disease Maternal Grandmother         Blood Disease,blood dyscrasia   ,   Current Outpatient Medications   Medication     clonazePAM (KLONOPIN) 1 MG tablet     " "escitalopram (LEXAPRO) 10 MG tablet     methylphenidate (RITALIN LA) 40 MG 24 hr capsule     methylphenidate (RITALIN LA) 40 MG 24 hr capsule     methylphenidate (RITALIN LA) 40 MG 24 hr capsule     naproxen (NAPROSYN) 500 MG tablet     triamcinolone (KENALOG) 0.1 % external cream     Current Facility-Administered Medications   Medication     medroxyPROGESTERone (DEPO-PROVERA) injection 150 mg   ,   Past Medical History:   Diagnosis Date     Helicobacter pylori infection     No Comments Provided   ,   Patient Active Problem List    Diagnosis Date Noted     Anxiety 02/05/2019     Priority: Medium     Controlled substance agreement signed 2/20/18, 7/2/19 02/20/2018     Priority: Medium     Allergic rhinitis due to pollen 01/30/2018     Priority: Medium     Overview:   Spring and fall       Contact dermatitis and eczema 01/30/2018     Priority: Medium     Overview:   Left ear       Menorrhagia with irregular cycle 01/30/2018     Priority: Medium     Chronic ulcer of sacral region with fat layer exposed (H) 09/26/2017     Priority: Medium     Other granulomatous disorders of the skin and subcutaneous tissue 09/26/2017     Priority: Medium     Mass of perirectal soft tissue 04/12/2017     Priority: Medium     Screening for malignant neoplasm of cervix 03/17/2016     Priority: Medium     Overview:   2016 [facility]  PLAN: PAP TEST 3/2019  ; PAP TEST HISTORY       ADHD 09/24/2010     Priority: Medium     Overview:   Evaluation from [facility] done.     ,   Past Surgical History:   Procedure Laterality Date     exision mass retrorectal and coccyx  06/09/2017    Ori Esp[facility]za MD at [facility]    and   Social History     Tobacco Use     Smoking status: Never Smoker     Smokeless tobacco: Never Used   Substance Use Topics     Alcohol use: Yes     Alcohol/week: 4.2 standard drinks     OBJECTIVE:  /84   Pulse 84   Temp 97.7  F (36.5  C) (Temporal)   Resp 16   Ht 1.6 m (5' 3\")   Wt 93.4 kg (206 lb)   SpO2 " 98%   Breastfeeding No   BMI 36.49 kg/m     EXAM:  Alert and cooperative, broad range of affect with no formal thought disorder.  ASSESSMENT/Plan :    Tia was seen today for recheck medication.    Diagnoses and all orders for this visit:    Anxiety  -     clonazePAM (KLONOPIN) 1 MG tablet; TAKE 1/2 TO 1 TABLET BY MOUTH TWICE DAILY AS NEEDED FOR ANXIETY.    Attention deficit hyperactivity disorder (ADHD), unspecified ADHD type  -     methylphenidate (RITALIN LA) 40 MG 24 hr capsule; Take 1 capsule (40 mg) by mouth daily  -     methylphenidate (RITALIN LA) 40 MG 24 hr capsule; Take 1 capsule (40 mg) by mouth daily  -     methylphenidate (RITALIN LA) 40 MG 24 hr capsule; Take 1 capsule (40 mg) by mouth every morning    Controlled substance agreement signed 2/20/18, 7/2/19  -     methylphenidate (RITALIN LA) 40 MG 24 hr capsule; Take 1 capsule (40 mg) by mouth daily  -     methylphenidate (RITALIN LA) 40 MG 24 hr capsule; Take 1 capsule (40 mg) by mouth daily  -     methylphenidate (RITALIN LA) 40 MG 24 hr capsule; Take 1 capsule (40 mg) by mouth every morning      Continue with Lexapro.  Renewal on Klonopin.   query is appropriate.  Ritalin LA 40 mg #30×3.  Follow-up in 3 months, sooner if needed    A total of 15 minutes was spent with the patient, greater than 50% of the time was spent in counseling/discussion of the aforementioned concerns.     Nicho Peoples MD

## 2019-11-27 ASSESSMENT — ANXIETY QUESTIONNAIRES: GAD7 TOTAL SCORE: 8

## 2020-03-25 ENCOUNTER — OFFICE VISIT (OUTPATIENT)
Dept: FAMILY MEDICINE | Facility: OTHER | Age: 30
End: 2020-03-25
Attending: FAMILY MEDICINE
Payer: COMMERCIAL

## 2020-03-25 VITALS
SYSTOLIC BLOOD PRESSURE: 126 MMHG | WEIGHT: 217 LBS | RESPIRATION RATE: 16 BRPM | OXYGEN SATURATION: 99 % | BODY MASS INDEX: 38.44 KG/M2 | TEMPERATURE: 97.6 F | DIASTOLIC BLOOD PRESSURE: 84 MMHG | HEART RATE: 99 BPM

## 2020-03-25 DIAGNOSIS — Z79.899 CONTROLLED SUBSTANCE AGREEMENT SIGNED: ICD-10-CM

## 2020-03-25 DIAGNOSIS — F41.9 ANXIETY: ICD-10-CM

## 2020-03-25 DIAGNOSIS — F90.9 ATTENTION DEFICIT HYPERACTIVITY DISORDER (ADHD), UNSPECIFIED ADHD TYPE: Primary | ICD-10-CM

## 2020-03-25 PROCEDURE — 99213 OFFICE O/P EST LOW 20 MIN: CPT | Performed by: FAMILY MEDICINE

## 2020-03-25 PROCEDURE — 80307 DRUG TEST PRSMV CHEM ANLYZR: CPT | Mod: ZL | Performed by: FAMILY MEDICINE

## 2020-03-25 RX ORDER — METHYLPHENIDATE HYDROCHLORIDE 40 MG/1
40 CAPSULE, EXTENDED RELEASE ORAL DAILY
Qty: 30 CAPSULE | Refills: 0 | Status: SHIPPED | OUTPATIENT
Start: 2020-05-26 | End: 2020-06-25

## 2020-03-25 RX ORDER — METHYLPHENIDATE HYDROCHLORIDE 40 MG/1
40 CAPSULE, EXTENDED RELEASE ORAL DAILY
Qty: 30 CAPSULE | Refills: 0 | Status: SHIPPED | OUTPATIENT
Start: 2020-03-25 | End: 2020-04-24

## 2020-03-25 RX ORDER — METHYLPHENIDATE HYDROCHLORIDE 40 MG/1
40 CAPSULE, EXTENDED RELEASE ORAL DAILY
Qty: 30 CAPSULE | Refills: 0 | Status: SHIPPED | OUTPATIENT
Start: 2020-04-25 | End: 2020-05-25

## 2020-03-25 RX ORDER — CLONAZEPAM 1 MG/1
TABLET ORAL
Qty: 60 TABLET | Refills: 5 | Status: SHIPPED | OUTPATIENT
Start: 2020-03-25 | End: 2020-07-09

## 2020-03-25 ASSESSMENT — PAIN SCALES - GENERAL: PAINLEVEL: NO PAIN (0)

## 2020-03-25 NOTE — PROGRESS NOTES
"Nursing Notes:   Leanne Recinos LPN  3/25/2020  8:48 AM  Signed  Chief Complaint   Patient presents with     Recheck Medication       Initial /84   Pulse 99   Temp 97.6  F (36.4  C) (Temporal)   Resp 16   Wt 98.4 kg (217 lb)   SpO2 99%   Breastfeeding No   BMI 38.44 kg/m   Estimated body mass index is 38.44 kg/m  as calculated from the following:    Height as of 11/26/19: 1.6 m (5' 3\").    Weight as of this encounter: 98.4 kg (217 lb).  Medication Reconciliation: complete    Leanne Recinos LPN    SUBJECTIVE:  Tia Sethi  is a 29 year old female who comes in for follow up and medication management.  I last saw her in November .  She continues on Ritalin LA 40 mg for ADHD. Last ToxAssure was an September.    She continues on Lexapro for anxiety.  She takes Klonopin as well and that seems to be working well for her.    PHQ 4/24/2018 5/7/2018 5/16/2018   PHQ-9 Total Score 0 0 0   Q9: Thoughts of better off dead/self-harm past 2 weeks Not at all Not at all Not at all     ZOEY-7 SCORE 4/23/2018 7/2/2019 11/26/2019   Total Score 0 6 8           Past Medical, Family, and Social History reviewed and updated as noted below.   ROS is negative except as noted above       Allergies   Allergen Reactions     Other  [No Clinical Screening - See Comments]      Other reaction(s): Throat Irritation  Carmex, Pine, Icy Hot     [facility] Cleansing Skin      Other reaction(s): Swelling     Menthol      Other reaction(s): Swelling     Pine Tar      Other reaction(s): Runny Nose   ,   Family History   Problem Relation Age of Onset     Cancer Father         Cancer, h/o testicular CA     Heart Disease Maternal Grandmother         Heart Disease,cardiomyopathy     Blood Disease Maternal Grandmother         Blood Disease,blood dyscrasia   ,   Current Outpatient Medications   Medication     clonazePAM (KLONOPIN) 1 MG tablet     escitalopram (LEXAPRO) 10 MG tablet     methylphenidate (RITALIN LA) 40 MG 24 hr capsule     " methylphenidate (RITALIN LA) 40 MG 24 hr capsule     methylphenidate (RITALIN LA) 40 MG 24 hr capsule     naproxen (NAPROSYN) 500 MG tablet     triamcinolone (KENALOG) 0.1 % external cream     Current Facility-Administered Medications   Medication     medroxyPROGESTERone (DEPO-PROVERA) injection 150 mg   ,   Past Medical History:   Diagnosis Date     Helicobacter pylori infection     No Comments Provided   ,   Patient Active Problem List    Diagnosis Date Noted     Anxiety 02/05/2019     Priority: Medium     Controlled substance agreement signed 2/20/18, 7/2/19 02/20/2018     Priority: Medium     Allergic rhinitis due to pollen 01/30/2018     Priority: Medium     Overview:   Spring and fall       Contact dermatitis and eczema 01/30/2018     Priority: Medium     Overview:   Left ear       Menorrhagia with irregular cycle 01/30/2018     Priority: Medium     Chronic ulcer of sacral region with fat layer exposed (H) 09/26/2017     Priority: Medium     Other granulomatous disorders of the skin and subcutaneous tissue 09/26/2017     Priority: Medium     Mass of perirectal soft tissue 04/12/2017     Priority: Medium     Screening for malignant neoplasm of cervix 03/17/2016     Priority: Medium     Overview:   2016 [facility]  PLAN: PAP TEST 3/2019  ; PAP TEST HISTORY       ADHD 09/24/2010     Priority: Medium     Overview:   Evaluation from [facility] done.     ,   Past Surgical History:   Procedure Laterality Date     exision mass retrorectal and coccyx  06/09/2017    Ori Espinoza MD at [facility]    and   Social History     Tobacco Use     Smoking status: Never Smoker     Smokeless tobacco: Never Used   Substance Use Topics     Alcohol use: Yes     Alcohol/week: 4.2 standard drinks     OBJECTIVE:  /84   Pulse 99   Temp 97.6  F (36.4  C) (Temporal)   Resp 16   Wt 98.4 kg (217 lb)   SpO2 99%   Breastfeeding No   BMI 38.44 kg/m     EXAM:  Alert and cooperative, no distress.  Affect is broad ranging  and appropriate.  ASSESSMENT/Plan :    Tia was seen today for recheck medication.    Diagnoses and all orders for this visit:    Attention deficit hyperactivity disorder (ADHD), unspecified ADHD type  -     Drug  Screen Comprehensive , Urine with Reported Meds (MedTox) (Pain Care Package)    Controlled substance agreement signed 2/20/18, 7/2/19  -     Drug  Screen Comprehensive , Urine with Reported Meds (MedTox) (Pain Care Package)    Anxiety  -     Drug  Screen Comprehensive , Urine with Reported Meds (MedTox) (Pain Care Package)       query is appropriate.  ToxAssure today.  Renewal on Ritalin 40 mg LA #30×3.  Renewal on Klonopin.  Follow-up in 3 months, sooner if needed    A total of 15 minutes was spent with the patient, greater than 50% of the time was spent in counseling/discussion of the aforementioned concerns.     Nicho Peoples MD

## 2020-03-25 NOTE — NURSING NOTE
"Chief Complaint   Patient presents with     Recheck Medication       Initial /84   Pulse 99   Temp 97.6  F (36.4  C) (Temporal)   Resp 16   Wt 98.4 kg (217 lb)   SpO2 99%   Breastfeeding No   BMI 38.44 kg/m   Estimated body mass index is 38.44 kg/m  as calculated from the following:    Height as of 11/26/19: 1.6 m (5' 3\").    Weight as of this encounter: 98.4 kg (217 lb).  Medication Reconciliation: complete    Leanne Recinos LPN  "
Yes

## 2020-03-28 LAB — PAIN DRUG SCR UR W RPTD MEDS: NORMAL

## 2020-07-09 ENCOUNTER — VIRTUAL VISIT (OUTPATIENT)
Dept: FAMILY MEDICINE | Facility: OTHER | Age: 30
End: 2020-07-09
Attending: FAMILY MEDICINE
Payer: COMMERCIAL

## 2020-07-09 VITALS — BODY MASS INDEX: 35.44 KG/M2 | WEIGHT: 200 LBS | TEMPERATURE: 97.8 F | HEIGHT: 63 IN

## 2020-07-09 DIAGNOSIS — F90.9 ATTENTION DEFICIT HYPERACTIVITY DISORDER (ADHD), UNSPECIFIED ADHD TYPE: Primary | ICD-10-CM

## 2020-07-09 DIAGNOSIS — F41.9 ANXIETY: ICD-10-CM

## 2020-07-09 DIAGNOSIS — Z79.899 CONTROLLED SUBSTANCE AGREEMENT SIGNED: ICD-10-CM

## 2020-07-09 PROCEDURE — 99213 OFFICE O/P EST LOW 20 MIN: CPT | Mod: TEL | Performed by: FAMILY MEDICINE

## 2020-07-09 RX ORDER — METHYLPHENIDATE HYDROCHLORIDE 40 MG/1
40 CAPSULE, EXTENDED RELEASE ORAL DAILY
Qty: 30 CAPSULE | Refills: 0 | Status: SHIPPED | OUTPATIENT
Start: 2020-09-09 | End: 2020-10-09

## 2020-07-09 RX ORDER — METHYLPHENIDATE HYDROCHLORIDE 40 MG/1
40 CAPSULE, EXTENDED RELEASE ORAL DAILY
Qty: 30 CAPSULE | Refills: 0 | Status: SHIPPED | OUTPATIENT
Start: 2020-07-09 | End: 2020-08-08

## 2020-07-09 RX ORDER — METHYLPHENIDATE HYDROCHLORIDE 40 MG/1
40 CAPSULE, EXTENDED RELEASE ORAL DAILY
Qty: 30 CAPSULE | Refills: 0 | Status: CANCELLED | OUTPATIENT
Start: 2020-07-09

## 2020-07-09 RX ORDER — ESCITALOPRAM OXALATE 10 MG/1
15 TABLET ORAL DAILY
Qty: 145 TABLET | Refills: 11 | Status: SHIPPED | OUTPATIENT
Start: 2020-07-09 | End: 2021-02-04

## 2020-07-09 RX ORDER — METHYLPHENIDATE HYDROCHLORIDE 40 MG/1
40 CAPSULE, EXTENDED RELEASE ORAL DAILY
Qty: 30 CAPSULE | Refills: 0 | Status: SHIPPED | OUTPATIENT
Start: 2020-08-09 | End: 2020-09-08

## 2020-07-09 RX ORDER — CLONAZEPAM 1 MG/1
TABLET ORAL
Qty: 60 TABLET | Refills: 5 | Status: SHIPPED | OUTPATIENT
Start: 2020-07-09 | End: 2021-01-06

## 2020-07-09 ASSESSMENT — PAIN SCALES - GENERAL: PAINLEVEL: NO PAIN (0)

## 2020-07-09 ASSESSMENT — PATIENT HEALTH QUESTIONNAIRE - PHQ9: SUM OF ALL RESPONSES TO PHQ QUESTIONS 1-9: 0

## 2020-07-09 ASSESSMENT — MIFFLIN-ST. JEOR: SCORE: 1611.32

## 2020-07-09 NOTE — PROGRESS NOTES
"Tia Sethi is a 29 year old female who is being evaluated via a billable telephone visit.      The patient has been notified of following:     \"This telephone visit will be conducted via a call between you and your physician/provider. We have found that certain health care needs can be provided without the need for a physical exam.  This service lets us provide the care you need with a short phone conversation.  If a prescription is necessary we can send it directly to your pharmacy.  If lab work is needed we can place an order for that and you can then stop by our lab to have the test done at a later time.    Telephone visits are billed at different rates depending on your insurance coverage. During this emergency period, for some insurers they may be billed the same as an in-person visit.  Please reach out to your insurance provider with any questions.    If during the course of the call the physician/provider feels a telephone visit is not appropriate, you will not be charged for this service.\"    Patient has given verbal consent for Telephone visit?  Yes    What phone number would you like to be contacted at? [phone]    How would you like to obtain your AVS? Declined    There are no exam notes on file for this visit.        Subjective     Tia Sethi is a 29 year old female who presents via phone visit today for the following health issues:    FELISHA Weber is having a telephone visit for follow-up and renewal of medications.  She continues on clonazepam along with Lexapro.  Her anxiety has been well controlled.  She continues on Ritalin LA 40 mg daily for ADHD.  I last saw her in person 3 months ago.  She had ToxAssure which was appropriate at that visit.  She is due to renew her medication agreement and will do that at her next visit.    She has been increased stress of late.  They are doing some work on their house.  They are currently staying with her parents.  Her uncle has metastatic " cancer.  Work is been extremely busy.      Patient Active Problem List   Diagnosis     ADHD     Allergic rhinitis due to pollen     Contact dermatitis and eczema     Menorrhagia with irregular cycle     Chronic ulcer of sacral region with fat layer exposed (H)     Mass of perirectal soft tissue     Other granulomatous disorders of the skin and subcutaneous tissue     Screening for malignant neoplasm of cervix     Controlled substance agreement signed 2/20/18, 7/2/19     Anxiety     Past Surgical History:   Procedure Laterality Date     exision mass retrorectal and coccyx  06/09/2017    Ori Espinoza MD at [facility]       Social History     Tobacco Use     Smoking status: Never Smoker     Smokeless tobacco: Never Used   Substance Use Topics     Alcohol use: Yes     Alcohol/week: 4.2 standard drinks     Family History   Problem Relation Age of Onset     Cancer Father         Cancer, h/o testicular CA     Heart Disease Maternal Grandmother         Heart Disease,cardiomyopathy     Blood Disease Maternal Grandmother         Blood Disease,blood dyscrasia         Current Outpatient Medications   Medication Sig Dispense Refill     clonazePAM (KLONOPIN) 1 MG tablet TAKE 1/2 TO 1 TABLET BY MOUTH TWICE DAILY AS NEEDED FOR ANXIETY. 60 tablet 5     escitalopram (LEXAPRO) 10 MG tablet Take 1 tablet (10 mg) by mouth daily 90 tablet 11     naproxen (NAPROSYN) 500 MG tablet Take 500 mg by mouth 2 times daily (with meals)       triamcinolone (KENALOG) 0.1 % external cream Apply topically 3 times daily Apply  topically to affected area(s) 3 times daily. 80 g 3     Allergies   Allergen Reactions     Other  [No Clinical Screening - See Comments]      Other reaction(s): Throat Irritation  Carmex, Pine, Icy Hot     [facility] Cleansing Skin      Other reaction(s): Swelling     Menthol      Other reaction(s): Swelling     Pine Tar      Other reaction(s): Runny Nose       Reviewed and updated as needed this visit by Provider         Review  "of Systems   ROS is negative except as noted above          Objective   Reported vitals:  Temp 97.8  F (36.6  C) (Temporal)   Ht 1.6 m (5' 3\")   Wt 90.7 kg (200 lb)   LMP 06/19/2020 (Exact Date)   Breastfeeding No   BMI 35.43 kg/m     healthy, alert and no distress  PSYCH: Alert and oriented times 3; coherent speech, normal   rate and volume, able to articulate logical thoughts, able   to abstract reason, no tangential thoughts, no hallucinations   or delusions  Her affect is normal  RESP: No cough, no audible wheezing, able to talk in full sentences  Remainder of exam unable to be completed due to telephone visits    Diagnostic Test Results:  Labs reviewed in Epic        Assessment/Plan:  Tia was seen today for recheck medication.    Diagnoses and all orders for this visit:    Attention deficit hyperactivity disorder (ADHD), unspecified ADHD type  -     methylphenidate (RITALIN LA) 40 MG 24 hr capsule; Take 1 capsule (40 mg) by mouth daily  -     methylphenidate (RITALIN LA) 40 MG 24 hr capsule; Take 1 capsule (40 mg) by mouth daily  -     methylphenidate (RITALIN LA) 40 MG 24 hr capsule; Take 1 capsule (40 mg) by mouth daily    Anxiety  -     clonazePAM (KLONOPIN) 1 MG tablet; TAKE 1/2 TO 1 TABLET BY MOUTH TWICE DAILY AS NEEDED FOR ANXIETY.  -     escitalopram (LEXAPRO) 10 MG tablet; Take 1.5 tablets (15 mg) by mouth daily    Controlled substance agreement signed 2/20/18, 7/2/19  -     methylphenidate (RITALIN LA) 40 MG 24 hr capsule; Take 1 capsule (40 mg) by mouth daily  -     methylphenidate (RITALIN LA) 40 MG 24 hr capsule; Take 1 capsule (40 mg) by mouth daily  -     methylphenidate (RITALIN LA) 40 MG 24 hr capsule; Take 1 capsule (40 mg) by mouth daily       Lengthy discussion today with regard to options.  Elected to increase Lexapro to 15 mg daily.  We will leave Klonopin the same and renewed her Ritalin.  We will update her contract at her next visit.    No follow-ups on file.      Phone call " duration:  11 minutes    Nicho Peoples MD

## 2020-10-26 ENCOUNTER — OFFICE VISIT (OUTPATIENT)
Dept: FAMILY MEDICINE | Facility: OTHER | Age: 30
End: 2020-10-26
Attending: FAMILY MEDICINE
Payer: COMMERCIAL

## 2020-10-26 VITALS
TEMPERATURE: 98.1 F | RESPIRATION RATE: 16 BRPM | SYSTOLIC BLOOD PRESSURE: 126 MMHG | BODY MASS INDEX: 40.21 KG/M2 | DIASTOLIC BLOOD PRESSURE: 84 MMHG | WEIGHT: 227 LBS | OXYGEN SATURATION: 98 % | HEART RATE: 98 BPM

## 2020-10-26 DIAGNOSIS — F90.9 ATTENTION DEFICIT HYPERACTIVITY DISORDER (ADHD), UNSPECIFIED ADHD TYPE: Primary | ICD-10-CM

## 2020-10-26 DIAGNOSIS — Z23 NEEDS FLU SHOT: ICD-10-CM

## 2020-10-26 DIAGNOSIS — Z79.899 CONTROLLED SUBSTANCE AGREEMENT SIGNED: ICD-10-CM

## 2020-10-26 DIAGNOSIS — F41.9 ANXIETY: ICD-10-CM

## 2020-10-26 PROCEDURE — 99213 OFFICE O/P EST LOW 20 MIN: CPT | Performed by: FAMILY MEDICINE

## 2020-10-26 PROCEDURE — 80307 DRUG TEST PRSMV CHEM ANLYZR: CPT | Mod: ZL | Performed by: FAMILY MEDICINE

## 2020-10-26 RX ORDER — METHYLPHENIDATE HYDROCHLORIDE 40 MG/1
40 CAPSULE, EXTENDED RELEASE ORAL DAILY
Qty: 30 CAPSULE | Refills: 0 | Status: SHIPPED | OUTPATIENT
Start: 2020-11-26 | End: 2020-12-26

## 2020-10-26 RX ORDER — METHYLPHENIDATE HYDROCHLORIDE 40 MG/1
40 CAPSULE, EXTENDED RELEASE ORAL DAILY
Qty: 30 CAPSULE | Refills: 0 | Status: SHIPPED | OUTPATIENT
Start: 2020-12-27 | End: 2021-01-26

## 2020-10-26 RX ORDER — METHYLPHENIDATE HYDROCHLORIDE 40 MG/1
40 CAPSULE, EXTENDED RELEASE ORAL DAILY
Qty: 30 CAPSULE | Refills: 0 | Status: SHIPPED | OUTPATIENT
Start: 2020-10-26 | End: 2020-11-25

## 2020-10-26 ASSESSMENT — ANXIETY QUESTIONNAIRES
2. NOT BEING ABLE TO STOP OR CONTROL WORRYING: SEVERAL DAYS
3. WORRYING TOO MUCH ABOUT DIFFERENT THINGS: NOT AT ALL
6. BECOMING EASILY ANNOYED OR IRRITABLE: NOT AT ALL
IF YOU CHECKED OFF ANY PROBLEMS ON THIS QUESTIONNAIRE, HOW DIFFICULT HAVE THESE PROBLEMS MADE IT FOR YOU TO DO YOUR WORK, TAKE CARE OF THINGS AT HOME, OR GET ALONG WITH OTHER PEOPLE: NOT DIFFICULT AT ALL
GAD7 TOTAL SCORE: 1
1. FEELING NERVOUS, ANXIOUS, OR ON EDGE: NOT AT ALL
5. BEING SO RESTLESS THAT IT IS HARD TO SIT STILL: NOT AT ALL
7. FEELING AFRAID AS IF SOMETHING AWFUL MIGHT HAPPEN: NOT AT ALL

## 2020-10-26 ASSESSMENT — PATIENT HEALTH QUESTIONNAIRE - PHQ9: 5. POOR APPETITE OR OVEREATING: NOT AT ALL

## 2020-10-26 ASSESSMENT — PAIN SCALES - GENERAL: PAINLEVEL: NO PAIN (0)

## 2020-10-26 NOTE — PROGRESS NOTES
"Nursing Notes:   Leanne Recinos LPN  10/26/2020  4:37 PM  Signed  Chief Complaint   Patient presents with     Recheck Medication       Initial /84   Pulse 98   Temp 98.1  F (36.7  C) (Temporal)   Resp 16   Wt 103 kg (227 lb)   LMP 10/25/2020   SpO2 98%   Breastfeeding No   BMI 40.21 kg/m   Estimated body mass index is 40.21 kg/m  as calculated from the following:    Height as of 7/9/20: 1.6 m (5' 3\").    Weight as of this encounter: 103 kg (227 lb).  Medication Reconciliation: complete    Leanne Recinos LPN      SUBJECTIVE:  Tia Sethi  is a 29 year old female who comes in today for follow-up and medication management. She needs renewal of her medication agreement and is due for ToxAssure.  She continues on Ritalin LA 40 mg daily.  I saw her virtually 3 months ago.   PDMP Review       Value Time User    State PDMP site checked  Yes 10/26/2020  4:05 PM Nicho Peoples MD           She continues on Lexapro 10 mg daily for anxiety and Klonopin 1/2-1 tab twice daily as needed.  She is doing well and feels that she is well controlled.  They are trying to get pregnant 10 we discussed that she would need to come off of her stimulant medication at that time.    PHQ 5/7/2018 5/16/2018 7/9/2020   PHQ-9 Total Score 0 0 0   Q9: Thoughts of better off dead/self-harm past 2 weeks Not at all Not at all Not at all     ZOEY-7 SCORE 7/2/2019 11/26/2019 10/26/2020   Total Score 6 8 1           She is due for flu shot.  She got one last year but declines this year.    Past Medical, Family, and Social History reviewed and updated as noted below.   ROS is negative except as noted above       Allergies   Allergen Reactions     Other  [No Clinical Screening - See Comments]      Other reaction(s): Throat Irritation  Carmex, Pine, Icy Hot     [facility] Cleansing Skin      Other reaction(s): Swelling     Menthol      Other reaction(s): Swelling     Pine Tar      Other reaction(s): Runny Nose   ,   Family History "   Problem Relation Age of Onset     Cancer Father         Cancer, h/o testicular CA     Heart Disease Maternal Grandmother         Heart Disease,cardiomyopathy     Blood Disease Maternal Grandmother         Blood Disease,blood dyscrasia   ,   Current Outpatient Medications   Medication     clonazePAM (KLONOPIN) 1 MG tablet     escitalopram (LEXAPRO) 10 MG tablet     methylphenidate (RITALIN LA) 40 MG 24 hr capsule     [START ON 11/26/2020] methylphenidate (RITALIN LA) 40 MG 24 hr capsule     [START ON 12/27/2020] methylphenidate (RITALIN LA) 40 MG 24 hr capsule     naproxen (NAPROSYN) 500 MG tablet     triamcinolone (KENALOG) 0.1 % external cream     No current facility-administered medications for this visit.    ,   Past Medical History:   Diagnosis Date     Helicobacter pylori infection     No Comments Provided   ,   Patient Active Problem List    Diagnosis Date Noted     Anxiety 02/05/2019     Priority: Medium     Controlled substance agreement signed 2/20/18, 7/2/19, 10/26/2020 02/20/2018     Priority: Medium     Allergic rhinitis due to pollen 01/30/2018     Priority: Medium     Overview:   Spring and fall       Contact dermatitis and eczema 01/30/2018     Priority: Medium     Overview:   Left ear       Menorrhagia with irregular cycle 01/30/2018     Priority: Medium     Chronic ulcer of sacral region with fat layer exposed (H) 09/26/2017     Priority: Medium     Other granulomatous disorders of the skin and subcutaneous tissue 09/26/2017     Priority: Medium     Mass of perirectal soft tissue 04/12/2017     Priority: Medium     Screening for malignant neoplasm of cervix 03/17/2016     Priority: Medium     Overview:   2016 NIL  PLAN: PAP TEST 3/2019  ; PAP TEST HISTORY       ADHD 09/24/2010     Priority: Medium     Overview:   Evaluation from [facility] Counselling done.     ,   Past Surgical History:   Procedure Laterality Date     exision mass retrorectal and coccyx  06/09/2017    Ori Espinoza MD at [facility]ospital    and   Social History     Tobacco Use     Smoking status: Never Smoker     Smokeless tobacco: Never Used   Substance Use Topics     Alcohol use: Yes     Alcohol/week: 4.2 standard drinks     OBJECTIVE:  /84   Pulse 98   Temp 98.1  F (36.7  C) (Temporal)   Resp 16   Wt 103 kg (227 lb)   LMP 10/25/2020   SpO2 98%   Breastfeeding No   BMI 40.21 kg/m     EXAM:  Alert and cooperative, no distress.  Affect is broad ranging and appropriate.  Depression and anxiety inventories as noted above.  ASSESSMENT/Plan :    Tia was seen today for recheck medication.    Diagnoses and all orders for this visit:    Attention deficit hyperactivity disorder (ADHD), unspecified ADHD type  -     Drug  Screen Comprehensive , Urine with Reported Meds (MedTox) (Pain Care Package)  -     methylphenidate (RITALIN LA) 40 MG 24 hr capsule; Take 1 capsule (40 mg) by mouth daily  -     methylphenidate (RITALIN LA) 40 MG 24 hr capsule; Take 1 capsule (40 mg) by mouth daily  -     methylphenidate (RITALIN LA) 40 MG 24 hr capsule; Take 1 capsule (40 mg) by mouth daily    Controlled substance agreement signed 2/20/18, 7/2/19, 10/26/2020  -     Drug  Screen Comprehensive , Urine with Reported Meds (MedTox) (Pain Care Package)  -     methylphenidate (RITALIN LA) 40 MG 24 hr capsule; Take 1 capsule (40 mg) by mouth daily  -     methylphenidate (RITALIN LA) 40 MG 24 hr capsule; Take 1 capsule (40 mg) by mouth daily  -     methylphenidate (RITALIN LA) 40 MG 24 hr capsule; Take 1 capsule (40 mg) by mouth daily    Anxiety  -     Drug  Screen Comprehensive , Urine with Reported Meds (MedTox) (Pain Care Package)    Needs flu shot  -     Cancel: GH-IMM- FLU VAC PRESRV FREE QUAD SPLIT VIR > 6 MONTHS IM        ToxAssure today.   query is appropriate.  Renewal on Ritalin 40 mg LA pursuant to her contract.  Continue other other medications.    A total of 15 minutes was spent with the patient, greater than 50% of the time was spent in  counseling/discussion of the aforementioned concerns.       Nicho Peoples MD

## 2020-10-26 NOTE — NURSING NOTE
"Chief Complaint   Patient presents with     Recheck Medication       Initial /84   Pulse 98   Temp 98.1  F (36.7  C) (Temporal)   Resp 16   Wt 103 kg (227 lb)   LMP 10/25/2020   SpO2 98%   Breastfeeding No   BMI 40.21 kg/m   Estimated body mass index is 40.21 kg/m  as calculated from the following:    Height as of 7/9/20: 1.6 m (5' 3\").    Weight as of this encounter: 103 kg (227 lb).  Medication Reconciliation: complete    Leanne Recinos LPN  "

## 2020-10-27 ASSESSMENT — ANXIETY QUESTIONNAIRES: GAD7 TOTAL SCORE: 1

## 2020-10-30 LAB — PAIN DRUG SCR UR W RPTD MEDS: NORMAL

## 2021-01-05 ENCOUNTER — HOSPITAL ENCOUNTER (OUTPATIENT)
Dept: GENERAL RADIOLOGY | Facility: OTHER | Age: 31
End: 2021-01-05
Attending: FAMILY MEDICINE
Payer: COMMERCIAL

## 2021-01-05 ENCOUNTER — OFFICE VISIT (OUTPATIENT)
Dept: FAMILY MEDICINE | Facility: OTHER | Age: 31
End: 2021-01-05
Attending: FAMILY MEDICINE
Payer: COMMERCIAL

## 2021-01-05 VITALS
WEIGHT: 204 LBS | DIASTOLIC BLOOD PRESSURE: 74 MMHG | OXYGEN SATURATION: 98 % | SYSTOLIC BLOOD PRESSURE: 130 MMHG | BODY MASS INDEX: 36.14 KG/M2 | HEART RATE: 112 BPM | RESPIRATION RATE: 20 BRPM | TEMPERATURE: 98.6 F

## 2021-01-05 DIAGNOSIS — F41.9 ANXIETY: ICD-10-CM

## 2021-01-05 DIAGNOSIS — R11.2 NAUSEA AND VOMITING, INTRACTABILITY OF VOMITING NOT SPECIFIED, UNSPECIFIED VOMITING TYPE: ICD-10-CM

## 2021-01-05 DIAGNOSIS — R05.9 COUGH: ICD-10-CM

## 2021-01-05 DIAGNOSIS — J02.9 SORE THROAT: ICD-10-CM

## 2021-01-05 DIAGNOSIS — R05.9 COUGH: Primary | ICD-10-CM

## 2021-01-05 LAB
ALBUMIN SERPL-MCNC: 4.6 G/DL (ref 3.5–5.7)
ALP SERPL-CCNC: 66 U/L (ref 34–104)
ALT SERPL W P-5'-P-CCNC: 24 U/L (ref 7–52)
ANION GAP SERPL CALCULATED.3IONS-SCNC: 9 MMOL/L (ref 3–14)
AST SERPL W P-5'-P-CCNC: 18 U/L (ref 13–39)
BASOPHILS # BLD AUTO: 0.1 10E9/L (ref 0–0.2)
BASOPHILS NFR BLD AUTO: 0.6 %
BILIRUB SERPL-MCNC: 0.4 MG/DL (ref 0.3–1)
BUN SERPL-MCNC: 10 MG/DL (ref 7–25)
CALCIUM SERPL-MCNC: 9.3 MG/DL (ref 8.6–10.3)
CHLORIDE SERPL-SCNC: 103 MMOL/L (ref 98–107)
CO2 SERPL-SCNC: 23 MMOL/L (ref 21–31)
CREAT SERPL-MCNC: 0.91 MG/DL (ref 0.6–1.2)
DIFFERENTIAL METHOD BLD: ABNORMAL
EOSINOPHIL # BLD AUTO: 0.2 10E9/L (ref 0–0.7)
EOSINOPHIL NFR BLD AUTO: 1.6 %
ERYTHROCYTE [DISTWIDTH] IN BLOOD BY AUTOMATED COUNT: 17.3 % (ref 10–15)
GFR SERPL CREATININE-BSD FRML MDRD: 74 ML/MIN/{1.73_M2}
GLUCOSE SERPL-MCNC: 118 MG/DL (ref 70–105)
HCT VFR BLD AUTO: 39.3 % (ref 35–47)
HETEROPH AB SER QL: NEGATIVE
HGB BLD-MCNC: 12.2 G/DL (ref 11.7–15.7)
IMM GRANULOCYTES # BLD: 0 10E9/L (ref 0–0.4)
IMM GRANULOCYTES NFR BLD: 0.3 %
LYMPHOCYTES # BLD AUTO: 2.4 10E9/L (ref 0.8–5.3)
LYMPHOCYTES NFR BLD AUTO: 20.3 %
MCH RBC QN AUTO: 24.3 PG (ref 26.5–33)
MCHC RBC AUTO-ENTMCNC: 31 G/DL (ref 31.5–36.5)
MCV RBC AUTO: 78 FL (ref 78–100)
MONOCYTES # BLD AUTO: 0.9 10E9/L (ref 0–1.3)
MONOCYTES NFR BLD AUTO: 7.4 %
NEUTROPHILS # BLD AUTO: 8.4 10E9/L (ref 1.6–8.3)
NEUTROPHILS NFR BLD AUTO: 69.8 %
PLATELET # BLD AUTO: 452 10E9/L (ref 150–450)
POTASSIUM SERPL-SCNC: 3.8 MMOL/L (ref 3.5–5.1)
PROT SERPL-MCNC: 8.5 G/DL (ref 6.4–8.9)
RBC # BLD AUTO: 5.03 10E12/L (ref 3.8–5.2)
SODIUM SERPL-SCNC: 135 MMOL/L (ref 134–144)
WBC # BLD AUTO: 12 10E9/L (ref 4–11)

## 2021-01-05 PROCEDURE — 85025 COMPLETE CBC W/AUTO DIFF WBC: CPT | Mod: ZL | Performed by: FAMILY MEDICINE

## 2021-01-05 PROCEDURE — 86308 HETEROPHILE ANTIBODY SCREEN: CPT | Mod: ZL | Performed by: FAMILY MEDICINE

## 2021-01-05 PROCEDURE — U0003 INFECTIOUS AGENT DETECTION BY NUCLEIC ACID (DNA OR RNA); SEVERE ACUTE RESPIRATORY SYNDROME CORONAVIRUS 2 (SARS-COV-2) (CORONAVIRUS DISEASE [COVID-19]), AMPLIFIED PROBE TECHNIQUE, MAKING USE OF HIGH THROUGHPUT TECHNOLOGIES AS DESCRIBED BY CMS-2020-01-R: HCPCS | Mod: ZL | Performed by: FAMILY MEDICINE

## 2021-01-05 PROCEDURE — C9803 HOPD COVID-19 SPEC COLLECT: HCPCS

## 2021-01-05 PROCEDURE — 36415 COLL VENOUS BLD VENIPUNCTURE: CPT | Mod: ZL | Performed by: FAMILY MEDICINE

## 2021-01-05 PROCEDURE — 250N000011 HC RX IP 250 OP 636: Performed by: FAMILY MEDICINE

## 2021-01-05 PROCEDURE — U0005 INFEC AGEN DETEC AMPLI PROBE: HCPCS | Mod: ZL | Performed by: FAMILY MEDICINE

## 2021-01-05 PROCEDURE — 99213 OFFICE O/P EST LOW 20 MIN: CPT | Performed by: FAMILY MEDICINE

## 2021-01-05 PROCEDURE — 80053 COMPREHEN METABOLIC PANEL: CPT | Mod: ZL | Performed by: FAMILY MEDICINE

## 2021-01-05 PROCEDURE — 71046 X-RAY EXAM CHEST 2 VIEWS: CPT

## 2021-01-05 RX ORDER — ONDANSETRON 4 MG/1
4 TABLET, ORALLY DISINTEGRATING ORAL EVERY 8 HOURS PRN
Qty: 30 TABLET | Refills: 1 | Status: SHIPPED | OUTPATIENT
Start: 2021-01-05 | End: 2021-02-04

## 2021-01-05 RX ORDER — AZITHROMYCIN 250 MG/1
TABLET, FILM COATED ORAL
Qty: 6 TABLET | Refills: 0 | Status: SHIPPED | OUTPATIENT
Start: 2021-01-05 | End: 2021-02-04

## 2021-01-05 RX ORDER — ONDANSETRON 4 MG/1
4 TABLET, ORALLY DISINTEGRATING ORAL ONCE
Status: COMPLETED | OUTPATIENT
Start: 2021-01-05 | End: 2021-01-05

## 2021-01-05 RX ADMIN — ONDANSETRON 4 MG: 4 TABLET, ORALLY DISINTEGRATING ORAL at 12:00

## 2021-01-05 ASSESSMENT — ENCOUNTER SYMPTOMS
COUGH: 1
DIARRHEA: 1
SORE THROAT: 0
SHORTNESS OF BREATH: 1
CHILLS: 1
NERVOUS/ANXIOUS: 0
VOMITING: 1
FEVER: 0

## 2021-01-05 ASSESSMENT — PAIN SCALES - GENERAL: PAINLEVEL: MODERATE PAIN (5)

## 2021-01-05 NOTE — NURSING NOTE
Patient presents to clinic with her mother experiencing cough, emesis, fatigue, chills and body aches for past 2 weeks.  Medication Reconciliation: complete    Shannan Aguirre LPN

## 2021-01-05 NOTE — PROGRESS NOTES
SUBJECTIVE:   Nursing Notes:   Shannan Aguirre LPN  1/5/2021 10:47 AM  Sign at exiting of workspace  Patient presents to clinic with her mother experiencing cough, emesis, fatigue, chills and body aches for past 2 weeks.  Medication Reconciliation: complete    Shannan Aguirre LPN        Tia Sethi is a 30 year old female who presents to clinic today for a complaint of cough, post-tussive emesis, fatigue, body aches and chills for the past 2 weeks.  Illness started with headaches on Hal Lorri.  Started then with cough and rigors 2-3 days ago.  She is vomiting from coughing.  Very nauseous.  She has had diarrhea as well.  Has had a productive cough.  No fever.  Has been chilled.  Cannot smell right now.  Normal taste.  Has had bad headaches.  If she vomits, she feels shortn of breath.  Works at [employer].  There have been cases at her work site, but doesn't think she has been exposed to anyone recently.  However, she notes later that there has been a resident at work who has had pneumonia recently.  Traveled [place] to her 's parents in [place].      HPI    I personally reviewed medications/allergies/history listed below:    Patient Active Problem List    Diagnosis Date Noted     Anxiety 02/05/2019     Priority: Medium     Controlled substance agreement signed 2/20/18, 7/2/19, 10/26/2020 02/20/2018     Priority: Medium     Allergic rhinitis due to pollen 01/30/2018     Priority: Medium     Overview:   Spring and fall       Contact dermatitis and eczema 01/30/2018     Priority: Medium     Overview:   Left ear       Menorrhagia with irregular cycle 01/30/2018     Priority: Medium     Chronic ulcer of sacral region with fat layer exposed (H) 09/26/2017     Priority: Medium     Other granulomatous disorders of the skin and subcutaneous tissue 09/26/2017     Priority: Medium     Mass of perirectal soft tissue 04/12/2017     Priority: Medium     Screening for malignant neoplasm of cervix  03/17/2016     Priority: Medium     Overview:   2016 NILM  PLAN: PAP TEST 3/2019  ; PAP TEST HISTORY       ADHD 09/24/2010     Priority: Medium     Overview:   Evaluation from [facility] Counselling done.       Past Medical History:   Diagnosis Date     Helicobacter pylori infection     No Comments Provided      Past Surgical History:   Procedure Laterality Date     exision mass retrorectal and coccyx  06/09/2017    Ori Espinoza MD at [facility]     Family History   Problem Relation Age of Onset     Cancer Father         Cancer, h/o testicular CA     Heart Disease Maternal Grandmother         Heart Disease,cardiomyopathy     Blood Disease Maternal Grandmother         Blood Disease,blood dyscrasia     Social History     Tobacco Use     Smoking status: Never Smoker     Smokeless tobacco: Never Used   Substance Use Topics     Alcohol use: Yes     Alcohol/week: 4.2 standard drinks     Social History     Social History Narrative    Graduated from college, 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  Moved back to [place] in February 2018.  Engaged to be  July 2018.    Dannie Father    Criselda  Mother    Sibs     Joanne 1989    Juan Miguel 1991    Yury 1996.     Current Outpatient Medications   Medication Sig Dispense Refill     azithromycin (ZITHROMAX) 250 MG tablet Two tablets first day, then one tablet daily for four days. 6 tablet 0     clonazePAM (KLONOPIN) 1 MG tablet TAKE 1/2 TO 1 TABLET BY MOUTH TWICE DAILY AS NEEDED FOR ANXIETY. 60 tablet 5     escitalopram (LEXAPRO) 10 MG tablet Take 1.5 tablets (15 mg) by mouth daily 145 tablet 11     methylphenidate (RITALIN LA) 40 MG 24 hr capsule Take 1 capsule (40 mg) by mouth daily 30 capsule 0     naproxen (NAPROSYN) 500 MG tablet Take 500 mg by mouth 2 times daily (with meals)       ondansetron (ZOFRAN-ODT) 4 MG ODT tab Take 1 tablet (4 mg) by mouth every 8 hours as needed for nausea 30 tablet 1     triamcinolone (KENALOG) 0.1 % external cream Apply  topically 3 times daily Apply  topically to affected area(s) 3 times daily. 80 g 3     Allergies   Allergen Reactions     Other  [No Clinical Screening - See Comments]      Other reaction(s): Throat Irritation  Carmex, Pine, Icy Hot     [facility] Cleansing Skin      Other reaction(s): Swelling     Menthol      Other reaction(s): Swelling     Pine Tar      Other reaction(s): Runny Nose       Review of Systems   Constitutional: Positive for chills. Negative for fever.   HENT: Negative for ear pain and sore throat.    Respiratory: Positive for cough and shortness of breath.    Gastrointestinal: Positive for diarrhea and vomiting.   Psychiatric/Behavioral: Negative for mood changes. The patient is not nervous/anxious.         OBJECTIVE:     /74 (BP Location: Right arm, Patient Position: Sitting, Cuff Size: Adult Large)   Pulse 112   Temp 98.6  F (37  C) (Tympanic)   Resp 20   Wt 92.5 kg (204 lb)   LMP  (LMP Unknown)   SpO2 98%   Breastfeeding No   BMI 36.14 kg/m    Body mass index is 36.14 kg/m .  Physical Exam  Constitutional:       General: She is not in acute distress.     Appearance: Normal appearance. She is obese. She is ill-appearing. She is not toxic-appearing.   HENT:      Head: Normocephalic.      Right Ear: Tympanic membrane normal.      Left Ear: Tympanic membrane normal.      Nose: Nose normal. No congestion or rhinorrhea.      Mouth/Throat:      Mouth: Mucous membranes are moist.      Pharynx: Oropharynx is clear. No oropharyngeal exudate or posterior oropharyngeal erythema.   Eyes:      General:         Right eye: No discharge.         Left eye: No discharge.      Extraocular Movements: Extraocular movements intact.      Conjunctiva/sclera: Conjunctivae normal.      Pupils: Pupils are equal, round, and reactive to light.   Neck:      Musculoskeletal: Normal range of motion and neck supple.   Cardiovascular:      Rate and Rhythm: Normal rate and regular rhythm.      Pulses: Normal pulses.       Heart sounds: No murmur.   Pulmonary:      Effort: Pulmonary effort is normal.      Breath sounds: Normal breath sounds. No wheezing, rhonchi or rales.   Musculoskeletal:      Right lower leg: No edema.      Left lower leg: No edema.   Lymphadenopathy:      Cervical: No cervical adenopathy.   Neurological:      Mental Status: She is alert.   Psychiatric:         Mood and Affect: Mood normal.         Behavior: Behavior normal.           PHQ-9 SCORE 5/7/2018 5/16/2018 7/9/2020   PHQ-9 Total Score 0 0 0       PHQ-2 Score:     PHQ-2 ( 1999 Pfizer) 1/5/2021 10/26/2020   Q1: Little interest or pleasure in doing things 0 0   Q2: Feeling down, depressed or hopeless 0 -   PHQ-2 Score 0 -       ZOEY-7 SCORE 7/2/2019 11/26/2019 10/26/2020   Total Score 6 8 1           I personally reviewed results withpatient as listed below:   Diagnostic Test Results:  Results for orders placed or performed during the hospital encounter of 01/05/21   XR Chest 2 Views     Status: None    Narrative    PROCEDURE: XR CHEST 2 VW 1/5/2021 11:23 AM    HISTORY: Cough    COMPARISONS: None.    TECHNIQUE: 2 views.    FINDINGS: Heart and pulmonary vasculature are normal. Lungs are clear  and no pleural effusion is seen.         Impression    IMPRESSION: No acute disease.    FANI JARAMILLO MD   Results for orders placed or performed in visit on 01/05/21   Mononucleosis screen (Heterophile)     Status: None   Result Value Ref Range    Mononucleosis Screen Negative NEG^Negative   CBC with platelets differential     Status: Abnormal   Result Value Ref Range    WBC 12.0 (H) 4.0 - 11.0 10e9/L    RBC Count 5.03 3.8 - 5.2 10e12/L    Hemoglobin 12.2 11.7 - 15.7 g/dL    Hematocrit 39.3 35.0 - 47.0 %    MCV 78 78 - 100 fl    MCH 24.3 (L) 26.5 - 33.0 pg    MCHC 31.0 (L) 31.5 - 36.5 g/dL    RDW 17.3 (H) 10.0 - 15.0 %    Platelet Count 452 (H) 150 - 450 10e9/L    Diff Method Automated Method     % Neutrophils 69.8 %    % Lymphocytes 20.3 %    % Monocytes 7.4 %    %  Eosinophils 1.6 %    % Basophils 0.6 %    % Immature Granulocytes 0.3 %    Absolute Neutrophil 8.4 (H) 1.6 - 8.3 10e9/L    Absolute Lymphocytes 2.4 0.8 - 5.3 10e9/L    Absolute Monocytes 0.9 0.0 - 1.3 10e9/L    Absolute Eosinophils 0.2 0.0 - 0.7 10e9/L    Absolute Basophils 0.1 0.0 - 0.2 10e9/L    Abs Immature Granulocytes 0.0 0 - 0.4 10e9/L   Comprehensive metabolic panel     Status: Abnormal   Result Value Ref Range    Sodium 135 134 - 144 mmol/L    Potassium 3.8 3.5 - 5.1 mmol/L    Chloride 103 98 - 107 mmol/L    Carbon Dioxide 23 21 - 31 mmol/L    Anion Gap 9 3 - 14 mmol/L    Glucose 118 (H) 70 - 105 mg/dL    Urea Nitrogen 10 7 - 25 mg/dL    Creatinine 0.91 0.60 - 1.20 mg/dL    GFR Estimate 74 >60 mL/min/[1.73_m2]    GFR Estimate If Black 89 >60 mL/min/[1.73_m2]    Calcium 9.3 8.6 - 10.3 mg/dL    Bilirubin Total 0.4 0.3 - 1.0 mg/dL    Albumin 4.6 3.5 - 5.7 g/dL    Protein Total 8.5 6.4 - 8.9 g/dL    Alkaline Phosphatase 66 34 - 104 U/L    ALT 24 7 - 52 U/L    AST 18 13 - 39 U/L        ASSESSMENT/PLAN:       ICD-10-CM    1. Cough  R05 Symptomatic COVID-19 Virus (Coronavirus) by PCR     Comprehensive metabolic panel     CBC with platelets differential     XR Chest 2 Views     CBC with platelets differential     Comprehensive metabolic panel     Symptomatic COVID-19 Virus (Coronavirus) by PCR     azithromycin (ZITHROMAX) 250 MG tablet   2. Sore throat  J02.9 Mononucleosis screen (Heterophile)     Mononucleosis screen (Heterophile)   3. Nausea and vomiting, intractability of vomiting not specified, unspecified vomiting type  R11.2 ondansetron (ZOFRAN-ODT) 4 MG ODT tab     ondansetron (ZOFRAN-ODT) ODT tab 4 mg       1.  Discussed that likely her cough and other symptoms are due to virus.  Testing for covid-19 was completed.  She does have an elevated white count, but other labs were reassuring.  Discussed her chest x-ray is clear without pneumonia.  Given the length of time she has had her cough for over 10 days,  reasonable to try a round of zithromax to see if this would help at all.  Discussed however that if her illness is due to virus, this is unlikely to help.  Discussed follow up if difficulty breathing, dehydration or other concerning symptoms arise.  2.  Monospot negative as above.  Does not look bacterial.  Likely viral as above.  3.  Nausea/vomiting & diarrhea also likely related to viral illness.  Dose of zofran given prior to leaving clinic and also prescription sent to pharmacy.  Discussed pushing fluids.  May use imodium for diarrhea as well.  As noted, discussed symptoms of dehydration and reasons to follow up.    Rebeca Waldron MD  [facility]    Portions of this dictation were created using the Dragon Nuance voice recognition system. Proofreading was completed but there may be errors in text.

## 2021-01-06 LAB
SARS-COV-2 RNA RESP QL NAA+PROBE: NOT DETECTED
SPECIMEN SOURCE: NORMAL

## 2021-01-06 RX ORDER — CLONAZEPAM 1 MG/1
TABLET ORAL
Qty: 60 TABLET | Refills: 5 | Status: SHIPPED | OUTPATIENT
Start: 2021-01-06 | End: 2021-02-04

## 2021-01-06 NOTE — TELEPHONE ENCOUNTER
Disp Refills Start End STACEY   clonazePAM (KLONOPIN) 1 MG tablet 60 tablet 5 7/9/2020  No   Sig: TAKE 1/2 TO 1 TABLET BY MOUTH TWICE DAILY AS NEEDED FOR ANXIETY.       LOV: 10/26/2020  Future Office visit: No future appointment scheduled at this time.      Routing refill request to provider for review/approval because:  Drug not on the [facility], [facility] or [facility] refill protocol or controlled substance    Requested Prescriptions   Pending Prescriptions Disp Refills     clonazePAM (KLONOPIN) 1 MG tablet [Pharmacy Med Name: CLONAZEPAM 1MG TABLETS] 60 tablet      Sig: TAKE 1/2 TO 1 TABLET BY MOUTH TWICE DAILY AS NEEDED FOR ANXIETY       There is no refill protocol information for this order      Unable to complete prescription refill per RN Medication Refill Policy.................... Lucy Mcdonald RN ....................  1/6/2021   2:25 PM

## 2021-01-15 ENCOUNTER — HEALTH MAINTENANCE LETTER (OUTPATIENT)
Age: 31
End: 2021-01-15

## 2021-01-21 ENCOUNTER — IMMUNIZATION (OUTPATIENT)
Dept: FAMILY MEDICINE | Facility: OTHER | Age: 31
End: 2021-01-21
Payer: COMMERCIAL

## 2021-01-21 PROCEDURE — 91300 PR COVID VAC PFIZER DIL RECON 30 MCG/0.3 ML IM: CPT

## 2021-01-21 PROCEDURE — 0001A PR COVID VAC PFIZER DIL RECON 30 MCG/0.3 ML IM: CPT

## 2021-02-04 ENCOUNTER — VIRTUAL VISIT (OUTPATIENT)
Dept: FAMILY MEDICINE | Facility: OTHER | Age: 31
End: 2021-02-04
Attending: FAMILY MEDICINE
Payer: COMMERCIAL

## 2021-02-04 VITALS — BODY MASS INDEX: 37.21 KG/M2 | WEIGHT: 210 LBS | HEIGHT: 63 IN

## 2021-02-04 DIAGNOSIS — F90.9 ATTENTION DEFICIT HYPERACTIVITY DISORDER (ADHD), UNSPECIFIED ADHD TYPE: ICD-10-CM

## 2021-02-04 DIAGNOSIS — F41.1 GENERALIZED ANXIETY DISORDER: ICD-10-CM

## 2021-02-04 DIAGNOSIS — Z79.899 CONTROLLED SUBSTANCE AGREEMENT SIGNED: ICD-10-CM

## 2021-02-04 DIAGNOSIS — R11.2 NAUSEA AND VOMITING, INTRACTABILITY OF VOMITING NOT SPECIFIED, UNSPECIFIED VOMITING TYPE: ICD-10-CM

## 2021-02-04 PROCEDURE — 99213 OFFICE O/P EST LOW 20 MIN: CPT | Mod: TEL | Performed by: FAMILY MEDICINE

## 2021-02-04 RX ORDER — METHYLPHENIDATE HYDROCHLORIDE 40 MG/1
40 CAPSULE, EXTENDED RELEASE ORAL DAILY
Qty: 30 CAPSULE | Refills: 0 | Status: SHIPPED | OUTPATIENT
Start: 2021-03-07 | End: 2021-04-06

## 2021-02-04 RX ORDER — QUETIAPINE FUMARATE 25 MG/1
25 TABLET, FILM COATED ORAL AT BEDTIME
Qty: 30 TABLET | Refills: 11 | Status: SHIPPED | OUTPATIENT
Start: 2021-02-04 | End: 2021-12-07

## 2021-02-04 RX ORDER — CLONAZEPAM 1 MG/1
TABLET ORAL
Qty: 60 TABLET | Refills: 5 | Status: SHIPPED | OUTPATIENT
Start: 2021-02-04 | End: 2021-05-10

## 2021-02-04 RX ORDER — ONDANSETRON 4 MG/1
4 TABLET, ORALLY DISINTEGRATING ORAL EVERY 8 HOURS PRN
Qty: 30 TABLET | Refills: 1 | Status: SHIPPED | OUTPATIENT
Start: 2021-02-04 | End: 2022-03-16

## 2021-02-04 RX ORDER — METHYLPHENIDATE HYDROCHLORIDE 40 MG/1
40 CAPSULE, EXTENDED RELEASE ORAL DAILY
Qty: 30 CAPSULE | Refills: 0 | Status: SHIPPED | OUTPATIENT
Start: 2021-02-04 | End: 2021-03-06

## 2021-02-04 RX ORDER — METHYLPHENIDATE HYDROCHLORIDE 40 MG/1
40 CAPSULE, EXTENDED RELEASE ORAL DAILY
Qty: 30 CAPSULE | Refills: 0 | Status: CANCELLED | OUTPATIENT
Start: 2021-02-04

## 2021-02-04 RX ORDER — METHYLPHENIDATE HYDROCHLORIDE 40 MG/1
40 CAPSULE, EXTENDED RELEASE ORAL DAILY
Qty: 30 CAPSULE | Refills: 0 | Status: SHIPPED | OUTPATIENT
Start: 2021-04-07 | End: 2021-05-07

## 2021-02-04 RX ORDER — ESCITALOPRAM OXALATE 20 MG/1
20 TABLET ORAL DAILY
Qty: 30 TABLET | Refills: 11 | Status: SHIPPED | OUTPATIENT
Start: 2021-02-04 | End: 2023-03-08

## 2021-02-04 ASSESSMENT — MIFFLIN-ST. JEOR: SCORE: 1651.68

## 2021-02-04 ASSESSMENT — ANXIETY QUESTIONNAIRES
GAD7 TOTAL SCORE: 3
7. FEELING AFRAID AS IF SOMETHING AWFUL MIGHT HAPPEN: NOT AT ALL
5. BEING SO RESTLESS THAT IT IS HARD TO SIT STILL: NOT AT ALL
6. BECOMING EASILY ANNOYED OR IRRITABLE: NOT AT ALL
GAD7 TOTAL SCORE: 3
2. NOT BEING ABLE TO STOP OR CONTROL WORRYING: SEVERAL DAYS
7. FEELING AFRAID AS IF SOMETHING AWFUL MIGHT HAPPEN: NOT AT ALL
GAD7 TOTAL SCORE: 3
1. FEELING NERVOUS, ANXIOUS, OR ON EDGE: SEVERAL DAYS
3. WORRYING TOO MUCH ABOUT DIFFERENT THINGS: NOT AT ALL
4. TROUBLE RELAXING: SEVERAL DAYS

## 2021-02-04 ASSESSMENT — PATIENT HEALTH QUESTIONNAIRE - PHQ9
10. IF YOU CHECKED OFF ANY PROBLEMS, HOW DIFFICULT HAVE THESE PROBLEMS MADE IT FOR YOU TO DO YOUR WORK, TAKE CARE OF THINGS AT HOME, OR GET ALONG WITH OTHER PEOPLE: NOT DIFFICULT AT ALL
SUM OF ALL RESPONSES TO PHQ QUESTIONS 1-9: 6
SUM OF ALL RESPONSES TO PHQ QUESTIONS 1-9: 6

## 2021-02-04 ASSESSMENT — PAIN SCALES - GENERAL: PAINLEVEL: NO PAIN (0)

## 2021-02-04 NOTE — NURSING NOTE
"Patient presents for a televisit for a medication check.  She would like to discuss how the lexapro and clonazepam is affecting her.  Rosa William LPN 2/4/2021   3:06 PM    Chief Complaint   Patient presents with     Recheck Medication       Initial Ht 1.6 m (5' 3\")   Wt 95.3 kg (210 lb)   LMP 01/10/2021 (Within Days)   BMI 37.20 kg/m   Estimated body mass index is 37.2 kg/m  as calculated from the following:    Height as of this encounter: 1.6 m (5' 3\").    Weight as of this encounter: 95.3 kg (210 lb).  Medication Reconciliation: complete  Rosa William LPN    "

## 2021-02-04 NOTE — PROGRESS NOTES
"Answers for HPI/ROS submitted by the patient on 2/4/2021   If you checked off any problems, how difficult have these problems made it for you to do your work, take care of things at home, or get along with other people?: Not difficult at all  PHQ9 TOTAL SCORE: 6  ZOEY 7 TOTAL SCORE: 3  Nursing Notes:   Rosa William LPN  2/4/2021  3:06 PM  Signed  Patient presents for a televisit for a medication check.  She would like to discuss how the lexapro and clonazepam is affecting her.  Rosa William LPN 2/4/2021   3:06 PM    Chief Complaint   Patient presents with     Recheck Medication       Initial Ht 1.6 m (5' 3\")   Wt 95.3 kg (210 lb)   LMP 01/10/2021 (Within Days)   BMI 37.20 kg/m   Estimated body mass index is 37.2 kg/m  as calculated from the following:    Height as of this encounter: 1.6 m (5' 3\").    Weight as of this encounter: 95.3 kg (210 lb).  Medication Reconciliation: complete  Rosa William LPN      Tia is a 30 year old who is being evaluated via a billable telephone visit.      What phone number would you like to be contacted at? [phone]  How would you like to obtain your AVS? Meredithhasmukh Weber is a 30 year old who presents for telephone visit for medication follow-up.  I last saw her in October.  She continues on Ritalin LA 40 mg daily for ADHD.  We renewed her medication agreement and did her ToxAssure at that time.  Those were appropriate.   PDMP Review       Value Time User    State PDMP site checked  Yes 2/4/2021  3:26 PM Nicho Peoples MD           She also has anxiety disorder for which she has been taking Lexapro and clonazepam.  She is on 10 mg of Lexapro and 1/2 to 1 mg of Klonopin twice daily as needed.  As of her last visit, she felt she was doing well and is well controlled.  They have been trying to get pregnant and we discussed coming off of her medication at that time.  Since that time, she has been not doing as well.  She has ongoing nausea " and is vomiting during the week, but not on the weekend.  She will throw up nearly every day during the week.  She has been taking Zofran.  It helps at times.  She is less nauseated if she sleeps better.     PHQ 5/16/2018 7/9/2020 2/4/2021   PHQ-9 Total Score 0 0 6   Q9: Thoughts of better off dead/self-harm past 2 weeks Not at all Not at all Not at all     ZOEY-7 SCORE 11/26/2019 10/26/2020 2/4/2021   Total Score - - 3 (minimal anxiety)   Total Score 8 1 3       She got her first Covid shot on January 21.  She had no significant untoward effects    HPI           Review of Systems   ROS is negative except as noted above         Objective    Vitals - Patient Reported  Pain Score: No Pain (0)        Physical Exam   healthy, alert and no distress  PSYCH: Alert and oriented times 3; coherent speech, normal   rate and volume, able to articulate logical thoughts, able   to abstract reason, no tangential thoughts, no hallucinations   or delusions  Her affect is normal  RESP: No cough, no audible wheezing, able to talk in full sentences  Remainder of exam unable to be completed due to telephone visits        Tia was seen today for recheck medication.    Diagnoses and all orders for this visit:    Generalized anxiety disorder  -     escitalopram (LEXAPRO) 20 MG tablet; Take 1 tablet (20 mg) by mouth daily  -     clonazePAM (KLONOPIN) 1 MG tablet; TAKE 1/2 TO 1 TABLET BY MOUTH TWICE DAILY AS NEEDED FOR ANXIETY  -     QUEtiapine (SEROQUEL) 25 MG tablet; Take 1 tablet (25 mg) by mouth At Bedtime    Attention deficit hyperactivity disorder (ADHD), unspecified ADHD type  -     methylphenidate (RITALIN LA) 40 MG 24 hr capsule; Take 1 capsule (40 mg) by mouth daily  -     methylphenidate (RITALIN LA) 40 MG 24 hr capsule; Take 1 capsule (40 mg) by mouth daily  -     methylphenidate (RITALIN LA) 40 MG 24 hr capsule; Take 1 capsule (40 mg) by mouth daily    Controlled substance agreement signed 2/20/18, 7/2/19, 10/26/2020  -      methylphenidate (RITALIN LA) 40 MG 24 hr capsule; Take 1 capsule (40 mg) by mouth daily  -     methylphenidate (RITALIN LA) 40 MG 24 hr capsule; Take 1 capsule (40 mg) by mouth daily  -     methylphenidate (RITALIN LA) 40 MG 24 hr capsule; Take 1 capsule (40 mg) by mouth daily    Nausea and vomiting, intractability of vomiting not specified, unspecified vomiting type  -     ondansetron (ZOFRAN-ODT) 4 MG ODT tab; Take 1 tablet (4 mg) by mouth every 8 hours as needed for nausea       Certainly I think her anxiety seems to be poorly controlled at this time.  We will add Seroquel 25 mg at bedtime to see if this will help with her sleep and mitigate her nausea to a certain extent.  She has been on hydroxyzine in the past and had no benefit so we did not resume that.  We will also increase Lexapro to 20 mg daily.  She can continue taking the clonazepam as she has been and use the Zofran on a as needed basis.  I asked her to contact us in a month to let us know how she is doing.  We also offered psychiatry referral and she would be okay with that if these adjustments do not seem to make a difference.    Renewed Ritalin 40 mg LA pursuant to her contract.  Follow-up in 3 months for this as well.        Phone call duration: 22 minutes     Nicho Peoples MD

## 2021-02-05 ASSESSMENT — ANXIETY QUESTIONNAIRES: GAD7 TOTAL SCORE: 3

## 2021-02-05 ASSESSMENT — PATIENT HEALTH QUESTIONNAIRE - PHQ9: SUM OF ALL RESPONSES TO PHQ QUESTIONS 1-9: 6

## 2021-02-11 ENCOUNTER — IMMUNIZATION (OUTPATIENT)
Dept: FAMILY MEDICINE | Facility: OTHER | Age: 31
End: 2021-02-11
Attending: FAMILY MEDICINE
Payer: COMMERCIAL

## 2021-02-11 PROCEDURE — 0002A PR COVID VAC PFIZER DIL RECON 30 MCG/0.3 ML IM: CPT

## 2021-02-11 PROCEDURE — 91300 PR COVID VAC PFIZER DIL RECON 30 MCG/0.3 ML IM: CPT

## 2021-05-10 ENCOUNTER — OFFICE VISIT (OUTPATIENT)
Dept: FAMILY MEDICINE | Facility: OTHER | Age: 31
End: 2021-05-10
Attending: FAMILY MEDICINE
Payer: COMMERCIAL

## 2021-05-10 VITALS
RESPIRATION RATE: 16 BRPM | WEIGHT: 215.8 LBS | OXYGEN SATURATION: 99 % | TEMPERATURE: 98 F | HEART RATE: 85 BPM | BODY MASS INDEX: 38.23 KG/M2 | SYSTOLIC BLOOD PRESSURE: 126 MMHG | DIASTOLIC BLOOD PRESSURE: 88 MMHG

## 2021-05-10 DIAGNOSIS — Z79.899 CONTROLLED SUBSTANCE AGREEMENT SIGNED: ICD-10-CM

## 2021-05-10 DIAGNOSIS — F41.1 GENERALIZED ANXIETY DISORDER: ICD-10-CM

## 2021-05-10 DIAGNOSIS — F90.9 ATTENTION DEFICIT HYPERACTIVITY DISORDER (ADHD), UNSPECIFIED ADHD TYPE: Primary | ICD-10-CM

## 2021-05-10 DIAGNOSIS — F41.9 ANXIETY: ICD-10-CM

## 2021-05-10 PROCEDURE — 99213 OFFICE O/P EST LOW 20 MIN: CPT | Performed by: FAMILY MEDICINE

## 2021-05-10 PROCEDURE — 80307 DRUG TEST PRSMV CHEM ANLYZR: CPT | Mod: ZL | Performed by: FAMILY MEDICINE

## 2021-05-10 RX ORDER — METHYLPHENIDATE HYDROCHLORIDE 40 MG/1
40 CAPSULE, EXTENDED RELEASE ORAL DAILY
Qty: 30 CAPSULE | Refills: 0 | Status: SHIPPED | OUTPATIENT
Start: 2021-07-11 | End: 2021-08-10

## 2021-05-10 RX ORDER — CLONAZEPAM 1 MG/1
TABLET ORAL
Qty: 60 TABLET | Refills: 5 | Status: SHIPPED | OUTPATIENT
Start: 2021-05-10 | End: 2021-11-10

## 2021-05-10 RX ORDER — METHYLPHENIDATE HYDROCHLORIDE 40 MG/1
40 CAPSULE, EXTENDED RELEASE ORAL DAILY
Qty: 30 CAPSULE | Refills: 0 | Status: SHIPPED | OUTPATIENT
Start: 2021-06-10 | End: 2021-07-10

## 2021-05-10 RX ORDER — METHYLPHENIDATE HYDROCHLORIDE 40 MG/1
40 CAPSULE, EXTENDED RELEASE ORAL DAILY
Qty: 30 CAPSULE | Refills: 0 | Status: SHIPPED | OUTPATIENT
Start: 2021-05-10 | End: 2021-06-09

## 2021-05-10 ASSESSMENT — ANXIETY QUESTIONNAIRES
2. NOT BEING ABLE TO STOP OR CONTROL WORRYING: NOT AT ALL
5. BEING SO RESTLESS THAT IT IS HARD TO SIT STILL: NOT AT ALL
IF YOU CHECKED OFF ANY PROBLEMS ON THIS QUESTIONNAIRE, HOW DIFFICULT HAVE THESE PROBLEMS MADE IT FOR YOU TO DO YOUR WORK, TAKE CARE OF THINGS AT HOME, OR GET ALONG WITH OTHER PEOPLE: NOT DIFFICULT AT ALL
1. FEELING NERVOUS, ANXIOUS, OR ON EDGE: NOT AT ALL
3. WORRYING TOO MUCH ABOUT DIFFERENT THINGS: NOT AT ALL
GAD7 TOTAL SCORE: 0
6. BECOMING EASILY ANNOYED OR IRRITABLE: NOT AT ALL
7. FEELING AFRAID AS IF SOMETHING AWFUL MIGHT HAPPEN: NOT AT ALL

## 2021-05-10 ASSESSMENT — PAIN SCALES - GENERAL: PAINLEVEL: NO PAIN (0)

## 2021-05-10 ASSESSMENT — PATIENT HEALTH QUESTIONNAIRE - PHQ9: 5. POOR APPETITE OR OVEREATING: NOT AT ALL

## 2021-05-10 NOTE — NURSING NOTE
"Chief Complaint   Patient presents with     Recheck Medication       Initial /88   Pulse 85   Temp 98  F (36.7  C)   Resp 16   Wt 97.9 kg (215 lb 12.8 oz)   LMP 04/29/2021   SpO2 99%   Breastfeeding No   BMI 38.23 kg/m   Estimated body mass index is 38.23 kg/m  as calculated from the following:    Height as of 2/4/21: 1.6 m (5' 3\").    Weight as of this encounter: 97.9 kg (215 lb 12.8 oz).  Medication Reconciliation: complete    Leanne Recinos LPN  "

## 2021-05-10 NOTE — PROGRESS NOTES
"Nursing Notes:   Leanne Recinos LPN  5/10/2021  4:39 PM  Signed  Chief Complaint   Patient presents with     Recheck Medication       Initial /88   Pulse 85   Temp 98  F (36.7  C)   Resp 16   Wt 97.9 kg (215 lb 12.8 oz)   LMP 04/29/2021   SpO2 99%   Breastfeeding No   BMI 38.23 kg/m   Estimated body mass index is 38.23 kg/m  as calculated from the following:    Height as of 2/4/21: 1.6 m (5' 3\").    Weight as of this encounter: 97.9 kg (215 lb 12.8 oz).  Medication Reconciliation: complete    Leanne Recinos LPN      SUBJECTIVE:  Tia Sethi  is a 30 year old female who comes in for follow-up of her medication.  I saw her virtually 3 months ago.  She continues on Ritalin LA 40 mg daily for ADHD.  At her last visit, her anxiety was poorly controlled and we added Seroquel 25 mg at bedtime.  We increased Lexapro to 20 mg daily and continued clonazepam as she has been using.  We discussed psychiatry referral but elected to hold off at that time.    Since that time, she is sleeping much better and anxiety has improved significantly.  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  She is happy about that and congratulations are offered.    PHQ 5/16/2018 7/9/2020 2/4/2021   PHQ-9 Total Score 0 0 6   Q9: Thoughts of better off dead/self-harm past 2 weeks Not at all Not at all Not at all     ZOEY-7 SCORE 10/26/2020 2/4/2021 5/10/2021   Total Score - 3 (minimal anxiety) -   Total Score 1 3 0               Past Medical, Family, and Social History reviewed and updated as noted below.   ROS is negative except as noted above       Allergies   Allergen Reactions     Other  [No Clinical Screening - See Comments]      Other reaction(s): Throat Irritation  Carmex, Pine, Icy Hot     [facility] Cleansing Skin      Other reaction(s): Swelling     Menthol      Other reaction(s): Swelling     Pine Tar      Other " reaction(s): Runny Nose   ,   Family History   Problem Relation Age of Onset     Cancer Father         Cancer, h/o testicular CA     Heart Disease Maternal Grandmother         Heart Disease,cardiomyopathy     Blood Disease Maternal Grandmother         Blood Disease,blood dyscrasia   ,   Current Outpatient Medications   Medication     clonazePAM (KLONOPIN) 1 MG tablet     escitalopram (LEXAPRO) 20 MG tablet     methylphenidate (RITALIN LA) 40 MG 24 hr capsule     [START ON 6/10/2021] methylphenidate (RITALIN LA) 40 MG 24 hr capsule     [START ON 7/11/2021] methylphenidate (RITALIN LA) 40 MG 24 hr capsule     naproxen (NAPROSYN) 500 MG tablet     ondansetron (ZOFRAN-ODT) 4 MG ODT tab     QUEtiapine (SEROQUEL) 25 MG tablet     triamcinolone (KENALOG) 0.1 % external cream     No current facility-administered medications for this visit.    ,   Past Medical History:   Diagnosis Date     Helicobacter pylori infection     No Comments Provided   ,   Patient Active Problem List    Diagnosis Date Noted     Anxiety 02/05/2019     Priority: Medium     Controlled substance agreement signed 2/20/18, 7/2/19, 10/26/2020 02/20/2018     Priority: Medium     Allergic rhinitis due to pollen 01/30/2018     Priority: Medium     Overview:   Spring and fall       Contact dermatitis and eczema 01/30/2018     Priority: Medium     Overview:   Left ear       Menorrhagia with irregular cycle 01/30/2018     Priority: Medium     Chronic ulcer of sacral region with fat layer exposed (H) 09/26/2017     Priority: Medium     Other granulomatous disorders of the skin and subcutaneous tissue 09/26/2017     Priority: Medium     Mass of perirectal soft tissue 04/12/2017     Priority: Medium     Screening for malignant neoplasm of cervix 03/17/2016     Priority: Medium     Overview:   2016 NILM  PLAN: PAP TEST 3/2019  ; PAP TEST HISTORY       ADHD 09/24/2010     Priority: Medium     Overview:   Evaluation from [facility] done.     ,   Past  Surgical History:   Procedure Laterality Date     exision mass retrorectal and coccyx  06/09/2017    Ori Espinoza MD at [facility]    and   Social History     Tobacco Use     Smoking status: Never Smoker     Smokeless tobacco: Never Used   Substance Use Topics     Alcohol use: Yes     Alcohol/week: 4.2 standard drinks     Comment: Occasionally     OBJECTIVE:  /88   Pulse 85   Temp 98  F (36.7  C)   Resp 16   Wt 97.9 kg (215 lb 12.8 oz)   LMP 04/29/2021   SpO2 99%   Breastfeeding No   BMI 38.23 kg/m     EXAM:  Alert cooperative, no distress.  She feels much more calm and relaxed and appears so today.  Her depression and anxiety inventories as noted above.  ASSESSMENT/Plan :    Tia was seen today for recheck medication.    Diagnoses and all orders for this visit:    Attention deficit hyperactivity disorder (ADHD), unspecified ADHD type  -     Drug Confirmation Panel Urine with Creat  -     methylphenidate (RITALIN LA) 40 MG 24 hr capsule; Take 1 capsule (40 mg) by mouth daily  -     methylphenidate (RITALIN LA) 40 MG 24 hr capsule; Take 1 capsule (40 mg) by mouth daily  -     methylphenidate (RITALIN LA) 40 MG 24 hr capsule; Take 1 capsule (40 mg) by mouth daily    Anxiety  -     Drug Confirmation Panel Urine with Creat    Controlled substance agreement signed 2/20/18, 7/2/19, 10/26/2020  -     Drug Confirmation Panel Urine with Creat  -     methylphenidate (RITALIN LA) 40 MG 24 hr capsule; Take 1 capsule (40 mg) by mouth daily  -     methylphenidate (RITALIN LA) 40 MG 24 hr capsule; Take 1 capsule (40 mg) by mouth daily  -     methylphenidate (RITALIN LA) 40 MG 24 hr capsule; Take 1 capsule (40 mg) by mouth daily    Generalized anxiety disorder  -     clonazePAM (KLONOPIN) 1 MG tablet; TAKE 1/2 TO 1 TABLET BY MOUTH TWICE DAILY AS NEEDED FOR ANXIETY      ToxAssure today.  Medications renewed pursuant to her contract. A total of 20 minutes was spent with the patient, reviewing records, tests,  ordering medications, tests or procedures and documenting clinical information in the EHR.  Follow-up in 3 months, sooner if needed    Nicho Peoples MD

## 2021-05-11 ASSESSMENT — ANXIETY QUESTIONNAIRES: GAD7 TOTAL SCORE: 0

## 2021-05-13 LAB
7AMINOCLONAZEPAM UR QL CFM: PRESENT
CREAT UR-MCNC: 270 MG/DL
ME-PHENIDATE UR CFM-MCNC: 860 NG/ML
ME-PHENIDATE UR CFM-MCNC: ABNORMAL NG/ML
ME-PHENIDATE/CREAT UR: 319 NG/MG{CREAT}
ME-PHENIDATE/CREAT UR: ABNORMAL NG/MG{CREAT}
RPT COMMENT: ABNORMAL

## 2021-09-02 ENCOUNTER — VIRTUAL VISIT (OUTPATIENT)
Dept: FAMILY MEDICINE | Facility: OTHER | Age: 31
End: 2021-09-02
Attending: FAMILY MEDICINE
Payer: COMMERCIAL

## 2021-09-02 VITALS — WEIGHT: 210 LBS | BODY MASS INDEX: 37.2 KG/M2

## 2021-09-02 DIAGNOSIS — Z79.899 CONTROLLED SUBSTANCE AGREEMENT SIGNED: ICD-10-CM

## 2021-09-02 DIAGNOSIS — F90.9 ATTENTION DEFICIT HYPERACTIVITY DISORDER (ADHD), UNSPECIFIED ADHD TYPE: Primary | ICD-10-CM

## 2021-09-02 DIAGNOSIS — F41.9 ANXIETY: ICD-10-CM

## 2021-09-02 PROCEDURE — 99212 OFFICE O/P EST SF 10 MIN: CPT | Mod: TEL | Performed by: FAMILY MEDICINE

## 2021-09-02 RX ORDER — METHYLPHENIDATE HYDROCHLORIDE 40 MG/1
40 CAPSULE, EXTENDED RELEASE ORAL DAILY
Qty: 30 CAPSULE | Refills: 0 | Status: SHIPPED | OUTPATIENT
Start: 2021-10-03 | End: 2021-11-02

## 2021-09-02 RX ORDER — METHYLPHENIDATE HYDROCHLORIDE 40 MG/1
40 CAPSULE, EXTENDED RELEASE ORAL DAILY
Qty: 30 CAPSULE | Refills: 0 | Status: SHIPPED | OUTPATIENT
Start: 2021-09-02 | End: 2021-10-02

## 2021-09-02 RX ORDER — METHYLPHENIDATE HYDROCHLORIDE 40 MG/1
40 CAPSULE, EXTENDED RELEASE ORAL DAILY
Qty: 30 CAPSULE | Refills: 0 | Status: SHIPPED | OUTPATIENT
Start: 2021-11-03 | End: 2021-12-03

## 2021-09-02 RX ORDER — METHYLPHENIDATE HYDROCHLORIDE 40 MG/1
40 CAPSULE, EXTENDED RELEASE ORAL EVERY MORNING
COMMUNITY
Start: 2021-08-10 | End: 2022-03-23

## 2021-09-02 ASSESSMENT — ANXIETY QUESTIONNAIRES
GAD7 TOTAL SCORE: 2
2. NOT BEING ABLE TO STOP OR CONTROL WORRYING: NOT AT ALL
6. BECOMING EASILY ANNOYED OR IRRITABLE: NOT AT ALL
1. FEELING NERVOUS, ANXIOUS, OR ON EDGE: NOT AT ALL
5. BEING SO RESTLESS THAT IT IS HARD TO SIT STILL: NOT AT ALL
7. FEELING AFRAID AS IF SOMETHING AWFUL MIGHT HAPPEN: NOT AT ALL
3. WORRYING TOO MUCH ABOUT DIFFERENT THINGS: SEVERAL DAYS
IF YOU CHECKED OFF ANY PROBLEMS ON THIS QUESTIONNAIRE, HOW DIFFICULT HAVE THESE PROBLEMS MADE IT FOR YOU TO DO YOUR WORK, TAKE CARE OF THINGS AT HOME, OR GET ALONG WITH OTHER PEOPLE: NOT DIFFICULT AT ALL

## 2021-09-02 ASSESSMENT — PATIENT HEALTH QUESTIONNAIRE - PHQ9: 5. POOR APPETITE OR OVEREATING: SEVERAL DAYS

## 2021-09-02 ASSESSMENT — PAIN SCALES - GENERAL: PAINLEVEL: NO PAIN (0)

## 2021-09-02 NOTE — PROGRESS NOTES
"Nursing Notes:   Leanne Recinos LPN  9/2/2021  4:22 PM  Signed  Chief Complaint   Patient presents with     Recheck Medication       Initial Wt 95.3 kg (210 lb)   LMP 08/28/2021   Breastfeeding No   BMI 37.20 kg/m   Estimated body mass index is 37.2 kg/m  as calculated from the following:    Height as of 2/4/21: 1.6 m (5' 3\").    Weight as of this encounter: 95.3 kg (210 lb).  Medication Reconciliation: complete    FOOD SECURITY SCREENING QUESTIONS  Hunger Vital Signs:  Within the past 12 months we worried whether our food would run out before we got money to buy more. Never  Within the past 12 months the food we bought just didn't last and we didn't have money to get more. Never    Leanne Recinos LPN      Tia is a 30 year old who is being evaluated via a billable telephone visit.      What phone number would you like to be contacted at? [phone]  How would you like to obtain your AVS? Norma        Ammon Weber is a 30 year old who presents for telephone visit for follow-up.    HPI I last saw her virtually 4 months ago.  She continues on Ritalin LA 40 mg daily for ADHD.  She is also on Lexapro 20 mg daily and clonazepam on a as needed basis.  She takes Seroquel 25 mg at bedtime.  She has had good control of her anxiety and her sleep has been better.  Her last ToxAssure was in May.          Review of Systems     ROS is negative except as noted above         Objective    Vitals - Patient Reported  Pain Score: No Pain (0)        Physical Exam   healthy, alert and no distress  PSYCH: Alert and oriented times 3; coherent speech, normal   rate and volume, able to articulate logical thoughts, able   to abstract reason, no tangential thoughts, no hallucinations   or delusions  Her affect is normal  RESP: No cough, no audible wheezing, able to talk in full sentences  Remainder of exam unable to be completed due to telephone visits        Tia was seen today for recheck " medication.    Diagnoses and all orders for this visit:    Attention deficit hyperactivity disorder (ADHD), unspecified ADHD type    Controlled substance agreement signed 2/20/18, 7/2/19, 10/26/2020    Anxiety       She feels like the medications are a good balance and she seems to be doing reasonably well.  She had a stressful day at work today and we discussed that in some detail.  Support encouragement offered.  Renewal of her medications pursuant to her contract will keep her other medicines the same.  Follow-up in 3 months, sooner if needed          Phone call duration: 12 minutes    Nicho Peoples MD

## 2021-09-02 NOTE — NURSING NOTE
"Chief Complaint   Patient presents with     Recheck Medication       Initial Wt 95.3 kg (210 lb)   LMP 08/28/2021   Breastfeeding No   BMI 37.20 kg/m   Estimated body mass index is 37.2 kg/m  as calculated from the following:    Height as of 2/4/21: 1.6 m (5' 3\").    Weight as of this encounter: 95.3 kg (210 lb).  Medication Reconciliation: complete    FOOD SECURITY SCREENING QUESTIONS  Hunger Vital Signs:  Within the past 12 months we worried whether our food would run out before we got money to buy more. Never  Within the past 12 months the food we bought just didn't last and we didn't have money to get more. Never    Leanne Recinos LPN  "

## 2021-09-03 ASSESSMENT — ANXIETY QUESTIONNAIRES: GAD7 TOTAL SCORE: 2

## 2021-09-25 ENCOUNTER — ALLIED HEALTH/NURSE VISIT (OUTPATIENT)
Dept: FAMILY MEDICINE | Facility: OTHER | Age: 31
End: 2021-09-25
Attending: FAMILY MEDICINE
Payer: COMMERCIAL

## 2021-09-25 DIAGNOSIS — Z20.822 EXPOSURE TO 2019 NOVEL CORONAVIRUS: Primary | ICD-10-CM

## 2021-09-25 PROCEDURE — C9803 HOPD COVID-19 SPEC COLLECT: HCPCS

## 2021-09-25 PROCEDURE — U0005 INFEC AGEN DETEC AMPLI PROBE: HCPCS | Mod: ZL

## 2021-09-26 LAB — SARS-COV-2 RNA RESP QL NAA+PROBE: NEGATIVE

## 2021-10-09 ENCOUNTER — HEALTH MAINTENANCE LETTER (OUTPATIENT)
Age: 31
End: 2021-10-09

## 2021-10-22 ENCOUNTER — OFFICE VISIT (OUTPATIENT)
Dept: OBGYN | Facility: OTHER | Age: 31
End: 2021-10-22
Attending: OBSTETRICS & GYNECOLOGY
Payer: COMMERCIAL

## 2021-10-22 VITALS
WEIGHT: 209.8 LBS | SYSTOLIC BLOOD PRESSURE: 124 MMHG | OXYGEN SATURATION: 98 % | RESPIRATION RATE: 16 BRPM | HEART RATE: 101 BPM | DIASTOLIC BLOOD PRESSURE: 82 MMHG | BODY MASS INDEX: 37.17 KG/M2 | HEIGHT: 63 IN

## 2021-10-22 DIAGNOSIS — Z31.69 ENCOUNTER FOR PRECONCEPTION CONSULTATION: Primary | ICD-10-CM

## 2021-10-22 DIAGNOSIS — N97.9 FEMALE INFERTILITY: ICD-10-CM

## 2021-10-22 DIAGNOSIS — Z01.812 PRE-PROCEDURE LAB EXAM: ICD-10-CM

## 2021-10-22 LAB
CORTIS SERPL-MCNC: 14.2 UG/DL (ref 4–22)
FASTING STATUS PATIENT QL REPORTED: NORMAL
GLUCOSE BLD-MCNC: 94 MG/DL (ref 70–105)
PROLACTIN SERPL-MCNC: 14 UG/L (ref 3–27)
TESTOST SERPL-MCNC: 41 NG/DL (ref 8–60)
TSH SERPL DL<=0.005 MIU/L-ACNC: 1.45 MU/L (ref 0.4–4)

## 2021-10-22 PROCEDURE — 84403 ASSAY OF TOTAL TESTOSTERONE: CPT | Mod: ZL | Performed by: OBSTETRICS & GYNECOLOGY

## 2021-10-22 PROCEDURE — 84443 ASSAY THYROID STIM HORMONE: CPT | Mod: ZL | Performed by: OBSTETRICS & GYNECOLOGY

## 2021-10-22 PROCEDURE — 83498 ASY HYDROXYPROGESTERONE 17-D: CPT | Mod: ZL | Performed by: OBSTETRICS & GYNECOLOGY

## 2021-10-22 PROCEDURE — 82947 ASSAY GLUCOSE BLOOD QUANT: CPT | Mod: ZL | Performed by: OBSTETRICS & GYNECOLOGY

## 2021-10-22 PROCEDURE — 82533 TOTAL CORTISOL: CPT | Mod: ZL | Performed by: OBSTETRICS & GYNECOLOGY

## 2021-10-22 PROCEDURE — 82627 DEHYDROEPIANDROSTERONE: CPT | Mod: ZL | Performed by: OBSTETRICS & GYNECOLOGY

## 2021-10-22 PROCEDURE — 36415 COLL VENOUS BLD VENIPUNCTURE: CPT | Mod: ZL | Performed by: OBSTETRICS & GYNECOLOGY

## 2021-10-22 PROCEDURE — 84146 ASSAY OF PROLACTIN: CPT | Mod: ZL | Performed by: OBSTETRICS & GYNECOLOGY

## 2021-10-22 PROCEDURE — 99204 OFFICE O/P NEW MOD 45 MIN: CPT | Performed by: OBSTETRICS & GYNECOLOGY

## 2021-10-22 ASSESSMENT — MIFFLIN-ST. JEOR: SCORE: 1650.78

## 2021-10-22 ASSESSMENT — PAIN SCALES - GENERAL: PAINLEVEL: NO PAIN (0)

## 2021-10-22 ASSESSMENT — PATIENT HEALTH QUESTIONNAIRE - PHQ9: SUM OF ALL RESPONSES TO PHQ QUESTIONS 1-9: 0

## 2021-10-22 NOTE — PROGRESS NOTES
CC: Preconception counseling  HPI:  Tia is a 30 year old female who presents for preconception counseling.  She is on prenatal vitamins.  She has been attempting to become pregnant for 3 years with her .  She has been off birth control for 4 years.  She has never been pregnant.  She denies history of sexually transmissible infection.  She has normal Pap smears and are up-to-date.  She is getting a cycle every month.  She is doing home ovulation prediction testing which has been positive.  Her  is present with her today and has never fathered a child.  Both she and her  deny drug abuse.  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  He denies any medical problems.  She denies diabetes.  She has a sister who has polycystic ovarian syndrome.  Her sister does have children now.  She has a bleeding tendency and has been worked up for hemophilia with negative test results.  Patient's last menstrual period was 10/09/2021 (exact date).  No other medical concerns.  Past surgical history was significant for a retropelvic mass resulting in excision of the mass as well as her tailbone a few years ago.  It was a benign tumor.  It sounds similar to a pilonidal cyst, but I have not been able to confirm that.    OB History   No obstetric history on file.     Past Medical History:   Diagnosis Date     Helicobacter pylori infection     No Comments Provided     Past Surgical History:   Procedure Laterality Date     exision mass retrorectal and coccyx  06/09/2017    Ori Espinoza MD at [facility]     Social History     Socioeconomic History     Marital status:      Spouse name: Not on file     Number of children: Not on file     Years of education: Not on file     Highest education level: Not on file   Occupational History     Not on file   Tobacco Use     Smoking status: Never Smoker     Smokeless tobacco: Never Used   Vaping Use     Vaping Use: Never used   Substance and Sexual  Activity     Alcohol use: Not Currently     Alcohol/week: 4.2 standard drinks     Drug use: No     Sexual activity: Yes     Partners: Male     Birth control/protection: Injection   Other Topics Concern     Not on file   Social History Narrative    Graduated from college, 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  Moved back to [place] in February 2018.  Engaged to be  July 2018.    Dannie Father    Criselda  Mother    Sibs     Joanne 1989    Juan Miguel 1991    Yury 1996.     Social Determinants of Health     Financial Resource Strain:      Difficulty of Paying Living Expenses:    Food Insecurity:      Worried About Running Out of Food in the Last Year:      Ran Out of Food in the Last Year:    Transportation Needs:      Lack of Transportation (Medical):      Lack of Transportation (Non-Medical):    Physical Activity:      Days of Exercise per Week:      Minutes of Exercise per Session:    Stress:      Feeling of Stress :    Social Connections:      Frequency of Communication with Friends and Family:      Frequency of Social Gatherings with Friends and Family:      Attends Gnosticist Services:      Active Member of Clubs or Organizations:      Attends Club or Organization Meetings:      Marital Status:    Intimate Partner Violence:      Fear of Current or Ex-Partner:      Emotionally Abused:      Physically Abused:      Sexually Abused:      Family History   Problem Relation Age of Onset     Cancer Father         Cancer, h/o testicular CA     Heart Disease Maternal Grandmother         Heart Disease,cardiomyopathy     Blood Disease Maternal Grandmother         Blood Disease,blood dyscrasia       Current Outpatient Medications   Medication     clonazePAM (KLONOPIN) 1 MG tablet     escitalopram (LEXAPRO) 20 MG tablet     methylphenidate (RITALIN LA) 40 MG 24 hr capsule     methylphenidate (RITALIN LA) 40 MG 24 hr capsule     [START ON 11/3/2021] methylphenidate (RITALIN LA) 40 MG 24 hr capsule      "naproxen (NAPROSYN) 500 MG tablet     ondansetron (ZOFRAN-ODT) 4 MG ODT tab     QUEtiapine (SEROQUEL) 25 MG tablet     triamcinolone (KENALOG) 0.1 % external cream     No current facility-administered medications for this visit.     Allergies   Allergen Reactions     Other  [No Clinical Screening - See Comments]      Other reaction(s): Throat Irritation  Carmex, Pine, Icy Hot     [facility] Cleansing Skin      Other reaction(s): Swelling     Menthol      Other reaction(s): Swelling     Pine Tar      Other reaction(s): Runny Nose     /82 (BP Location: Right arm, Patient Position: Sitting, Cuff Size: Adult Regular)   Pulse 101   Resp 16   Ht 1.6 m (5' 3\")   Wt 95.2 kg (209 lb 12.8 oz)   LMP 10/09/2021 (Exact Date)   SpO2 98%   Breastfeeding No   BMI 37.16 kg/m      REVIEW OF SYSTEMS  Negative except as noted above    Exam:  Constitutional: healthy, alert, active and no distress      Lab: No results found for any visits on 10/22/21.    ASSESSMENT/PLAN :  1. Encounter for preconception consultation    2. Female infertility    3. Pre-procedure lab exam      We discussed specifically the likelihood of conception on their own given that they have been trying for 3 years.  It is about 1 %/month.  We discussed work-up for infertility to include hysterosalpingogram to prove tubal patency, semen analysis to rule out male factor infertility, lab testing to exclude medical conditions which could produce symptoms similar to PCOS.  Assuming normal HSG and normal semen counts would recommend proceeding with letrozole, midcycle ultrasound for follicle tracking, Ovidrel ovulation induction, plus or minus artificial insemination for up to 6 ovulatory cycles.  If not pregnant at that point would then refer to reproductive endocrinology and infertility for discussion of in vitro fertilization.  They were referred to the  for insight into the cost of this work-up and treatments.  Both her and her  seem " satisfied with the discussion and the plan.    Gulshan Larson MD FACOG  9:07 AM 10/22/2021

## 2021-10-22 NOTE — NURSING NOTE
"Patient presents to the clinic today for infertility consult.  Rosa William LPN 10/22/2021   8:57 AM    Chief Complaint   Patient presents with     Consult     Infertility       Initial /82 (BP Location: Right arm, Patient Position: Sitting, Cuff Size: Adult Regular)   Pulse 101   Resp 16   Ht 1.6 m (5' 3\")   Wt 95.2 kg (209 lb 12.8 oz)   LMP 10/09/2021 (Exact Date)   SpO2 98%   Breastfeeding No   BMI 37.16 kg/m   Estimated body mass index is 37.16 kg/m  as calculated from the following:    Height as of this encounter: 1.6 m (5' 3\").    Weight as of this encounter: 95.2 kg (209 lb 12.8 oz).  Medication Reconciliation: complete  Rosa William LPN    FOOD SECURITY SCREENING QUESTIONS  Hunger Vital Signs:  Within the past 12 months we worried whether our food would run out before we got money to buy more. Never  Within the past 12 months the food we bought just didn't last and we didn't have money to get more. Never  Rosa William LPN 10/22/2021 8:57 AM      "

## 2021-10-25 LAB
17OHP SERPL-MCNC: 106 NG/DL
DHEA-S SERPL-MCNC: 118 UG/DL (ref 35–430)

## 2021-11-08 ENCOUNTER — HOSPITAL ENCOUNTER (OUTPATIENT)
Dept: GENERAL RADIOLOGY | Facility: OTHER | Age: 31
End: 2021-11-08
Attending: OBSTETRICS & GYNECOLOGY
Payer: COMMERCIAL

## 2021-11-08 ENCOUNTER — OFFICE VISIT (OUTPATIENT)
Dept: OBGYN | Facility: OTHER | Age: 31
End: 2021-11-08
Attending: OBSTETRICS & GYNECOLOGY
Payer: COMMERCIAL

## 2021-11-08 ENCOUNTER — LAB (OUTPATIENT)
Dept: LAB | Facility: OTHER | Age: 31
End: 2021-11-08
Attending: OBSTETRICS & GYNECOLOGY
Payer: COMMERCIAL

## 2021-11-08 DIAGNOSIS — N97.9 FEMALE INFERTILITY: Primary | ICD-10-CM

## 2021-11-08 DIAGNOSIS — Z01.812 PRE-PROCEDURE LAB EXAM: ICD-10-CM

## 2021-11-08 DIAGNOSIS — N97.9 FEMALE INFERTILITY: ICD-10-CM

## 2021-11-08 LAB — HCG UR QL: NEGATIVE

## 2021-11-08 PROCEDURE — 81025 URINE PREGNANCY TEST: CPT | Mod: ZL

## 2021-11-08 PROCEDURE — 255N000002 HC RX 255 OP 636: Performed by: OBSTETRICS & GYNECOLOGY

## 2021-11-08 PROCEDURE — 58340 CATHETER FOR HYSTEROGRAPHY: CPT

## 2021-11-08 PROCEDURE — 58340 CATHETER FOR HYSTEROGRAPHY: CPT | Performed by: OBSTETRICS & GYNECOLOGY

## 2021-11-08 RX ADMIN — IOHEXOL 10 ML: 240 INJECTION, SOLUTION INTRATHECAL; INTRAVASCULAR; INTRAVENOUS; ORAL at 15:54

## 2021-11-08 NOTE — PROGRESS NOTES
Hysterosalpingogram:    Patient was brought to the x-ray imaging suite and consent obtained in writing for hysterosalpingogram.  She was positioned in dorsal lithotomy position in stirrups.  A speculum was placed.  Cervix was prepped with Betadine and a tenaculum placed on the anterior lip of the cervix.  An HSG catheter was inserted after gently dilating the cervix with an os finder.  The balloon was inflated.  Under fluoroscopic imaging as well as still x-ray imaging 6 ml of Omnipaque was injected through the HSG catheter showing no intrauterine masses and bilateral patency of her fallopian tubes with spill noted intraperitoneal on both sides.  The HSG catheter balloon was deflated and easily removed.  The tenaculum was released from the cervix.  No vaginal bleeding was noted.  The patient tolerated the procedure well.  Please see radiology report for further details.

## 2021-11-09 DIAGNOSIS — F41.1 GENERALIZED ANXIETY DISORDER: ICD-10-CM

## 2021-11-10 RX ORDER — CLONAZEPAM 1 MG/1
TABLET ORAL
Qty: 60 TABLET | Refills: 5 | Status: SHIPPED | OUTPATIENT
Start: 2021-11-10 | End: 2022-05-23

## 2021-11-10 NOTE — TELEPHONE ENCOUNTER
CLONAZEPAM 1MG TABLETS  Last Written Prescription Date:  5/10/21  Last Fill Quantity: 60,   # refills: 5  Last Office Visit: 9/2/21  Future Office visit:       Routing refill request to provider for review/approval because:  Drug not on the [facility], [facility] or [facility] refill protocol or controlled substance. Unable to complete prescription refill per RNMedication Refill Policy.................... Kelley Ledesma RN ....................  11/10/2021   9:36 AM

## 2021-11-24 ENCOUNTER — TELEPHONE (OUTPATIENT)
Dept: OBGYN | Facility: OTHER | Age: 31
End: 2021-11-24
Payer: COMMERCIAL

## 2021-11-24 NOTE — TELEPHONE ENCOUNTER
Patient's  calling, verification of patient name and . States was told to call OB RN back, message left 2021 on 's phone. Requesting  relay verbal consent for patient to receive labs.  states had recent consult with Dr. Tuttle.  is unclear if this is in regards to his lab results or wife's lab results. Notation made within chart for potential future reference.  verbalizes understanding and has no further questions or concerns at this time. Sandy Cespedes RN ....................  2021   1:45 PM

## 2021-11-29 ENCOUNTER — TELEPHONE (OUTPATIENT)
Dept: OBGYN | Facility: OTHER | Age: 31
End: 2021-11-29
Payer: COMMERCIAL

## 2021-11-29 DIAGNOSIS — N97.9 FEMALE INFERTILITY: Primary | ICD-10-CM

## 2021-11-29 RX ORDER — LETROZOLE 2.5 MG/1
2.5 TABLET, FILM COATED ORAL DAILY
Qty: 5 TABLET | Refills: 0 | Status: SHIPPED | OUTPATIENT
Start: 2021-11-29 | End: 2021-11-29

## 2021-11-29 NOTE — TELEPHONE ENCOUNTER
Call to patient who would like to push her cycle back one month now. Per DAB 2.5 of letrozole for 5 days then US on day 11-12. She will call on Day one of her cycle.     Gertrudis Davila RN on 11/29/2021 at 2:15 PM

## 2021-11-29 NOTE — TELEPHONE ENCOUNTER
The patient stated that Dr Larson wanted the patient to call when she had her period.  She had her period.

## 2021-12-07 ENCOUNTER — OFFICE VISIT (OUTPATIENT)
Dept: FAMILY MEDICINE | Facility: OTHER | Age: 31
End: 2021-12-07
Attending: FAMILY MEDICINE
Payer: COMMERCIAL

## 2021-12-07 VITALS
WEIGHT: 209 LBS | OXYGEN SATURATION: 98 % | SYSTOLIC BLOOD PRESSURE: 128 MMHG | HEART RATE: 100 BPM | DIASTOLIC BLOOD PRESSURE: 88 MMHG | RESPIRATION RATE: 16 BRPM | TEMPERATURE: 97.3 F | BODY MASS INDEX: 37.02 KG/M2

## 2021-12-07 DIAGNOSIS — F41.1 GENERALIZED ANXIETY DISORDER: ICD-10-CM

## 2021-12-07 DIAGNOSIS — Z79.899 CONTROLLED SUBSTANCE AGREEMENT SIGNED: ICD-10-CM

## 2021-12-07 DIAGNOSIS — F90.9 ATTENTION DEFICIT HYPERACTIVITY DISORDER (ADHD), UNSPECIFIED ADHD TYPE: Primary | ICD-10-CM

## 2021-12-07 LAB — CREAT UR-MCNC: 380 MG/DL

## 2021-12-07 PROCEDURE — 80307 DRUG TEST PRSMV CHEM ANLYZR: CPT | Mod: ZL | Performed by: FAMILY MEDICINE

## 2021-12-07 PROCEDURE — 99213 OFFICE O/P EST LOW 20 MIN: CPT | Performed by: FAMILY MEDICINE

## 2021-12-07 RX ORDER — METHYLPHENIDATE HYDROCHLORIDE 40 MG/1
40 CAPSULE, EXTENDED RELEASE ORAL DAILY
Qty: 30 CAPSULE | Refills: 0 | Status: SHIPPED | OUTPATIENT
Start: 2021-12-07 | End: 2022-01-06

## 2021-12-07 RX ORDER — METHYLPHENIDATE HYDROCHLORIDE 40 MG/1
40 CAPSULE, EXTENDED RELEASE ORAL DAILY
Qty: 30 CAPSULE | Refills: 0 | Status: SHIPPED | OUTPATIENT
Start: 2022-01-07 | End: 2022-02-06

## 2021-12-07 RX ORDER — QUETIAPINE FUMARATE 25 MG/1
25-50 TABLET, FILM COATED ORAL AT BEDTIME
Qty: 60 TABLET | Refills: 11 | Status: SHIPPED | OUTPATIENT
Start: 2021-12-07 | End: 2023-03-08

## 2021-12-07 RX ORDER — METHYLPHENIDATE HYDROCHLORIDE 40 MG/1
40 CAPSULE, EXTENDED RELEASE ORAL DAILY
Qty: 30 CAPSULE | Refills: 0 | Status: SHIPPED | OUTPATIENT
Start: 2022-02-07 | End: 2022-03-09

## 2021-12-07 ASSESSMENT — ANXIETY QUESTIONNAIRES
7. FEELING AFRAID AS IF SOMETHING AWFUL MIGHT HAPPEN: NOT AT ALL
8. IF YOU CHECKED OFF ANY PROBLEMS, HOW DIFFICULT HAVE THESE MADE IT FOR YOU TO DO YOUR WORK, TAKE CARE OF THINGS AT HOME, OR GET ALONG WITH OTHER PEOPLE?: NOT DIFFICULT AT ALL
6. BECOMING EASILY ANNOYED OR IRRITABLE: NOT AT ALL
5. BEING SO RESTLESS THAT IT IS HARD TO SIT STILL: NOT AT ALL
7. FEELING AFRAID AS IF SOMETHING AWFUL MIGHT HAPPEN: NOT AT ALL
1. FEELING NERVOUS, ANXIOUS, OR ON EDGE: NOT AT ALL
GAD7 TOTAL SCORE: 1
2. NOT BEING ABLE TO STOP OR CONTROL WORRYING: SEVERAL DAYS
4. TROUBLE RELAXING: NOT AT ALL
GAD7 TOTAL SCORE: 1
3. WORRYING TOO MUCH ABOUT DIFFERENT THINGS: NOT AT ALL
GAD7 TOTAL SCORE: 1

## 2021-12-07 ASSESSMENT — PATIENT HEALTH QUESTIONNAIRE - PHQ9
10. IF YOU CHECKED OFF ANY PROBLEMS, HOW DIFFICULT HAVE THESE PROBLEMS MADE IT FOR YOU TO DO YOUR WORK, TAKE CARE OF THINGS AT HOME, OR GET ALONG WITH OTHER PEOPLE: NOT DIFFICULT AT ALL
SUM OF ALL RESPONSES TO PHQ QUESTIONS 1-9: 1
SUM OF ALL RESPONSES TO PHQ QUESTIONS 1-9: 1

## 2021-12-07 ASSESSMENT — PAIN SCALES - GENERAL: PAINLEVEL: NO PAIN (0)

## 2021-12-07 NOTE — NURSING NOTE
"Chief Complaint   Patient presents with     Recheck Medication       Initial /88   Pulse 100   Temp 97.3  F (36.3  C) (Temporal)   Resp 16   Wt 94.8 kg (209 lb)   LMP 11/26/2021   SpO2 98%   BMI 37.02 kg/m   Estimated body mass index is 37.02 kg/m  as calculated from the following:    Height as of 10/22/21: 1.6 m (5' 3\").    Weight as of this encounter: 94.8 kg (209 lb).  Medication Reconciliation: complete    FOOD SECURITY SCREENING QUESTIONS  Hunger Vital Signs:  Within the past 12 months we worried whether our food would run out before we got money to buy more. Never  Within the past 12 months the food we bought just didn't last and we didn't have money to get more. Never    Leanne Recinos, IVAN  "

## 2021-12-07 NOTE — PROGRESS NOTES
"Nursing Notes:   Leanne Recinos LPN  12/7/2021  4:38 PM  Signed  Chief Complaint   Patient presents with     Recheck Medication       Initial /88   Pulse 100   Temp 97.3  F (36.3  C) (Temporal)   Resp 16   Wt 94.8 kg (209 lb)   LMP 11/26/2021   SpO2 98%   BMI 37.02 kg/m   Estimated body mass index is 37.02 kg/m  as calculated from the following:    Height as of 10/22/21: 1.6 m (5' 3\").    Weight as of this encounter: 94.8 kg (209 lb).  Medication Reconciliation: complete    FOOD SECURITY SCREENING QUESTIONS  Hunger Vital Signs:  Within the past 12 months we worried whether our food would run out before we got money to buy more. Never  Within the past 12 months the food we bought just didn't last and we didn't have money to get more. Never    Leanne Recinos LPN    SUBJECTIVE:  Tia Sethi  is a 31 year old female who comes in for refill of her Ritalin LA.  She feels the dose is okay.  Her anxiety has been well controlled and she is not had as much nausea.  Should she still taking Klonopin at bedtime along with Seroquel and occasionally has to take an extra half of Seroquel.    They have been going through fertility treatment which is been somewhat stressful.  Support encouragement offered in this regard.    Past Medical, Family, and Social History reviewed and updated as noted below.   ROS is negative except as noted above       Allergies   Allergen Reactions     Other  [No Clinical Screening - See Comments]      Other reaction(s): Throat Irritation  Carmex, Pine, Icy Hot     [facility] Cleansing Skin      Other reaction(s): Swelling     Menthol      Other reaction(s): Swelling     Pine Tar      Other reaction(s): Runny Nose   ,   Family History   Problem Relation Age of Onset     Cancer Father         Cancer, h/o testicular CA     Heart Disease Maternal Grandmother         Heart Disease,cardiomyopathy     Blood Disease Maternal Grandmother         Blood Disease,blood dyscrasia   ,   Current " Outpatient Medications   Medication     clonazePAM (KLONOPIN) 1 MG tablet     escitalopram (LEXAPRO) 20 MG tablet     methylphenidate (RITALIN LA) 40 MG 24 hr capsule     [START ON 1/7/2022] methylphenidate (RITALIN LA) 40 MG 24 hr capsule     [START ON 2/7/2022] methylphenidate (RITALIN LA) 40 MG 24 hr capsule     methylphenidate (RITALIN LA) 40 MG 24 hr capsule     naproxen (NAPROSYN) 500 MG tablet     ondansetron (ZOFRAN-ODT) 4 MG ODT tab     QUEtiapine (SEROQUEL) 25 MG tablet     triamcinolone (KENALOG) 0.1 % external cream     No current facility-administered medications for this visit.   ,   Past Medical History:   Diagnosis Date     Helicobacter pylori infection     No Comments Provided   ,   Patient Active Problem List    Diagnosis Date Noted     Anxiety 02/05/2019     Priority: Medium     Controlled substance agreement signed 2/20/18, 7/2/19, 10/26/2020 02/20/2018     Priority: Medium     Allergic rhinitis due to pollen 01/30/2018     Priority: Medium     Overview:   Spring and fall       Contact dermatitis and eczema 01/30/2018     Priority: Medium     Overview:   Left ear       Menorrhagia with irregular cycle 01/30/2018     Priority: Medium     Chronic ulcer of sacral region with fat layer exposed (H) 09/26/2017     Priority: Medium     Other granulomatous disorders of the skin and subcutaneous tissue 09/26/2017     Priority: Medium     Mass of perirectal soft tissue 04/12/2017     Priority: Medium     Screening for malignant neoplasm of cervix 03/17/2016     Priority: Medium     Overview:   2016 NILM  PLAN: PAP TEST 3/2019  ; PAP TEST HISTORY       ADHD 09/24/2010     Priority: Medium     Overview:   Evaluation from [facility] done.     ,   Past Surgical History:   Procedure Laterality Date     exision mass retrorectal and coccyx  06/09/2017    Ori Espinoza MD at [facility]    and   Social History     Tobacco Use     Smoking status: Never Smoker     Smokeless tobacco: Never Used    Substance Use Topics     Alcohol use: Not Currently     Alcohol/week: 4.2 standard drinks     OBJECTIVE:  /88   Pulse 100   Temp 97.3  F (36.3  C) (Temporal)   Resp 16   Wt 94.8 kg (209 lb)   LMP 11/26/2021   SpO2 98%   BMI 37.02 kg/m     EXAM:  Alert cooperative, no distress.  Affect is appropriate.  Reviewed labs with her and reviewed her imaging studies.  ASSESSMENT/Plan :    Tia was seen today for recheck medication.    Diagnoses and all orders for this visit:    Attention deficit hyperactivity disorder (ADHD), unspecified ADHD type  -     methylphenidate (RITALIN LA) 40 MG 24 hr capsule; Take 1 capsule (40 mg) by mouth daily  -     methylphenidate (RITALIN LA) 40 MG 24 hr capsule; Take 1 capsule (40 mg) by mouth daily  -     methylphenidate (RITALIN LA) 40 MG 24 hr capsule; Take 1 capsule (40 mg) by mouth daily  -     Drug Confirmation Panel Urine with Creat; Future  -     Drug Confirmation Panel Urine with Creat    Generalized anxiety disorder  -     QUEtiapine (SEROQUEL) 25 MG tablet; Take 1-2 tablets (25-50 mg) by mouth At Bedtime  -     Drug Confirmation Panel Urine with Creat; Future  -     Drug Confirmation Panel Urine with Creat    Controlled substance agreement signed  -     methylphenidate (RITALIN LA) 40 MG 24 hr capsule; Take 1 capsule (40 mg) by mouth daily  -     methylphenidate (RITALIN LA) 40 MG 24 hr capsule; Take 1 capsule (40 mg) by mouth daily  -     methylphenidate (RITALIN LA) 40 MG 24 hr capsule; Take 1 capsule (40 mg) by mouth daily  -     Drug Confirmation Panel Urine with Creat; Future  -     Drug Confirmation Panel Urine with Creat      Support encouragement offered with regard to their fertility struggles.  Renewal on Ritalin LA.  Tox[employer] and medication agreement done today.  Renewed Seroquel with the adjusted dose.  Unable to review the PDMP as the site is down.  Follow-up in 3 months, sooner if needed.  She understands that she needs to suspend her medication  when she is pregnant.    Nicho Peoples MD            Answers for HPI/ROS submitted by the patient on 12/7/2021  If you checked off any problems, how difficult have these problems made it for you to do your work, take care of things at home, or get along with other people?: Not difficult at all  PHQ9 TOTAL SCORE: 1  ZOEY 7 TOTAL SCORE: 1

## 2021-12-07 NOTE — LETTER
[facility]  12/07/21  Patient: Tia Sethi  YOB: 1990  Medical Record Number: [id number]                                                                                  Non-Opioid Controlled Substance Agreement    This is an agreement between you and your provider regarding safe and appropriate use of controlled substances prescribed by your care team. Controlled substances are?medicines that can cause physical and mental dependence (abuse).     There are strict laws about having and using these medicines. We here at [facility] are  committed to working with you in your efforts to get better. To support you in this work, we'll help you schedule regular office appointments for medicine refills. If we must cancel or change your appointment for any reason, we'll make sure you have enough medicine to last until your next appointment.     As a Provider, I will:     Listen carefully to your concerns while treating you with respect.     Recommend a treatment plan that I believe is in your best interest and may involve therapies other than medicine.      Talk with you often about the possible benefits and the risk of harm of any medicine that we prescribe for you.    Assess the safety of this medicine and check how well it works.      Provide a plan on how to taper (discontinue or go off) using this medicine if the decision is made to stop its use.      ::  As a Patient, I understand controlled substances:       Are prescribed by my care provider to help me function or work and manage my condition(s).?    Are strong medicines and can cause serious side effects.       Need to be taken exactly as prescribed.?Combining controlled substances with certain medicines or chemicals (such as illegal drugs, alcohol, sedatives, sleeping pills, and benzodiazepines) can be dangerous or even fatal.? If I stop taking my medicines suddenly, I may have severe withdrawal symptoms.     The  risks, benefits, and side effects of these medicine(s) were explained to me. I agree that:    1. I will take part in other treatments as advised by my care team. This may be psychiatry or counseling, physical therapy, behavioral therapy, group treatment or a referral to specialist.    2. I will keep all my appointments and understand this is part of the monitoring of controlled substances.?My care team may require an office visit for EVERY controlled substance refill. If I miss appointments or don t follow instructions, my care team may stop my medicine    3. I will take my medicines as prescribed. I will not change the dose or schedule unless my care team tells me to. There will be no refills if I run out early.      4. I may be asked to come to the clinic and complete a urine drug test or complete a pill count. If I don t give a urine sample or participate in a pill count, the care team may stop my medicine.    5. I will only receive controlled substance prescriptions from this clinic. If I am treated by another provider, I will tell them that I am taking controlled substances and that I have a treatment agreement with this provider. I will inform my [facility] care team within one business day if I am given a prescription for any controlled substance by another healthcare provider. My [facility] care team can contact other providers and pharmacists about my use of any medicines.    6. It is up to me to make sure that I don't run out of my medicines on weekends or holidays.?If my care team is willing to refill my prescription without a visit, I must request refills only during office hours. Refills may take up to 3 business days to process. I will use one pharmacy to fill all my controlled substance prescriptions. I will notify the clinic about any changes to my insurance or medicine availability.    7. I am responsible for my prescriptions. If the medicine/prescription is lost, stolen or destroyed,  it will not be replaced.?I also agree not to share controlled substance medicines with anyone.     8. I am aware I should not use any illegal or recreational drugs. I agree not to drink alcohol unless my care team says I can.     9. If I enroll in the [place] Medical Cannabis program, I will tell my care team before my next refill.    10. I will tell my care team right away if I become pregnant, have a new medical problem treated outside of my regular clinic, or have a change in my medicines.     11. I understand that this medicine can affect my thinking, judgment and reaction time.? Alcohol and drugs affect the brain and body, which can affect the safety of my driving. Being under the influence of alcohol or drugs can affect my decision-making, behaviors, personal safety and the safety of others. Driving while impaired (DWI) can occur if a person is driving, operating or in physical control of a car, motorcycle, boat, snowmobile, ATV, motorbike, off-road vehicle or any other motor vehicle ([place] Statute 169A.20). I understand the risk if I choose to drive or operate any vehicle or machinery.    I understand that if I do not follow any of the conditions above, my prescriptions or treatment may be stopped or changed.   I agree that my provider, clinic care team and pharmacy may work with any city, state or federal law enforcement agency that investigates the misuse, sale or other diversion of my controlled medicine. I will allow my provider to discuss my care with, or share a copy of, this agreement with any other treating provider, pharmacy or emergency room where I receive care.     I have read this agreement and have asked questions about anything I did not understand.    ________________________________________________________  Patient Signature - Tia Sethi     ___________________                   Date     ________________________________________________________  Provider Signature - Nicho Peoples MD        ___________________                   Date     ________________________________________________________  Witness Signature (required if provider not present while patient signing)          ___________________                   Date

## 2021-12-08 ASSESSMENT — PATIENT HEALTH QUESTIONNAIRE - PHQ9: SUM OF ALL RESPONSES TO PHQ QUESTIONS 1-9: 1

## 2021-12-08 ASSESSMENT — ANXIETY QUESTIONNAIRES: GAD7 TOTAL SCORE: 1

## 2021-12-11 LAB
7AMINOCLONAZEPAM UR QL CFM: PRESENT
ME-PHENIDATE UR CFM-MCNC: 1660 NG/ML
ME-PHENIDATE UR CFM-MCNC: ABNORMAL NG/ML
ME-PHENIDATE/CREAT UR: 437 NG/MG {CREAT}
ME-PHENIDATE/CREAT UR: ABNORMAL

## 2022-01-23 ENCOUNTER — HEALTH MAINTENANCE LETTER (OUTPATIENT)
Age: 32
End: 2022-01-23

## 2022-03-16 ENCOUNTER — OFFICE VISIT (OUTPATIENT)
Dept: FAMILY MEDICINE | Facility: OTHER | Age: 32
End: 2022-03-16
Attending: FAMILY MEDICINE
Payer: COMMERCIAL

## 2022-03-16 ENCOUNTER — HOSPITAL ENCOUNTER (OUTPATIENT)
Facility: OTHER | Age: 32
Setting detail: OBSERVATION
Discharge: HOME | End: 2022-03-19
Attending: INTERNAL MEDICINE | Admitting: INTERNAL MEDICINE
Payer: COMMERCIAL

## 2022-03-16 VITALS
BODY MASS INDEX: 34.9 KG/M2 | WEIGHT: 197 LBS | SYSTOLIC BLOOD PRESSURE: 132 MMHG | RESPIRATION RATE: 20 BRPM | HEART RATE: 92 BPM | DIASTOLIC BLOOD PRESSURE: 100 MMHG | TEMPERATURE: 98.5 F | OXYGEN SATURATION: 97 %

## 2022-03-16 DIAGNOSIS — R11.2 NAUSEA AND VOMITING, INTRACTABILITY OF VOMITING NOT SPECIFIED, UNSPECIFIED VOMITING TYPE: Primary | ICD-10-CM

## 2022-03-16 DIAGNOSIS — R11.2 INTRACTABLE NAUSEA AND VOMITING: ICD-10-CM

## 2022-03-16 DIAGNOSIS — D50.0 IRON DEFICIENCY ANEMIA DUE TO CHRONIC BLOOD LOSS: Primary | ICD-10-CM

## 2022-03-16 PROBLEM — E87.6 HYPOKALEMIA: Status: ACTIVE | Noted: 2022-03-16

## 2022-03-16 PROBLEM — E86.1 HYPOVOLEMIA: Status: ACTIVE | Noted: 2022-03-16

## 2022-03-16 LAB
ALBUMIN SERPL-MCNC: 4.5 G/DL (ref 3.5–5.7)
ALP SERPL-CCNC: 58 U/L (ref 34–104)
ALT SERPL W P-5'-P-CCNC: 17 U/L (ref 7–52)
AMYLASE SERPL-CCNC: 33 U/L (ref 29–103)
ANION GAP SERPL CALCULATED.3IONS-SCNC: 10 MMOL/L (ref 3–14)
AST SERPL W P-5'-P-CCNC: 19 U/L (ref 13–39)
BASOPHILS # BLD AUTO: 0.1 10E3/UL (ref 0–0.2)
BASOPHILS NFR BLD AUTO: 1 %
BILIRUB SERPL-MCNC: 0.6 MG/DL (ref 0.3–1)
BUN SERPL-MCNC: 9 MG/DL (ref 7–25)
CALCIUM SERPL-MCNC: 9.4 MG/DL (ref 8.6–10.3)
CHLORIDE BLD-SCNC: 105 MMOL/L (ref 98–107)
CHOLEST SERPL-MCNC: 138 MG/DL
CO2 SERPL-SCNC: 22 MMOL/L (ref 21–31)
CREAT SERPL-MCNC: 0.88 MG/DL (ref 0.6–1.2)
CRP SERPL-MCNC: 1.8 MG/L
EOSINOPHIL # BLD AUTO: 0.1 10E3/UL (ref 0–0.7)
EOSINOPHIL NFR BLD AUTO: 1 %
ERYTHROCYTE [DISTWIDTH] IN BLOOD BY AUTOMATED COUNT: 17.8 % (ref 10–15)
FASTING STATUS PATIENT QL REPORTED: NORMAL
FLUAV RNA SPEC QL NAA+PROBE: NEGATIVE
FLUBV RNA RESP QL NAA+PROBE: NEGATIVE
GFR SERPL CREATININE-BSD FRML MDRD: >90 ML/MIN/1.73M2
GLUCOSE BLD-MCNC: 93 MG/DL (ref 70–105)
HCT VFR BLD AUTO: 35.8 % (ref 35–47)
HDLC SERPL-MCNC: 43 MG/DL (ref 23–92)
HGB BLD-MCNC: 11.3 G/DL (ref 11.7–15.7)
IMM GRANULOCYTES # BLD: 0 10E3/UL
IMM GRANULOCYTES NFR BLD: 0 %
LACTATE SERPL-SCNC: 1.1 MMOL/L (ref 0.7–2)
LDLC SERPL CALC-MCNC: 75 MG/DL
LIPASE SERPL-CCNC: 18 U/L (ref 11–82)
LYMPHOCYTES # BLD AUTO: 2.6 10E3/UL (ref 0.8–5.3)
LYMPHOCYTES NFR BLD AUTO: 29 %
MCH RBC QN AUTO: 24.1 PG (ref 26.5–33)
MCHC RBC AUTO-ENTMCNC: 31.6 G/DL (ref 31.5–36.5)
MCV RBC AUTO: 76 FL (ref 78–100)
MONOCYTES # BLD AUTO: 0.6 10E3/UL (ref 0–1.3)
MONOCYTES NFR BLD AUTO: 6 %
NEUTROPHILS # BLD AUTO: 5.8 10E3/UL (ref 1.6–8.3)
NEUTROPHILS NFR BLD AUTO: 63 %
NONHDLC SERPL-MCNC: 95 MG/DL
NRBC # BLD AUTO: 0 10E3/UL
NRBC BLD AUTO-RTO: 0 /100
PLAT MORPH BLD: NORMAL
PLATELET # BLD AUTO: 459 10E3/UL (ref 150–450)
POTASSIUM BLD-SCNC: 3.1 MMOL/L (ref 3.5–5.1)
PROT SERPL-MCNC: 7.8 G/DL (ref 6.4–8.9)
RBC # BLD AUTO: 4.69 10E6/UL (ref 3.8–5.2)
RBC MORPH BLD: NORMAL
RSV RNA SPEC NAA+PROBE: NEGATIVE
SARS-COV-2 RNA RESP QL NAA+PROBE: NEGATIVE
SODIUM SERPL-SCNC: 137 MMOL/L (ref 134–144)
TRIGL SERPL-MCNC: 99 MG/DL
WBC # BLD AUTO: 9.1 10E3/UL (ref 4–11)

## 2022-03-16 PROCEDURE — 80061 LIPID PANEL: CPT | Mod: ZL | Performed by: FAMILY MEDICINE

## 2022-03-16 PROCEDURE — 250N000013 HC RX MED GY IP 250 OP 250 PS 637: Performed by: INTERNAL MEDICINE

## 2022-03-16 PROCEDURE — 99213 OFFICE O/P EST LOW 20 MIN: CPT | Performed by: FAMILY MEDICINE

## 2022-03-16 PROCEDURE — 96375 TX/PRO/DX INJ NEW DRUG ADDON: CPT

## 2022-03-16 PROCEDURE — 99219 PR INITIAL OBSERVATION CARE,LEVEL II: CPT | Performed by: INTERNAL MEDICINE

## 2022-03-16 PROCEDURE — 83605 ASSAY OF LACTIC ACID: CPT | Mod: ZL | Performed by: FAMILY MEDICINE

## 2022-03-16 PROCEDURE — 36415 COLL VENOUS BLD VENIPUNCTURE: CPT | Mod: ZL | Performed by: FAMILY MEDICINE

## 2022-03-16 PROCEDURE — 83690 ASSAY OF LIPASE: CPT | Mod: ZL | Performed by: FAMILY MEDICINE

## 2022-03-16 PROCEDURE — 96374 THER/PROPH/DIAG INJ IV PUSH: CPT

## 2022-03-16 PROCEDURE — 87637 SARSCOV2&INF A&B&RSV AMP PRB: CPT | Mod: ZL | Performed by: FAMILY MEDICINE

## 2022-03-16 PROCEDURE — C9803 HOPD COVID-19 SPEC COLLECT: HCPCS | Performed by: FAMILY MEDICINE

## 2022-03-16 PROCEDURE — 250N000011 HC RX IP 250 OP 636: Performed by: INTERNAL MEDICINE

## 2022-03-16 PROCEDURE — 86140 C-REACTIVE PROTEIN: CPT | Mod: ZL | Performed by: FAMILY MEDICINE

## 2022-03-16 PROCEDURE — 82150 ASSAY OF AMYLASE: CPT | Mod: ZL | Performed by: FAMILY MEDICINE

## 2022-03-16 PROCEDURE — 80053 COMPREHEN METABOLIC PANEL: CPT | Mod: ZL | Performed by: FAMILY MEDICINE

## 2022-03-16 PROCEDURE — 85025 COMPLETE CBC W/AUTO DIFF WBC: CPT | Mod: ZL | Performed by: FAMILY MEDICINE

## 2022-03-16 PROCEDURE — 258N000003 HC RX IP 258 OP 636: Performed by: INTERNAL MEDICINE

## 2022-03-16 PROCEDURE — 120N000001 HC R&B MED SURG/OB

## 2022-03-16 RX ORDER — OMEPRAZOLE 20 MG/1
20 TABLET, DELAYED RELEASE ORAL DAILY
COMMUNITY
Start: 2022-03-16

## 2022-03-16 RX ORDER — ONDANSETRON 4 MG/1
4 TABLET, ORALLY DISINTEGRATING ORAL EVERY 6 HOURS PRN
Status: DISCONTINUED | OUTPATIENT
Start: 2022-03-16 | End: 2022-03-19 | Stop reason: HOSPADM

## 2022-03-16 RX ORDER — CLONAZEPAM 1 MG/1
1 TABLET ORAL 2 TIMES DAILY PRN
Status: DISCONTINUED | OUTPATIENT
Start: 2022-03-16 | End: 2022-03-19 | Stop reason: HOSPADM

## 2022-03-16 RX ORDER — QUETIAPINE FUMARATE 25 MG/1
25-50 TABLET, FILM COATED ORAL AT BEDTIME
Status: DISCONTINUED | OUTPATIENT
Start: 2022-03-16 | End: 2022-03-16

## 2022-03-16 RX ORDER — NALOXONE HYDROCHLORIDE 0.4 MG/ML
0.4 INJECTION, SOLUTION INTRAMUSCULAR; INTRAVENOUS; SUBCUTANEOUS
Status: DISCONTINUED | OUTPATIENT
Start: 2022-03-16 | End: 2022-03-19 | Stop reason: HOSPADM

## 2022-03-16 RX ORDER — SODIUM CHLORIDE 9 MG/ML
INJECTION, SOLUTION INTRAVENOUS CONTINUOUS
Status: DISCONTINUED | OUTPATIENT
Start: 2022-03-16 | End: 2022-03-18

## 2022-03-16 RX ORDER — PROCHLORPERAZINE MALEATE 10 MG
10 TABLET ORAL EVERY 6 HOURS PRN
Status: DISCONTINUED | OUTPATIENT
Start: 2022-03-16 | End: 2022-03-19 | Stop reason: HOSPADM

## 2022-03-16 RX ORDER — MORPHINE SULFATE 2 MG/ML
2 INJECTION, SOLUTION INTRAMUSCULAR; INTRAVENOUS
Status: DISCONTINUED | OUTPATIENT
Start: 2022-03-16 | End: 2022-03-18

## 2022-03-16 RX ORDER — ESCITALOPRAM OXALATE 10 MG/1
20 TABLET ORAL DAILY
Status: DISCONTINUED | OUTPATIENT
Start: 2022-03-17 | End: 2022-03-19 | Stop reason: HOSPADM

## 2022-03-16 RX ORDER — MORPHINE SULFATE 2 MG/ML
2 INJECTION, SOLUTION INTRAMUSCULAR; INTRAVENOUS
Status: DISCONTINUED | OUTPATIENT
Start: 2022-03-16 | End: 2022-03-16

## 2022-03-16 RX ORDER — NALOXONE HYDROCHLORIDE 0.4 MG/ML
0.2 INJECTION, SOLUTION INTRAMUSCULAR; INTRAVENOUS; SUBCUTANEOUS
Status: DISCONTINUED | OUTPATIENT
Start: 2022-03-16 | End: 2022-03-19 | Stop reason: HOSPADM

## 2022-03-16 RX ORDER — ONDANSETRON 4 MG/1
4 TABLET, ORALLY DISINTEGRATING ORAL EVERY 8 HOURS PRN
Qty: 30 TABLET | Refills: 4 | Status: SHIPPED | OUTPATIENT
Start: 2022-03-16 | End: 2024-02-05

## 2022-03-16 RX ORDER — QUETIAPINE FUMARATE 25 MG/1
25 TABLET, FILM COATED ORAL AT BEDTIME
Status: DISCONTINUED | OUTPATIENT
Start: 2022-03-16 | End: 2022-03-19 | Stop reason: HOSPADM

## 2022-03-16 RX ORDER — ONDANSETRON 2 MG/ML
4 INJECTION INTRAMUSCULAR; INTRAVENOUS EVERY 6 HOURS PRN
Status: DISCONTINUED | OUTPATIENT
Start: 2022-03-16 | End: 2022-03-19 | Stop reason: HOSPADM

## 2022-03-16 RX ORDER — NAPROXEN 500 MG/1
500 TABLET ORAL 2 TIMES DAILY PRN
COMMUNITY

## 2022-03-16 RX ORDER — LIDOCAINE 40 MG/G
CREAM TOPICAL
Status: DISCONTINUED | OUTPATIENT
Start: 2022-03-16 | End: 2022-03-19 | Stop reason: HOSPADM

## 2022-03-16 RX ORDER — PROCHLORPERAZINE 25 MG
25 SUPPOSITORY, RECTAL RECTAL EVERY 12 HOURS PRN
Status: DISCONTINUED | OUTPATIENT
Start: 2022-03-16 | End: 2022-03-19 | Stop reason: HOSPADM

## 2022-03-16 RX ORDER — POTASSIUM CHLORIDE 7.45 MG/ML
10 INJECTION INTRAVENOUS
Status: COMPLETED | OUTPATIENT
Start: 2022-03-16 | End: 2022-03-16

## 2022-03-16 RX ORDER — FAMOTIDINE 20 MG/1
20 TABLET, FILM COATED ORAL 2 TIMES DAILY
Status: DISCONTINUED | OUTPATIENT
Start: 2022-03-16 | End: 2022-03-19 | Stop reason: HOSPADM

## 2022-03-16 RX ORDER — KETOROLAC TROMETHAMINE 15 MG/ML
15 INJECTION, SOLUTION INTRAMUSCULAR; INTRAVENOUS EVERY 6 HOURS PRN
Status: DISCONTINUED | OUTPATIENT
Start: 2022-03-16 | End: 2022-03-18

## 2022-03-16 RX ADMIN — MORPHINE SULFATE 2 MG: 2 INJECTION, SOLUTION INTRAMUSCULAR; INTRAVENOUS at 18:41

## 2022-03-16 RX ADMIN — QUETIAPINE FUMARATE 25 MG: 25 TABLET ORAL at 22:41

## 2022-03-16 RX ADMIN — FAMOTIDINE 20 MG: 20 TABLET ORAL at 22:41

## 2022-03-16 RX ADMIN — ONDANSETRON 4 MG: 2 INJECTION INTRAMUSCULAR; INTRAVENOUS at 22:38

## 2022-03-16 RX ADMIN — POTASSIUM CHLORIDE 10 MEQ: 7.46 INJECTION, SOLUTION INTRAVENOUS at 18:44

## 2022-03-16 RX ADMIN — POTASSIUM CHLORIDE 10 MEQ: 7.46 INJECTION, SOLUTION INTRAVENOUS at 20:35

## 2022-03-16 RX ADMIN — POTASSIUM CHLORIDE 10 MEQ: 7.46 INJECTION, SOLUTION INTRAVENOUS at 22:41

## 2022-03-16 RX ADMIN — POTASSIUM CHLORIDE 10 MEQ: 7.46 INJECTION, SOLUTION INTRAVENOUS at 21:40

## 2022-03-16 RX ADMIN — SODIUM CHLORIDE: 9 INJECTION, SOLUTION INTRAVENOUS at 18:18

## 2022-03-16 ASSESSMENT — ACTIVITIES OF DAILY LIVING (ADL)
ADLS_ACUITY_SCORE: 3

## 2022-03-16 ASSESSMENT — PAIN SCALES - GENERAL: PAINLEVEL: SEVERE PAIN (6)

## 2022-03-16 NOTE — H&P
[facility]    History and Physical - Hospitalist Service       Date of Admission:  3/16/2022    Assessment & Plan      Tia Sethi is a 31 year old female admitted on 3/16/2022. She presents with intractable nausea, vomiting.     Principal Problem:    Intractable nausea and vomiting  Assessment: tachycardia, hypokalemia, obvious dehydration.   Plan: Admit, aggressive intravenous fluid replacement, IV antiemetics, clear liquid diet.     Active Problems:    Anxiety  Assessment: chronic  Plan: clonazepam as needed      Hypokalemia  Assessment: present on admission, secondary to vomiting  Plan: replace      Hypovolemia  Assessment: present on admission   Plan: intravenous fluids        Diet: Clear Liquid Diet    DVT Prophylaxis: Pneumatic Compression Devices  Yen Catheter: Not present  Central Lines: None  Cardiac Monitoring: None  Code Status: Full Code        Disposition Plan   Expected Discharge:  1-2 days   Anticipated discharge location:  Awaiting care coordination huddle  Delays:          The patient's care was discussed with the Patient and spouse.    Conner Holland MD  Hospitalist Service  [facility]  Securely message with the Vocera Web Console (learn more here)  Text page via [facility] Paging/Directory         ______________________________________________________________________    Chief Complaint   Nausea, vomiting     History is obtained from the patient    History of Present Illness   Tia Sethi is a 31 year old female who presents to the clinic today with persistent intractable nausea and vomiting.  She had vomited 18 times today.  She was positive for Covid in early February and had primarily GI symptoms at that time.  Since then she has had nausea and vomiting consistently.    She has a history of GI symptoms.  She had H. pylori gastritis, she has had a peptic ulcer and numerous endoscopies.  She is not pregnant.  She does not use THC.  She is  not diabetic.  She has been passing gas and having stool but minimal output.  She complains of some mild epigastric discomfort.  No hematemesis or hematochezia.    Review of Systems    CONSTITUTIONAL:POSITIVE  for chills and rigors  INTEGUMENTARY/SKIN: NEGATIVE for worrisome rashes, moles or lesions  EYES: NEGATIVE for vision changes or irritation  ENT/MOUTH: NEGATIVE for ear, mouth and throat problems  RESP: NEGATIVE for significant cough or SOB  CV: NEGATIVE for chest pain, palpitations or peripheral edema  GI: POSITIVE for abdominal pain epigastric, nausea and vomiting  : NEGATIVE for frequency, dysuria, or hematuria  MUSCULOSKELETAL: NEGATIVE for significant arthralgias or myalgia  NEURO: POSITIVE for weakness   ENDOCRINE: NEGATIVE for temperature intolerance, skin/hair changes  HEME: NEGATIVE for bleeding problems  PSYCHIATRIC: NEGATIVE for changes in mood or affect    Past Medical History    I have reviewed this patient's medical history and updated it with pertinent information if needed.   Past Medical History:   Diagnosis Date     Helicobacter pylori infection     No Comments Provided       Past Surgical History   I have reviewed this patient's surgical history and updated it with pertinent information if needed.  Past Surgical History:   Procedure Laterality Date     exision mass retrorectal and coccyx  06/09/2017    Ori Espinoza MD at [facility]       Social History   I have reviewed this patient's social history and updated it with pertinent information if needed.  Social History     Tobacco Use     Smoking status: Never Smoker     Smokeless tobacco: Never Used   Vaping Use     Vaping Use: Never used   Substance Use Topics     Alcohol use: Not Currently     Alcohol/week: 4.2 standard drinks     Drug use: No       Family History   I have reviewed this patient's family history and updated it with pertinent information if needed.  Family History   Problem Relation Age of Onset     Cancer Father          Cancer, h/o testicular CA     Heart Disease Maternal Grandmother         Heart Disease,cardiomyopathy     Blood Disease Maternal Grandmother         Blood Disease,blood dyscrasia       Prior to Admission Medications   Prior to Admission Medications   Prescriptions Last Dose Informant Patient Reported? Taking?   QUEtiapine (SEROQUEL) 25 MG tablet   No No   Sig: Take 1-2 tablets (25-50 mg) by mouth At Bedtime   clonazePAM (KLONOPIN) 1 MG tablet   No No   Sig: TAKE 1/2 TO 1 TABLET BY MOUTH TWICE DAILY AS NEEDED FOR ANXIETY   escitalopram (LEXAPRO) 20 MG tablet   No No   Sig: Take 1 tablet (20 mg) by mouth daily   methylphenidate (RITALIN LA) 40 MG 24 hr capsule   Yes No   naproxen (NAPROSYN) 500 MG tablet   Yes No   Sig: Take 500 mg by mouth 2 times daily (with meals)   omeprazole (PRILOSEC OTC) 20 MG EC tablet   Yes No   Sig: Take 1 tablet (20 mg) by mouth daily   ondansetron (ZOFRAN-ODT) 4 MG ODT tab   No No   Sig: Take 1 tablet (4 mg) by mouth every 8 hours as needed for nausea   triamcinolone (KENALOG) 0.1 % external cream   No No   Sig: Apply topically 3 times daily Apply  topically to affected area(s) 3 times daily.      Facility-Administered Medications: None     Allergies   Allergies   Allergen Reactions     Other  [No Clinical Screening - See Comments]      Other reaction(s): Throat Irritation  Carmex, Pine, Icy Hot     [facility] Cleansing Skin      Other reaction(s): Swelling     Menthol      Other reaction(s): Swelling     Pine Tar      Other reaction(s): Runny Nose       Physical Exam   Vital Signs: Temp: 98.9  F (37.2  C) Temp src: Tympanic BP: 134/82 Pulse: 74   Resp: 16 SpO2: 96 % O2 Device: None (Room air)    Weight: 0 lbs 0 oz    GENERAL: Comfortable, talkative, in no apparent distress.  HEENT: Anicteric, non-injected sclera, mouth moist.   NECK: No JVD.  CARDIOVASCULAR: regular rate and rhythm, no murmur. No lower extremity edema   RESPIRATORY: Clear to auscultation bilaterally, no wheezes, no  crackles.  GI: Non-distended, normal bowel sounds, soft, non-tender.  SKIN: No rashes, sores.   NEUROLOGY: Alert and oriented x3, follows commands, speech and language normal.       Data   Data reviewed today: I reviewed all medications, new labs and imaging results over the last 24 hours. I personally reviewed no images or EKG's today.    Recent Labs   Lab 03/16/22  1503   WBC 9.1   HGB 11.3*   MCV 76*   *      POTASSIUM 3.1*   CHLORIDE 105   CO2 22   BUN 9   CR 0.88   ANIONGAP 10   PRASANNA 9.4   GLC 93   ALBUMIN 4.5   PROTTOTAL 7.8   BILITOTAL 0.6   ALKPHOS 58   ALT 17   AST 19   LIPASE 18

## 2022-03-16 NOTE — PROGRESS NOTES
[facility] ADMISSION NOTE    Patient admitted to room 313 at approximately 1806 via ambulation from clinic. Patient was accompanied by spouse.     Verbal SBAR report received from [place] prior to patient arrival.     Patient ambulated to bed independently. Patient alert and oriented X 3. Pain is controlled without any medications.  . Admission vital signs: Blood pressure 134/82, pulse 74, temperature 98.9  F (37.2  C), temperature source Tympanic, resp. rate 16, last menstrual period 03/11/2022, SpO2 96 %, not currently breastfeeding. Patient was oriented to plan of care, call light, bed controls, tv, telephone, bathroom and visiting hours.     Gurdeep Knapp RN

## 2022-03-16 NOTE — NURSING NOTE
Chief Complaint   Patient presents with     Abdominal Pain       Medication Reconciliation: complete       Megha Mosqueda LPN........................3/16/2022  2:20 PM

## 2022-03-16 NOTE — PROVIDER NOTIFICATION
03/16/22 1655   Valuables   Patient Belongings remains with patient;sent home   Patient Belongings Sent Home purse/wallet   Patient Belongings Remaining with Patient cell phone/electronics;clothing;ring;shoes   Did you bring any home meds/supplements to the hospital?  No     [facility] will make every effort per our policy to help keep your items safe while in the hospital.  If you choose to keep any items at the bedside, we cannot be held responsible for any items that are lost or broken.      List items sent to safe: none, remain in room with patient.    I have reviewed my belongings list on admission and verify that it is correct.     Patient signature_______________________________      I have received all my belongings noted above at discharge.    Patient signature________________________________

## 2022-03-16 NOTE — PROGRESS NOTES
Nursing Notes:   Megha Mosqueda LPN  3/16/2022  2:42 PM  Signed  Chief Complaint   Patient presents with     Abdominal Pain       Medication Reconciliation: complete       Megha Mosqueda LPN........................3/16/2022  2:20 PM      SUBJECTIVE:  Tia Sethi  is a 31 year old female who comes in because of stomach issues. She has been vomiting intractably. She has had 2 days since February 7 that she has not puked.  She will keep small amounts of fluid in at times. Today she has vomited 18 times with small amounts. Some loose stools.     She has had some sweats and chills.  She has had some vasomotor instability. She is not pregnant.  She had a history of H. Pylori gastritis in the past. She had an ulcer in the past. She is taking omeprazole 20 mg daily for a long time. No blood in the emesis. She has had EGD in the past.      Past Medical, Family, and Social History reviewed and updated as noted below.   ROS is negative except as noted above       Allergies   Allergen Reactions     Other  [No Clinical Screening - See Comments]      Other reaction(s): Throat Irritation  Carmex, Pine, Icy Hot     [facility] Cleansing Skin      Other reaction(s): Swelling     Menthol      Other reaction(s): Swelling     Pine Tar      Other reaction(s): Runny Nose   ,   Family History   Problem Relation Age of Onset     Cancer Father         Cancer, h/o testicular CA     Heart Disease Maternal Grandmother         Heart Disease,cardiomyopathy     Blood Disease Maternal Grandmother         Blood Disease,blood dyscrasia   ,   No current outpatient medications on file.     No current facility-administered medications for this visit.   ,   Past Medical History:   Diagnosis Date     Helicobacter pylori infection     No Comments Provided   ,   Patient Active Problem List    Diagnosis Date Noted     Anxiety 02/05/2019     Priority: Medium     Controlled substance agreement signed 2/20/18, 7/2/19, 10/26/2020 02/20/2018     Priority:  Medium     Allergic rhinitis due to pollen 01/30/2018     Priority: Medium     Overview:   Spring and fall       Contact dermatitis and eczema 01/30/2018     Priority: Medium     Overview:   Left ear       Menorrhagia with irregular cycle 01/30/2018     Priority: Medium     Chronic ulcer of sacral region with fat layer exposed (H) 09/26/2017     Priority: Medium     Other granulomatous disorders of the skin and subcutaneous tissue 09/26/2017     Priority: Medium     Mass of perirectal soft tissue 04/12/2017     Priority: Medium     Screening for malignant neoplasm of cervix 03/17/2016     Priority: Medium     Overview:   2016 NILM  PLAN: PAP TEST 3/2019  ; PAP TEST HISTORY       ADHD 09/24/2010     Priority: Medium     Overview:   Evaluation from [facility] Counselling done.     ,   Past Surgical History:   Procedure Laterality Date     exision mass retrorectal and coccyx  06/09/2017    Ori Espinoza MD at [facility]    and   Social History     Tobacco Use     Smoking status: Never Smoker     Smokeless tobacco: Never Used   Substance Use Topics     Alcohol use: Not Currently     Alcohol/week: 4.2 standard drinks     OBJECTIVE:  BP (!) 132/100 (BP Location: Right arm, Patient Position: Sitting, Cuff Size: Adult Large)   Pulse 92   Temp 98.5  F (36.9  C) (Tympanic)   Resp 20   Wt 89.4 kg (197 lb)   LMP 03/11/2022 (Exact Date)   SpO2 97%   Breastfeeding No   BMI 34.90 kg/m     EXAM:  Does not appear to feel well.  When I walked in the room she was kneeling on the floor by the sink and had just thrown up into an emesis bag.  She is pale and somewhat diaphoretic after throwing up.  Mucous membranes are slightly dry.  Neck is supple without adenopathy and throat is clear.  Lungs are clear, no rales or wheezes are heard.  Cardiac RRR without murmur.  Abdomen is soft with tenderness to palpation epigastrium and mid abdomen but no rebound guarding or peritoneal signs.    Results for orders placed or performed in  visit on 03/16/22   Lactic acid whole blood     Status: Normal   Result Value Ref Range    Lactic Acid 1.1 0.7 - 2.0 mmol/L   CRP inflammation     Status: Normal   Result Value Ref Range    CRP Inflammation 1.8 <10.0 mg/L   Lipase     Status: Normal   Result Value Ref Range    Lipase 18 11 - 82 U/L   Amylase     Status: Normal   Result Value Ref Range    Amylase 33 29 - 103 U/L   Comprehensive metabolic panel     Status: Abnormal   Result Value Ref Range    Sodium 137 134 - 144 mmol/L    Potassium 3.1 (L) 3.5 - 5.1 mmol/L    Chloride 105 98 - 107 mmol/L    Carbon Dioxide (CO2) 22 21 - 31 mmol/L    Anion Gap 10 3 - 14 mmol/L    Urea Nitrogen 9 7 - 25 mg/dL    Creatinine 0.88 0.60 - 1.20 mg/dL    Calcium 9.4 8.6 - 10.3 mg/dL    Glucose 93 70 - 105 mg/dL    Alkaline Phosphatase 58 34 - 104 U/L    AST 19 13 - 39 U/L    ALT 17 7 - 52 U/L    Protein Total 7.8 6.4 - 8.9 g/dL    Albumin 4.5 3.5 - 5.7 g/dL    Bilirubin Total 0.6 0.3 - 1.0 mg/dL    GFR Estimate >90 >60 mL/min/1.73m2   Lipid Profile     Status: None   Result Value Ref Range    Cholesterol 138 <200 mg/dL    Triglycerides 99 <150 mg/dL    Direct Measure HDL 43 23 - 92 mg/dL    LDL Cholesterol Calculated 75 <=100 mg/dL    Non HDL Cholesterol 95 <130 mg/dL    Patient Fasting > 8hrs? Unknown     Narrative    Cholesterol  Desirable:  <200 mg/dL    Triglycerides  Normal:  Less than 150 mg/dL  Borderline High:  150-199 mg/dL  High:  200-499 mg/dL  Very High:  Greater than or equal to 500 mg/dL    Direct Measure HDL  Female:  Greater than or equal to 50 mg/dL   Male:  Greater than or equal to 40 mg/dL    LDL Cholesterol  Desirable:  <100mg/dL  Above Desirable:  100-129 mg/dL   Borderline High:  130-159 mg/dL   High:  160-189 mg/dL   Very High:  >= 190 mg/dL    Non HDL Cholesterol  Desirable:  130 mg/dL  Above Desirable:  130-159 mg/dL  Borderline High:  160-189 mg/dL  High:  190-219 mg/dL  Very High:  Greater than or equal to 220 mg/dL   CBC with platelets and  differential     Status: Abnormal   Result Value Ref Range    WBC Count 9.1 4.0 - 11.0 10e3/uL    RBC Count 4.69 3.80 - 5.20 10e6/uL    Hemoglobin 11.3 (L) 11.7 - 15.7 g/dL    Hematocrit 35.8 35.0 - 47.0 %    MCV 76 (L) 78 - 100 fL    MCH 24.1 (L) 26.5 - 33.0 pg    MCHC 31.6 31.5 - 36.5 g/dL    RDW 17.8 (H) 10.0 - 15.0 %    Platelet Count 459 (H) 150 - 450 10e3/uL    % Neutrophils 63 %    % Lymphocytes 29 %    % Monocytes 6 %    % Eosinophils 1 %    % Basophils 1 %    % Immature Granulocytes 0 %    NRBCs per 100 WBC 0 <1 /100    Absolute Neutrophils 5.8 1.6 - 8.3 10e3/uL    Absolute Lymphocytes 2.6 0.8 - 5.3 10e3/uL    Absolute Monocytes 0.6 0.0 - 1.3 10e3/uL    Absolute Eosinophils 0.1 0.0 - 0.7 10e3/uL    Absolute Basophils 0.1 0.0 - 0.2 10e3/uL    Absolute Immature Granulocytes 0.0 <=0.4 10e3/uL    Absolute NRBCs 0.0 10e3/uL   RBC and Platelet Morphology     Status: None   Result Value Ref Range    Platelet Assessment  Automated Count Confirmed. Platelet morphology is normal.     Automated Count Confirmed. Platelet morphology is normal.    RBC Morphology Confirmed RBC Indices    CBC with Platelets & Differential     Status: Abnormal    Narrative    The following orders were created for panel order CBC with Platelets & Differential.  Procedure                               Abnormality         Status                     ---------                               -----------         ------                     CBC with platelets and d...[[id number]]  Abnormal            Final result               RBC and Platelet Morphology[[id number]]                      Final result                 Please view results for these tests on the individual orders.      ASSESSMENT/Plan :    Tia was seen today for abdominal pain.    Diagnoses and all orders for this visit:    Nausea and vomiting, intractability of vomiting not specified, unspecified vomiting type  -     ondansetron (ZOFRAN-ODT) 4 MG ODT tab; Take 1 tablet (4 mg) by mouth  every 8 hours as needed for nausea  -     CBC with Platelets & Differential; Future  -     Comprehensive metabolic panel; Future  -     Lipid Profile; Future  -     Amylase; Future  -     Lipase; Future  -     CRP inflammation; Future  -     Lactic acid whole blood; Future  -     Lactic acid whole blood  -     CRP inflammation  -     Lipase  -     Amylase  -     Comprehensive metabolic panel  -     Lipid Profile  -     CBC with Platelets & Differential  -     Symptomatic; Unknown Influenza A/B & SARS-CoV2 (COVID-19) Virus PCR Multiplex Nose      Labs are relatively reassuring.  I think she would benefit from being n.p.o. receiving antiemetics and PPI IV and getting rehydrated.  She is put up with this for a month with his waxing and waning course and has been optimistic that she was going get better but it seems that it continues to drag out.  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  I spoke to Dr. Holland from hospital service who will take over her cares.      Nicho Peoples MD            Answers for HPI/ROS submitted by the patient on 3/16/2022  How many servings of fruits and vegetables do you eat daily?: 2-3  On average, how many sweetened beverages do you drink each day (Examples: soda, juice, sweet tea, etc.  Do NOT count diet or artificially sweetened beverages)?: 0  How many minutes a day do you exercise enough to make your heart beat faster?: 20 to 29  How many days a week do you exercise enough to make your heart beat faster?: 3 or less  How many days per week do you miss taking your medication?: 0  What is the reason for your visit today?: Stomach issues, keep puking, shaking, no eating, no liquids, sweats/chills, headache  When did your symptoms begin?: 1-2 weeks ago  What are your symptoms?: Stomach issues bad puking  How would you describe these symptoms?: Severe  Are your symptoms:: Worsening  Have you had  these symptoms before?: Yes  Have you tried or received treatment for these symptoms before?: Yes  Did that treatment work? : No  Is there anything that makes you feel worse?: Stress, mornings, puking pains  Is there anything that makes you feel better?: Wash clothes, tea when keep it down

## 2022-03-16 NOTE — LETTER
[facility]  [address]  [place] [address]  Phone: [phone]  Fax: [phone]    March 19, 2022        Tia Sethi  [address]   [place] [address]          To whom it may concern:    RE: Tia Sethi    I have seen and evaluated Tia Sethi from 3/16/22 to 3/19/22 at [facility].  After ongoing evaluation and treatment I feel that she is able to return to work after reevaluation in clinic on 3/23/22.       Sincerely,        Darryn De Guzman MD

## 2022-03-17 LAB
ALBUMIN SERPL-MCNC: 3.6 G/DL (ref 3.5–5.7)
ALP SERPL-CCNC: 50 U/L (ref 34–104)
ALT SERPL W P-5'-P-CCNC: 14 U/L (ref 7–52)
ANION GAP SERPL CALCULATED.3IONS-SCNC: 6 MMOL/L (ref 3–14)
AST SERPL W P-5'-P-CCNC: 16 U/L (ref 13–39)
BILIRUB SERPL-MCNC: 0.6 MG/DL (ref 0.3–1)
BUN SERPL-MCNC: 9 MG/DL (ref 7–25)
CALCIUM SERPL-MCNC: 8.7 MG/DL (ref 8.6–10.3)
CHLORIDE BLD-SCNC: 108 MMOL/L (ref 98–107)
CO2 SERPL-SCNC: 25 MMOL/L (ref 21–31)
CREAT SERPL-MCNC: 0.89 MG/DL (ref 0.6–1.2)
ERYTHROCYTE [DISTWIDTH] IN BLOOD BY AUTOMATED COUNT: 17.7 % (ref 10–15)
GFR SERPL CREATININE-BSD FRML MDRD: 90 ML/MIN/1.73M2
GLUCOSE BLD-MCNC: 91 MG/DL (ref 70–105)
HCT VFR BLD AUTO: 30.6 % (ref 35–47)
HGB BLD-MCNC: 9.4 G/DL (ref 11.7–15.7)
MAGNESIUM SERPL-MCNC: 1.9 MG/DL (ref 1.9–2.7)
MCH RBC QN AUTO: 23.9 PG (ref 26.5–33)
MCHC RBC AUTO-ENTMCNC: 30.7 G/DL (ref 31.5–36.5)
MCV RBC AUTO: 78 FL (ref 78–100)
PLATELET # BLD AUTO: 381 10E3/UL (ref 150–450)
POTASSIUM BLD-SCNC: 3.6 MMOL/L (ref 3.5–5.1)
POTASSIUM BLD-SCNC: 4 MMOL/L (ref 3.5–5.1)
PROT SERPL-MCNC: 6.4 G/DL (ref 6.4–8.9)
RBC # BLD AUTO: 3.94 10E6/UL (ref 3.8–5.2)
SODIUM SERPL-SCNC: 139 MMOL/L (ref 134–144)
WBC # BLD AUTO: 7 10E3/UL (ref 4–11)

## 2022-03-17 PROCEDURE — 36415 COLL VENOUS BLD VENIPUNCTURE: CPT | Performed by: INTERNAL MEDICINE

## 2022-03-17 PROCEDURE — 85014 HEMATOCRIT: CPT | Performed by: INTERNAL MEDICINE

## 2022-03-17 PROCEDURE — 99225 PR SUBSEQUENT OBSERVATION CARE,LEVEL II: CPT | Performed by: INTERNAL MEDICINE

## 2022-03-17 PROCEDURE — 83735 ASSAY OF MAGNESIUM: CPT | Performed by: INTERNAL MEDICINE

## 2022-03-17 PROCEDURE — 96376 TX/PRO/DX INJ SAME DRUG ADON: CPT

## 2022-03-17 PROCEDURE — 96375 TX/PRO/DX INJ NEW DRUG ADDON: CPT

## 2022-03-17 PROCEDURE — 84132 ASSAY OF SERUM POTASSIUM: CPT | Performed by: INTERNAL MEDICINE

## 2022-03-17 PROCEDURE — 258N000003 HC RX IP 258 OP 636: Performed by: INTERNAL MEDICINE

## 2022-03-17 PROCEDURE — G0378 HOSPITAL OBSERVATION PER HR: HCPCS

## 2022-03-17 PROCEDURE — 250N000011 HC RX IP 250 OP 636: Performed by: INTERNAL MEDICINE

## 2022-03-17 PROCEDURE — 250N000013 HC RX MED GY IP 250 OP 250 PS 637: Performed by: INTERNAL MEDICINE

## 2022-03-17 RX ADMIN — SODIUM CHLORIDE: 9 INJECTION, SOLUTION INTRAVENOUS at 11:57

## 2022-03-17 RX ADMIN — CLONAZEPAM 1 MG: 1 TABLET ORAL at 17:24

## 2022-03-17 RX ADMIN — ONDANSETRON 4 MG: 2 INJECTION INTRAMUSCULAR; INTRAVENOUS at 09:35

## 2022-03-17 RX ADMIN — SODIUM CHLORIDE: 9 INJECTION, SOLUTION INTRAVENOUS at 03:49

## 2022-03-17 RX ADMIN — FAMOTIDINE 20 MG: 20 TABLET ORAL at 21:11

## 2022-03-17 RX ADMIN — KETOROLAC TROMETHAMINE 15 MG: 15 INJECTION, SOLUTION INTRAMUSCULAR; INTRAVENOUS at 15:02

## 2022-03-17 RX ADMIN — QUETIAPINE FUMARATE 25 MG: 25 TABLET ORAL at 21:11

## 2022-03-17 RX ADMIN — FAMOTIDINE 20 MG: 20 TABLET ORAL at 09:41

## 2022-03-17 RX ADMIN — SODIUM CHLORIDE: 9 INJECTION, SOLUTION INTRAVENOUS at 21:11

## 2022-03-17 RX ADMIN — ESCITALOPRAM OXALATE 20 MG: 10 TABLET ORAL at 09:41

## 2022-03-17 ASSESSMENT — ACTIVITIES OF DAILY LIVING (ADL)
ADLS_ACUITY_SCORE: 3

## 2022-03-17 NOTE — PLAN OF CARE
Goal Outcome Evaluation:      Patient alert and orientated x4. Complains of midline abdominal pain, medicated with Morphine per MAR. BS x 4 quadrants. Some mild dry heaving this shift, applied cold compress and turned lights down. Gurdeep Knapp RN on 3/16/2022 at 7:32 PM

## 2022-03-17 NOTE — PHARMACY-ADMISSION MEDICATION HISTORY
Pharmacy -- Admission Medication Reconciliation    Prior to admission (PTA) medications were reviewed and the patient's PTA medication list was updated.    Sources Consulted: patient, sure scripts, chart review    The reliability of this Medication Reconciliation is: Reliability: Reliable    The following significant changes were made:  Prenatal gummies ADDED  Naproxen changed to PRN    In addition, the patient's allergies were reviewed with the patient and updated as follows:   Allergies: Other  [no clinical screening - see comments], Cvs cleansing skin, Menthol, and Hardy tar    The pharmacist has reviewed with the patient that all personal medications should be removed from the building or locked in the belongings safe.  Patient shall only take medications ordered by the physician and administered by the nursing staff.       Medication barriers identified: patient has been unable to keep medications down due to vomiting   Medication adherence concerns: as above   Understanding of emergency medications: has ondansetron that helps some    Dara Damon [facility], 3/16/2022,  7:16 PM

## 2022-03-17 NOTE — UTILIZATION REVIEW
"    Admission Status; Secondary Review Determination         Under the authority of the Utilization Management Committee, the utilization review process indicated a secondary review on the above patient.  The review outcome is based on review of the medical records, discussions with staff, and applying clinical experience noted on the date of the review.        ()      Inpatient Status Appropriate - This patient's medical care is consistent with medical management for inpatient care and reasonable inpatient medical practice.      (X) Observation Status Appropriate - This patient does not meet hospital inpatient criteria and is placed in observation status. If this patient's primary payer is Medicare and was admitted as an inpatient, Condition Code 44 should be used and patient status changed to \"observation\".   () Admission Status NOT Appropriate - This patient's medical care is not consistent with medical management for Inpatient or Observation Status.          RATIONALE FOR DETERMINATION     \"Tia Sethi is a 31 year old female who presents to the clinic today with persistent intractable nausea and vomiting.  She had vomited 18 times today.  She was positive for Covid in early February and had primarily GI symptoms at that time.  Since then she has had nausea and vomiting consistently.     She has a history of GI symptoms.  She had H. pylori gastritis, she has had a peptic ulcer and numerous endoscopies.  She is not pregnant.  She does not use THC.  She is not diabetic.  She has been passing gas and having stool but minimal output.  She complains of some mild epigastric discomfort.  No hematemesis or hematochezia.\"    Pt is on IVF's and is hemodynamically stable. Pt is not requiring frequent IV meds. Observation status is appropriate.    The severity of illness, intensity of service provided, expected LOS make it appropriate for hospital observation.        The information on this document is developed by " the utilization review team in order for the business office to ensure compliance.  This only denotes the appropriateness of proper admission status and does not reflect the quality of care rendered.         The definitions of Inpatient Status and Observation Status used in making the determination above are those provided in the CMS Coverage Manual, Chapter 1 and Chapter 6, section 70.4.      Sincerely,     JOSE SU MD    Physician Advisor  Utilization Review/ Case Management  [facility].

## 2022-03-17 NOTE — PLAN OF CARE
Patient reports headache and abdominal pain. PRN Toradol and Klonopin administered. Pain improving. Intermittent nausea; no interventions needed. Tolerating clears. VSS.

## 2022-03-17 NOTE — PROGRESS NOTES
[facility]    Medicine Progress Note - Hospitalist Service    Date of Admission:  3/16/2022    Assessment & Plan          Tia Sethi is a 31 year old female admitted on 3/16/2022. She presents with intractable nausea, vomiting.     Principal Problem:    Intractable nausea and vomiting  Assessment: tachycardia, hypokalemia, obvious dehydration. Improved symptoms thus far today but only clear liquids. Given hemoglobin drop (dilutional but low nonetheless) I think it prudent to discuss EGD. Patient would like to avoid this today and see if she improves with intravenous fluids and bowel rest.    Plan: Continue aggressive intravenous fluid replacement, IV antiemetics, clear liquid diet. If not better discuss with general surgery EGD for tomorrow.     Active Problems:    Anxiety  Assessment: chronic  Plan: clonazepam as needed      Hypokalemia  Assessment: present on admission, secondary to vomiting  Plan: replaced      Hypovolemia  Assessment: present on admission   Plan: intravenous fluids          Diet: Clear Liquid Diet    DVT Prophylaxis: Pneumatic Compression Devices  Yen Catheter: Not present  Central Lines: None  Cardiac Monitoring: None  Code Status: Full Code      Disposition Plan   Expected Discharge:  1-2 days   Anticipated discharge location:  Awaiting care coordination huddle  Delays:          The patient's care was discussed with the Patient.    Conner Holland MD  Hospitalist Service  [facility]  Securely message with the Vocera Web Console (learn more here)  Text page via [employer] Paging/Directory       Interval History   Mild nausea. No vomiting. No fevers, chills. No dyspnea. No abdomen pain.    Data reviewed today: I reviewed all medications, new labs and imaging results over the last 24 hours. I personally reviewed no images or EKG's today.    Physical Exam   Vital Signs: Temp: 96.8  F (36  C) Temp src: Tympanic BP: 107/72 Pulse: 66   Resp: 16 SpO2:  96 % O2 Device: None (Room air)    Weight: 197 lbs 0 oz  GENERAL: Comfortable, no apparent distress.  CARDIOVASCULAR: regular rate and rhythm, no murmur. No lower extremity edema   RESPIRATORY: Clear to auscultation bilaterally, no wheezes or crackles.  GI: non-tender, non-distended, normal bowel sounds.   SKIN: warm periphery, no rashes      Data   Recent Labs   Lab 03/17/22  0549 03/17/22  0103 03/16/22  1503   WBC 7.0  --  9.1   HGB 9.4*  --  11.3*   MCV 78  --  76*     --  459*     --  137   POTASSIUM 4.0 3.6 3.1*   CHLORIDE 108*  --  105   CO2 25  --  22   BUN 9  --  9   CR 0.89  --  0.88   ANIONGAP 6  --  10   PRASANNA 8.7  --  9.4   GLC 91  --  93   ALBUMIN 3.6  --  4.5   PROTTOTAL 6.4  --  7.8   BILITOTAL 0.6  --  0.6   ALKPHOS 50  --  58   ALT 14  --  17   AST 16  --  19   LIPASE  --   --  18     No results found for this or any previous visit (from the past 24 hour(s)).  Medications     sodium chloride 125 mL/hr at 03/17/22 0349       escitalopram  20 mg Oral Daily     famotidine  20 mg Oral BID     QUEtiapine  25 mg Oral At Bedtime     sodium chloride (PF)  3 mL Intracatheter Q8H

## 2022-03-17 NOTE — PLAN OF CARE
Patient c/o intermittent nausea. No emesis. Zofran administered x1. Tolerating oral medications and small amounts of fluids. Denies pain. SBA in room.  at bedside.

## 2022-03-18 ENCOUNTER — ANESTHESIA (OUTPATIENT)
Dept: SURGERY | Facility: OTHER | Age: 32
End: 2022-03-18
Payer: COMMERCIAL

## 2022-03-18 ENCOUNTER — TELEPHONE (OUTPATIENT)
Dept: FAMILY MEDICINE | Facility: OTHER | Age: 32
End: 2022-03-18
Payer: COMMERCIAL

## 2022-03-18 ENCOUNTER — ANESTHESIA EVENT (OUTPATIENT)
Dept: SURGERY | Facility: OTHER | Age: 32
End: 2022-03-18
Payer: COMMERCIAL

## 2022-03-18 ENCOUNTER — APPOINTMENT (OUTPATIENT)
Dept: CT IMAGING | Facility: OTHER | Age: 32
End: 2022-03-18
Attending: INTERNAL MEDICINE
Payer: COMMERCIAL

## 2022-03-18 PROBLEM — D50.0 IRON DEFICIENCY ANEMIA DUE TO CHRONIC BLOOD LOSS: Status: ACTIVE | Noted: 2022-03-18

## 2022-03-18 LAB
ANION GAP SERPL CALCULATED.3IONS-SCNC: 7 MMOL/L (ref 3–14)
BUN SERPL-MCNC: 5 MG/DL (ref 7–25)
CALCIUM SERPL-MCNC: 8.7 MG/DL (ref 8.6–10.3)
CHLORIDE BLD-SCNC: 108 MMOL/L (ref 98–107)
CO2 SERPL-SCNC: 25 MMOL/L (ref 21–31)
CREAT SERPL-MCNC: 0.83 MG/DL (ref 0.6–1.2)
ERYTHROCYTE [DISTWIDTH] IN BLOOD BY AUTOMATED COUNT: 17.5 % (ref 10–15)
FERRITIN SERPL-MCNC: 6 NG/ML (ref 24–336)
GFR SERPL CREATININE-BSD FRML MDRD: >90 ML/MIN/1.73M2
GLUCOSE BLD-MCNC: 98 MG/DL (ref 70–105)
H PYLORI AG STL QL IA: NEGATIVE
HBA1C MFR BLD: 5.5 % (ref 4–6.2)
HCG UR QL: NEGATIVE
HCT VFR BLD AUTO: 31.3 % (ref 35–47)
HGB BLD-MCNC: 9.5 G/DL (ref 11.7–15.7)
IRON SATN MFR SERPL: 11 % (ref 20–55)
IRON SERPL-MCNC: 21 UG/DL (ref 50–212)
MCH RBC QN AUTO: 23.7 PG (ref 26.5–33)
MCHC RBC AUTO-ENTMCNC: 30.4 G/DL (ref 31.5–36.5)
MCV RBC AUTO: 78 FL (ref 78–100)
PLATELET # BLD AUTO: 334 10E3/UL (ref 150–450)
POTASSIUM BLD-SCNC: 4.1 MMOL/L (ref 3.5–5.1)
RBC # BLD AUTO: 4.01 10E6/UL (ref 3.8–5.2)
SODIUM SERPL-SCNC: 140 MMOL/L (ref 134–144)
TIBC SERPL-MCNC: 197.4 UG/DL (ref 245–400)
TRANSFERRIN SERPL-MCNC: 141 MG/DL (ref 203–362)
UIBC (UNSATURATED): 176.4 MG/DL
VIT B12 SERPL-MCNC: 192 PG/ML (ref 180–914)
WBC # BLD AUTO: 6.3 10E3/UL (ref 4–11)

## 2022-03-18 PROCEDURE — 999N000010 HC STATISTIC ANES STAT CODE-CRNA PER MINUTE: Performed by: SURGERY

## 2022-03-18 PROCEDURE — 82728 ASSAY OF FERRITIN: CPT | Performed by: INTERNAL MEDICINE

## 2022-03-18 PROCEDURE — 250N000013 HC RX MED GY IP 250 OP 250 PS 637: Performed by: INTERNAL MEDICINE

## 2022-03-18 PROCEDURE — G0378 HOSPITAL OBSERVATION PER HR: HCPCS

## 2022-03-18 PROCEDURE — 250N000011 HC RX IP 250 OP 636: Performed by: INTERNAL MEDICINE

## 2022-03-18 PROCEDURE — 81025 URINE PREGNANCY TEST: CPT | Performed by: INTERNAL MEDICINE

## 2022-03-18 PROCEDURE — 250N000013 HC RX MED GY IP 250 OP 250 PS 637: Performed by: SURGERY

## 2022-03-18 PROCEDURE — 250N000009 HC RX 250: Performed by: NURSE ANESTHETIST, CERTIFIED REGISTERED

## 2022-03-18 PROCEDURE — 99225 PR SUBSEQUENT OBSERVATION CARE,LEVEL II: CPT | Performed by: INTERNAL MEDICINE

## 2022-03-18 PROCEDURE — 80048 BASIC METABOLIC PNL TOTAL CA: CPT | Performed by: INTERNAL MEDICINE

## 2022-03-18 PROCEDURE — 96376 TX/PRO/DX INJ SAME DRUG ADON: CPT

## 2022-03-18 PROCEDURE — 43239 EGD BIOPSY SINGLE/MULTIPLE: CPT | Performed by: SURGERY

## 2022-03-18 PROCEDURE — 82607 VITAMIN B-12: CPT | Performed by: INTERNAL MEDICINE

## 2022-03-18 PROCEDURE — 43239 EGD BIOPSY SINGLE/MULTIPLE: CPT | Performed by: NURSE ANESTHETIST, CERTIFIED REGISTERED

## 2022-03-18 PROCEDURE — 87338 HPYLORI STOOL AG IA: CPT | Performed by: INTERNAL MEDICINE

## 2022-03-18 PROCEDURE — 36415 COLL VENOUS BLD VENIPUNCTURE: CPT | Performed by: INTERNAL MEDICINE

## 2022-03-18 PROCEDURE — 83036 HEMOGLOBIN GLYCOSYLATED A1C: CPT | Performed by: INTERNAL MEDICINE

## 2022-03-18 PROCEDURE — 99213 OFFICE O/P EST LOW 20 MIN: CPT | Mod: 25 | Performed by: SURGERY

## 2022-03-18 PROCEDURE — 258N000003 HC RX IP 258 OP 636: Performed by: NURSE ANESTHETIST, CERTIFIED REGISTERED

## 2022-03-18 PROCEDURE — 258N000003 HC RX IP 258 OP 636: Performed by: INTERNAL MEDICINE

## 2022-03-18 PROCEDURE — 74177 CT ABD & PELVIS W/CONTRAST: CPT

## 2022-03-18 PROCEDURE — 85027 COMPLETE CBC AUTOMATED: CPT | Performed by: INTERNAL MEDICINE

## 2022-03-18 PROCEDURE — 83550 IRON BINDING TEST: CPT | Performed by: INTERNAL MEDICINE

## 2022-03-18 PROCEDURE — 250N000011 HC RX IP 250 OP 636: Performed by: NURSE ANESTHETIST, CERTIFIED REGISTERED

## 2022-03-18 PROCEDURE — 88305 TISSUE EXAM BY PATHOLOGIST: CPT

## 2022-03-18 RX ORDER — SODIUM CHLORIDE, SODIUM LACTATE, POTASSIUM CHLORIDE, CALCIUM CHLORIDE 600; 310; 30; 20 MG/100ML; MG/100ML; MG/100ML; MG/100ML
INJECTION, SOLUTION INTRAVENOUS CONTINUOUS
Status: CANCELLED | OUTPATIENT
Start: 2022-03-18

## 2022-03-18 RX ORDER — SUCRALFATE ORAL 1 G/10ML
1 SUSPENSION ORAL
Status: DISCONTINUED | OUTPATIENT
Start: 2022-03-18 | End: 2022-03-19 | Stop reason: HOSPADM

## 2022-03-18 RX ORDER — SODIUM CHLORIDE, SODIUM LACTATE, POTASSIUM CHLORIDE, CALCIUM CHLORIDE 600; 310; 30; 20 MG/100ML; MG/100ML; MG/100ML; MG/100ML
INJECTION, SOLUTION INTRAVENOUS CONTINUOUS PRN
Status: DISCONTINUED | OUTPATIENT
Start: 2022-03-18 | End: 2022-03-18

## 2022-03-18 RX ORDER — ACETAMINOPHEN 325 MG/1
650 TABLET ORAL EVERY 6 HOURS PRN
Status: DISCONTINUED | OUTPATIENT
Start: 2022-03-18 | End: 2022-03-19 | Stop reason: HOSPADM

## 2022-03-18 RX ORDER — HYDROMORPHONE HYDROCHLORIDE 1 MG/ML
0.4 INJECTION, SOLUTION INTRAMUSCULAR; INTRAVENOUS; SUBCUTANEOUS EVERY 5 MIN PRN
Status: DISCONTINUED | OUTPATIENT
Start: 2022-03-18 | End: 2022-03-18 | Stop reason: HOSPADM

## 2022-03-18 RX ORDER — FENTANYL CITRATE 50 UG/ML
50 INJECTION, SOLUTION INTRAMUSCULAR; INTRAVENOUS
Status: DISCONTINUED | OUTPATIENT
Start: 2022-03-18 | End: 2022-03-18 | Stop reason: CLARIF

## 2022-03-18 RX ORDER — ONDANSETRON 4 MG/1
4 TABLET, ORALLY DISINTEGRATING ORAL EVERY 30 MIN PRN
Status: DISCONTINUED | OUTPATIENT
Start: 2022-03-18 | End: 2022-03-18 | Stop reason: CLARIF

## 2022-03-18 RX ORDER — SODIUM CHLORIDE, SODIUM LACTATE, POTASSIUM CHLORIDE, CALCIUM CHLORIDE 600; 310; 30; 20 MG/100ML; MG/100ML; MG/100ML; MG/100ML
INJECTION, SOLUTION INTRAVENOUS CONTINUOUS
Status: DISCONTINUED | OUTPATIENT
Start: 2022-03-18 | End: 2022-03-18 | Stop reason: CLARIF

## 2022-03-18 RX ORDER — IOPAMIDOL 755 MG/ML
100 INJECTION, SOLUTION INTRAVASCULAR ONCE
Status: COMPLETED | OUTPATIENT
Start: 2022-03-18 | End: 2022-03-18

## 2022-03-18 RX ORDER — ONDANSETRON 2 MG/ML
4 INJECTION INTRAMUSCULAR; INTRAVENOUS EVERY 30 MIN PRN
Status: DISCONTINUED | OUTPATIENT
Start: 2022-03-18 | End: 2022-03-18 | Stop reason: CLARIF

## 2022-03-18 RX ORDER — PROPOFOL 10 MG/ML
INJECTION, EMULSION INTRAVENOUS CONTINUOUS PRN
Status: DISCONTINUED | OUTPATIENT
Start: 2022-03-18 | End: 2022-03-18

## 2022-03-18 RX ORDER — PROPOFOL 10 MG/ML
INJECTION, EMULSION INTRAVENOUS PRN
Status: DISCONTINUED | OUTPATIENT
Start: 2022-03-18 | End: 2022-03-18

## 2022-03-18 RX ORDER — FENTANYL CITRATE 50 UG/ML
25 INJECTION, SOLUTION INTRAMUSCULAR; INTRAVENOUS EVERY 5 MIN PRN
Status: DISCONTINUED | OUTPATIENT
Start: 2022-03-18 | End: 2022-03-18 | Stop reason: HOSPADM

## 2022-03-18 RX ORDER — LIDOCAINE HYDROCHLORIDE 20 MG/ML
INJECTION, SOLUTION INFILTRATION; PERINEURAL PRN
Status: DISCONTINUED | OUTPATIENT
Start: 2022-03-18 | End: 2022-03-18

## 2022-03-18 RX ORDER — LIDOCAINE 40 MG/G
CREAM TOPICAL
Status: CANCELLED | OUTPATIENT
Start: 2022-03-18

## 2022-03-18 RX ORDER — OXYCODONE HYDROCHLORIDE 5 MG/1
5 TABLET ORAL EVERY 4 HOURS PRN
Status: DISCONTINUED | OUTPATIENT
Start: 2022-03-18 | End: 2022-03-18 | Stop reason: CLARIF

## 2022-03-18 RX ADMIN — SUCRALFATE 1 G: 1 SUSPENSION ORAL at 16:29

## 2022-03-18 RX ADMIN — PROPOFOL 120 MG: 10 INJECTION, EMULSION INTRAVENOUS at 14:49

## 2022-03-18 RX ADMIN — LIDOCAINE HYDROCHLORIDE 60 MG: 20 INJECTION, SOLUTION INFILTRATION; PERINEURAL at 14:49

## 2022-03-18 RX ADMIN — SODIUM CHLORIDE, SODIUM LACTATE, POTASSIUM CHLORIDE, AND CALCIUM CHLORIDE: 600; 310; 30; 20 INJECTION, SOLUTION INTRAVENOUS at 14:47

## 2022-03-18 RX ADMIN — SUCRALFATE 1 G: 1 SUSPENSION ORAL at 21:27

## 2022-03-18 RX ADMIN — QUETIAPINE FUMARATE 25 MG: 25 TABLET ORAL at 21:27

## 2022-03-18 RX ADMIN — ONDANSETRON 4 MG: 2 INJECTION INTRAMUSCULAR; INTRAVENOUS at 08:59

## 2022-03-18 RX ADMIN — ACETAMINOPHEN 650 MG: 325 TABLET, FILM COATED ORAL at 10:27

## 2022-03-18 RX ADMIN — ACETAMINOPHEN 650 MG: 325 TABLET, FILM COATED ORAL at 21:35

## 2022-03-18 RX ADMIN — PROPOFOL 140 MCG/KG/MIN: 10 INJECTION, EMULSION INTRAVENOUS at 14:49

## 2022-03-18 RX ADMIN — IOPAMIDOL 100 ML: 755 INJECTION, SOLUTION INTRAVENOUS at 12:21

## 2022-03-18 RX ADMIN — ESCITALOPRAM OXALATE 20 MG: 10 TABLET ORAL at 10:27

## 2022-03-18 RX ADMIN — SODIUM CHLORIDE: 9 INJECTION, SOLUTION INTRAVENOUS at 05:01

## 2022-03-18 RX ADMIN — FAMOTIDINE 20 MG: 20 TABLET ORAL at 21:27

## 2022-03-18 RX ADMIN — CLONAZEPAM 1 MG: 1 TABLET ORAL at 10:27

## 2022-03-18 RX ADMIN — FAMOTIDINE 20 MG: 20 TABLET ORAL at 10:27

## 2022-03-18 NOTE — CONSULTS
Surgical  Consult  Referring physician:  DANILO De Guzman  Primary physician:     Nicho Peoples    Chief complaint:   Chronic nausea and vomiting    History of present illness:  This is a 31 year old female I am seeing in consultation for chronic nausea and vomiting.  Patient reports this is been going on for 11 years.  She has had a negative work-up in the past at the [facility].  She has a remote history of an ulcer.  She had Covid in early February.  Her nausea and vomiting has worsened and she was admitted for IV hydration.  Her hemoglobin was 11 and is now stable at 9.  H. pylori stool was negative.  CT scan today shows no evidence of obstruction.     Past medical history:   Past Medical History:   Diagnosis Date     Helicobacter pylori infection     No Comments Provided       Pastsurgical history:  Past Surgical History:   Procedure Laterality Date     exision mass retrorectal and coccyx  06/09/2017    Ori Esp[facility] MD at [facility]       Current medications:  No current outpatient medications on file.       Allergies:  Allergies   Allergen Reactions     Other  [No Clinical Screening - See Comments]      Other reaction(s): Throat Irritation  Carmex, Pine, Icy Hot     [facility] Cleansing Skin      Other reaction(s): Swelling     Menthol      Other reaction(s): Swelling     Pine Tar      Other reaction(s): Runny Nose       Family history:  Family History   Problem Relation Age of Onset     Cancer Father         Cancer, h/o testicular CA     Heart Disease Maternal Grandmother         Heart Disease,cardiomyopathy     Blood Disease Maternal Grandmother         Blood Disease,blood dyscrasia       Social history:  Social History     Socioeconomic History     Marital status:      Spouse name: Not on file     Number of children: Not on file     Years of education: Not on file     Highest education level: Not on file   Occupational History     Not on file   Tobacco Use     Smoking status: Never Smoker     Smokeless  tobacco: Never Used   Vaping Use     Vaping Use: Never used   Substance and Sexual Activity     Alcohol use: Not Currently     Alcohol/week: 4.2 standard drinks     Drug use: No     Sexual activity: Yes     Partners: Male     Birth control/protection: Injection   Other Topics Concern     Not on file   Social History Narrative    Graduated from college, 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  Moved back to [place] in February 2018.  Engaged to be  July 2018.    Dannie Father    Criselda  Mother    Sibs     Joanne 1989    Juan Miguel 1991    Yury 1996.     Social Determinants of Health     Financial Resource Strain: Not on file   Food Insecurity: Not on file   Transportation Needs: Not on file   Physical Activity: Not on file   Stress: Not on file   Social Connections: Not on file   Intimate Partner Violence: Not on file   Housing Stability: Not on file       PROBLEM LIST:  Patient Active Problem List   Diagnosis     ADHD     Allergic rhinitis due to pollen     Contact dermatitis and eczema     Menorrhagia with irregular cycle     Chronic ulcer of sacral region with fat layer exposed (H)     Mass of perirectal soft tissue     Other granulomatous disorders of the skin and subcutaneous tissue     Screening for malignant neoplasm of cervix     Controlled substance agreement signed 2/20/18, 7/2/19, 10/26/2020     Anxiety     Intractable nausea and vomiting     Hypokalemia     Hypovolemia     Iron deficiency anemia due to chronic blood loss           Physical exam: /87   Pulse 57   Temp 97.8  F (36.6  C) (Oral)   Resp 16   Wt 89.4 kg (197 lb)   LMP 03/11/2022 (Exact Date)   SpO2 96%   BMI 34.90 kg/m        General: this is a pleasant female patient in no acute distress.  Patient is awake alert and oriented x3 .   Respiratory: Clear  Cardiovascular: Regular rate and rhythm  Abdominal: Nontender    Assessment:   1.  Iron deficiency anemia  2.  Chronic intractable nausea and vomiting.  This  certainly does not appear to be related to any obstructive phenomenon.  She is not diabetic so gastroparesis is less likely.  There are many nonsurgical causes of nausea and vomiting.  I explained that an upper endoscopy will not likely answer why she has nausea and vomiting, but it is appropriate as part of work-up for her anemia.    Plan:    EGD today.  Risks of bleeding, aspiration, perforation discussed and wishes to proceed.       Osmar Velez MD FACS

## 2022-03-18 NOTE — TELEPHONE ENCOUNTER
Please call the patient. She is in the hospital here. She has questions for you and they are not doing something for her that she wants.      Sari Clifton on 3/18/2022 at 8:47 AM

## 2022-03-18 NOTE — ANESTHESIA PREPROCEDURE EVALUATION
Anesthesia Pre-Procedure Evaluation    Patient: Tia Sethi   MRN: [id number] : 1990        Procedure : Procedure(s):  ESOPHAGOGASTRODUODENOSCOPY (EGD)          Past Medical History:   Diagnosis Date     Helicobacter pylori infection     No Comments Provided      Past Surgical History:   Procedure Laterality Date     exision mass retrorectal and coccyx  2017    Ori Espinoza MD at [facility]      Allergies   Allergen Reactions     Other  [No Clinical Screening - See Comments]      Other reaction(s): Throat Irritation  Carmex, Pine, Icy Hot     [facility] Cleansing Skin      Other reaction(s): Swelling     Menthol      Other reaction(s): Swelling     Pine Tar      Other reaction(s): Runny Nose      Social History     Tobacco Use     Smoking status: Never Smoker     Smokeless tobacco: Never Used   Substance Use Topics     Alcohol use: Not Currently     Alcohol/week: 4.2 standard drinks      Wt Readings from Last 1 Encounters:   22 89.4 kg (197 lb)        Anesthesia Evaluation   Pt has had prior anesthetic.     No history of anesthetic complications       ROS/MED HX  ENT/Pulmonary:     (+) allergic rhinitis,     Neurologic:  - neg neurologic ROS     Cardiovascular:  - neg cardiovascular ROS     METS/Exercise Tolerance: >4 METS    Hematologic:     (+) anemia,     Musculoskeletal:  - neg musculoskeletal ROS     GI/Hepatic:     (+) GERD, Asymptomatic on medication,     Renal/Genitourinary:  - neg Renal ROS     Endo:  - neg endo ROS     Psychiatric/Substance Use: Comment: ADHD    (+) psychiatric history anxiety     Infectious Disease:  - neg infectious disease ROS     Malignancy:  - neg malignancy ROS     Other:  - neg other ROS          Physical Exam    Airway  airway exam normal      Mallampati: I   TM distance: > 3 FB   Neck ROM: full   Mouth opening: > 3 cm    Respiratory Devices and Support         Dental  no notable dental history         Cardiovascular   cardiovascular exam normal        Rhythm and rate: regular and normal     Pulmonary   pulmonary exam normal        breath sounds clear to auscultation           OUTSIDE LABS:  CBC:   Lab Results   Component Value Date    WBC 6.3 03/18/2022    WBC 7.0 03/17/2022    HGB 9.5 (L) 03/18/2022    HGB 9.4 (L) 03/17/2022    HCT 31.3 (L) 03/18/2022    HCT 30.6 (L) 03/17/2022     03/18/2022     03/17/2022     BMP:   Lab Results   Component Value Date     03/18/2022     03/17/2022    POTASSIUM 4.1 03/18/2022    POTASSIUM 4.0 03/17/2022    CHLORIDE 108 (H) 03/18/2022    CHLORIDE 108 (H) 03/17/2022    CO2 25 03/18/2022    CO2 25 03/17/2022    BUN 5 (L) 03/18/2022    BUN 9 03/17/2022    CR 0.83 03/18/2022    CR 0.89 03/17/2022    GLC 98 03/18/2022    GLC 91 03/17/2022     COAGS: No results found for: PTT, INR, FIBR  POC:   Lab Results   Component Value Date    HCG Negative 03/18/2022     HEPATIC:   Lab Results   Component Value Date    ALBUMIN 3.6 03/17/2022    PROTTOTAL 6.4 03/17/2022    ALT 14 03/17/2022    AST 16 03/17/2022    ALKPHOS 50 03/17/2022    BILITOTAL 0.6 03/17/2022     OTHER:   Lab Results   Component Value Date    LACT 1.1 03/16/2022    PRASANNA 8.7 03/18/2022    MAG 1.9 03/17/2022    LIPASE 18 03/16/2022    AMYLASE 33 03/16/2022    TSH 1.45 10/22/2021    CRP 1.8 03/16/2022       Anesthesia Plan    ASA Status:  2   NPO Status:  NPO Appropriate    Anesthesia Type: MAC.     - Reason for MAC: straight local not clinically adequate   Induction: Intravenous.   Maintenance: Balanced.        Consents    Anesthesia Plan(s) and associated risks, benefits, and realistic alternatives discussed. Questions answered and patient/representative(s) expressed understanding.    - Discussed:     - Discussed with:  Patient, Spouse      - Extended Intubation/Ventilatory Support Discussed: No.      - Patient is DNR/DNI Status: No    Use of blood products discussed: No .     Postoperative Care            Comments:    Other Comments: Risks, benefits  and alternatives discussed and would like to proceed. Denies any recent nausea or vomiting.  General anesthesia ok if indicated.             CLAIRE Karimi CRNA

## 2022-03-18 NOTE — OP NOTE
Procedure note  Date of service: 3/18/2022    Preoperative diagnosis: Iron deficiency anemia    Postoperative diagnosis: Same  Normal exam    Procedure:   EGD with biopsy    Anesthesia:   FRANCHESCA Miller CRNA    Indication for the procedure:This is a 31 year old female with iron deficiency anemia and chronic nausea and vomiting.  After explaining the risks to include bleeding, aspiration, perforation, the patient wished to proceed.    Procedure:After adequate sedation, the patient was in the left lateral decubitus position.  The esophagoscope was passed under direct vision into the esophagus and onto the second portion of the duodenum.  The duodenum was unremarkable and biopsied.  The antrum was unremarkable.  Biopsies were taken from the antrum to look for occult H. Pylori.  The scope was retroflexed.  The GE junction and fundus were without hiatal hernia .  Scope was straightened and pulled back.  The distal esophagus was with slightly irregular .  Biopsies x 8 were taken from the distal esophagus.  The remaining esophagus was unremarkable . The scope was removed.The patient tolerated the procedure well.    Recommendations:    Await pathology    Surgeon: Osmar Velez MD FACS

## 2022-03-18 NOTE — PROGRESS NOTES
OUTPATIENT/OBSERVATION GOALS TO BE MET BEFORE DISCHARGE:  1. ADLs back to baseline: Yes    2. Activity and level of assistance: Ambulating independently.    3. Pain status: Pain free.    4. Return to near baseline physical activity: Yes     Discharge Planner Nurse   Safe discharge environment identified: Yes  Barriers to discharge: No       Entered by: Gordon Moulton 03/18/2022 2:49 AM     Please review provider order for any additional goals.   Nurse to notify provider when observation goals have been met and patient is ready for discharge.

## 2022-03-18 NOTE — PROGRESS NOTES
Progress note to follow.  In short patient evaluated this morning for intractable nausea and vomiting, poor oral intake, discussed needing a repeat urine pregnancy test this morning then undergoing CT scan abdomen pelvis for evaluation of anatomic explanations for her symptoms.  Also following up h. pylori.  Discussed with nursing regarding needing repeat UPT prior to imaging which apparently did not happen.  General surgery discussed with patient regarding EGD for anemia eval given negative CT scan and negative H. pylori.  Rediscussed with nursing that urine pregnancy test needs to be done stat prior to going for EGD.  Patient will be reassessed after EGD to consider disposition and follow-up options.    Darryn De Guzman MD

## 2022-03-18 NOTE — ANESTHESIA POSTPROCEDURE EVALUATION
Patient: Tia Sethi    Procedure: Procedure(s):  ESOPHAGOGASTRODUODENOSCOPY, WITH BIOPSY       Anesthesia Type:  MAC    Note:  Disposition: Inpatient   Postop Pain Control: Uneventful            Sign Out: Well controlled pain   PONV: No   Neuro/Psych: Uneventful            Sign Out: Acceptable/Baseline neuro status   Airway/Respiratory: Uneventful            Sign Out: Acceptable/Baseline resp. status   CV/Hemodynamics: Uneventful            Sign Out: Acceptable CV status   Other NRE: NONE   DID A NON-ROUTINE EVENT OCCUR? No           Last vitals:  Vitals Value Taken Time   /86 03/18/22 1516   Temp 97  F (36.1  C) 03/18/22 1509   Pulse 65 03/18/22 1516   Resp 16 03/18/22 1516   SpO2 97 % 03/18/22 1516   Vitals shown include unvalidated device data.    Electronically Signed By: CLAIER Karimi CRNA  March 18, 2022  3:41 PM

## 2022-03-18 NOTE — TELEPHONE ENCOUNTER
I'm not sure that any other provider will feel comfortable weighing in without having all the information. But would reassure patient that she has excellent doctors taking care of her in the hospital. Sometimes plans change based on other results or symptoms. Brigette Sands MD

## 2022-03-18 NOTE — PROGRESS NOTES
Patient was pleasant. Vitals are stable. Patient slept majority of the night. Patient did not complain of nausea or vomiting. No new concerns at this time.    OUTPATIENT/OBSERVATION GOALS TO BE MET BEFORE DISCHARGE:  1. ADLs back to baseline: Yes    2. Activity and level of assistance: Ambulating independently.    3. Pain status: Pain free.    4. Return to near baseline physical activity: Yes     Discharge Planner Nurse   Safe discharge environment identified: Yes  Barriers to discharge: No       Entered by: Gordon Moulton 03/18/2022 3:43 AM     Please review provider order for any additional goals.   Nurse to notify provider when observation goals have been met and patient is ready for discharge.

## 2022-03-18 NOTE — PLAN OF CARE
OUTPATIENT/OBSERVATION GOALS TO BE MET BEFORE DISCHARGE:  ADLs back to baseline: Yes    Activity and level of assistance: Ambulating independently.    Pain status: Improved-controlled with oral pain medications.    Return to near baseline physical activity: Yes     Discharge Planner Nurse   Safe discharge environment identified: Yes  Barriers to discharge: No       Entered by: Julia Nguyen 03/18/2022 6:25 PM     Please review provider order for any additional goals.   Nurse to notify provider when observation goals have been met and patient is ready for discharge.Goal Outcome Evaluation:

## 2022-03-18 NOTE — PHARMACY
Pharmacy - Transfer Medication Reconciliation     The patient's transfer medication orders have been compared to the medication administration record and to the Prior to Admissions Medications list - any noted discrepancies were resolved with the MD.     Thank you. Pharmacy will continue to monitor.     Reema Han RPH ....................  3/18/2022   4:00 PM

## 2022-03-18 NOTE — ANESTHESIA CARE TRANSFER NOTE
Patient: Tia Sethi    Procedure: Procedure(s):  ESOPHAGOGASTRODUODENOSCOPY, WITH BIOPSY       Diagnosis: Iron deficiency anemia due to chronic blood loss [D50.0]  Diagnosis Additional Information: No value filed.    Anesthesia Type:   MAC     Note:    Oropharynx: oropharynx clear of all foreign objects  Level of Consciousness: awake  Oxygen Supplementation: room air    Independent Airway: airway patency satisfactory and stable  Dentition: dentition unchanged  Vital Signs Stable: post-procedure vital signs reviewed and stable  Report to RN Given: handoff report given  Patient transferred to: PACU    Handoff Report: Identifed the Patient, Identified the Reponsible Provider, Reviewed the pertinent medical history, Discussed the surgical course, Reviewed Intra-OP anesthesia mangement and issues during anesthesia, Set expectations for post-procedure period and Allowed opportunity for questions and acknowledgement of understanding      Vitals:  Vitals Value Taken Time   BP     Temp     Pulse     Resp     SpO2         Electronically Signed By: CLAIRE Karimi CRNA  March 18, 2022  3:07 PM

## 2022-03-18 NOTE — PROGRESS NOTES
PACU Transfer Note    Tia Sethi was transferred to [facility] via cart.  Equipment used for transport:  none.  Accompanied by:  none    PACU Respiratory Event Documentation     1) Episodes of Apnea greater than or equal to 10 seconds: no    2) Bradypnea - less than 8 breaths per minute: no    3) Pain score on 0 to 10 scale: 0    4) Pain-sedation mismatch (yes or no): no    5) Repeated 02 desaturation less than 90% (yes or no): no    Anesthesia notified? (yes or no): n/a    Any of the above events occuring repeatedly in separate 30 minute intervals may be considered recurrent PACU respiratory events.    Patient stable and meets phase 1 discharge criteria for transport from PACU.

## 2022-03-18 NOTE — TELEPHONE ENCOUNTER
Called patient and left message to call back.  ......Renuka Kendrick LPN on 3/18/2022 at 9:12 AM

## 2022-03-18 NOTE — TELEPHONE ENCOUNTER
Patient states she is currently on medical admitted at the hospital and  endoscopy was what she was suppose to do but the hospital is doing a CT scan of her abdomen, she is wanting the endoscopy but wants Nicho Peoples MD input on the situation.   She is aware that Nicho Peoples MD is out today and I sending this to another provider  in the unit.  Please advise.   ...Renuka Kendrick LPN on 3/18/2022 at 9:34 AM

## 2022-03-18 NOTE — PROGRESS NOTES
"[facility]    Medicine Progress Note - Hospitalist Service    Date of Admission:  3/16/2022    Assessment & Plan          Tia Sethi is a 31 year old female admitted on 3/16/2022. She presents with intractable nausea, vomiting.     Principal Problem:    Intractable nausea and vomiting  Assessment: After further discussion this has been a somewhat chronic issue that cycles for her.  She had however a long.  Without the symptoms until getting Covid.  Since our Covid diagnosis approximately 6 weeks ago symptoms have been increasingly frequent.  TSH and early a.m. cortisol within normal limits October 2021, no significant EtOH or illicit drug use, given her persistent symptoms this morning however she underwent CT abdomen pelvis with oral contrast with no anatomic etiology.  Underwent EGD this afternoon without clear reason for her nausea or vomiting.  Given the correlation of her symptoms with recent diagnosis of COVID discussed referral for \"long COVID\" multidisciplinary approach at the [facility] which patient is agreeable to and referral placed.  She will otherwise benefit from aggressive antiemetics and use of home benzodiazepine regiment for symptomatic management.  -Advance diet over the course of the evening  -Discharge in a.m. if remaining clinically stable with additional Compazine  -Covid clinic referral placed  -Discontinue IV fluids  -Follow-up hemoglobin A1c    Anemia: Stable, normocytic, iron panel with probable components of both iron deficiency and possibly some chronic disease.  B12 within normal limits, TSH within normal limits, H pylori negative, EGD without upper GI source.    -Follow-up surgical biopsies  -Avoid NSAIDs and alcohol  -Consider iron supplement    anxiety  Assessment: chronic and stable  Plan:   -clonazepam as needed      Hypokalemia  Assessment: present on admission, secondary to vomiting, replaced per protocol and all stable  Plan:  -Monitor   "   Diet: Advance Diet as Tolerated: Clear Liquid Diet    DVT Prophylaxis: Pneumatic Compression Devices  Yen Catheter: Not present  Central Lines: None  Cardiac Monitoring: None  Code Status: Full Code      Disposition Plan   Expected Discharge: A.m.  Anticipated discharge location: home    Delays:   None       The patient's care was discussed with the Patient, bedside nurse and significant other and Dr. Sukumar De Guzman MD  Hospitalist Service  [facility]  Securely message with the Vocera Web Console (learn more here)  Text page via [employer] Paging/Directory       Interval History   Overnight no acute events and afebrile, ongoing intermittent nausea and vomiting, no abdominal pain this morning, she moved her bowels this morning, no of melanotic stools no bright red blood per rectum, no hematemesis, no hematuria or hemoptysis.  Frustrated this morning about not having a scope right away after some miscommunication about timing of EGD.  No increased lower extremity edema shortness of breath symptoms of orthopnea palpitations or chest pain.  No significant muscle aches but has had severe fatigue since having Covid and intermittent headaches.    Data reviewed today: I reviewed all medications, new labs and imaging results over the last 24 hours.    Physical Exam   Vital Signs: Temp: 97.1  F (36.2  C) Temp src: Tympanic BP: (!) 155/95 Pulse: 56   Resp: 16 SpO2: 98 % O2 Device: None (Room air)    Weight: 197 lbs 0 oz     Exam:   GENERAL: Talkative, laying in bed, in no apparent distress.  CARDIOVASCULAR: regular rate and rhythm, no murmurs, rubs, or gallops. Normal S1/S2. No lower extremity edema.   RESPIRATORY: clear to auscultation bilaterally, no wheezes, no crackles.   GI: soft, non-tender with deep palpation in all quadrants, non-distended, normoactive bowel sounds.  Not able to palpate liver due to body habitus.   MUSCULOSKELETAL: warm and well perfused, 2+ dorsalis pedis pulses bilaterally.     SKIN: no pallor, jaundice, or rashes    Data   Recent Labs   Lab 03/18/22  0712 03/17/22  0549 03/17/22  0103 03/16/22  1503   WBC 6.3 7.0  --  9.1   HGB 9.5* 9.4*  --  11.3*   MCV 78 78  --  76*    381  --  459*    139  --  137   POTASSIUM 4.1 4.0 3.6 3.1*   CHLORIDE 108* 108*  --  105   CO2 25 25  --  22   BUN 5* 9  --  9   CR 0.83 0.89  --  0.88   ANIONGAP 7 6  --  10   PRASANNA 8.7 8.7  --  9.4   GLC 98 91  --  93   ALBUMIN  --  3.6  --  4.5   PROTTOTAL  --  6.4  --  7.8   BILITOTAL  --  0.6  --  0.6   ALKPHOS  --  50  --  58   ALT  --  14  --  17   AST  --  16  --  19   LIPASE  --   --   --  18     Recent Results (from the past 24 hour(s))   CT Abdomen pelvis w contrast*    Narrative    PROCEDURE:  CT ABDOMEN PELVIS W CONTRAST    HISTORY: Nausea/vomiting; with po contrast    TECHNIQUE: Helical CT of the abdomen and pelvis was performed  following injection of intravenous contrast.    COMPARISON: None.    MEDS/CONTRAST: 100 ml isovue 370    FINDINGS:      Limited images through the lung bases demonstrate no focal  consolidation or mass. The heart size is normal. No pericardial or  pleural effusions are seen.    The liver demonstrates hepatic steatosis. The gallbladder, spleen,  pancreas and adrenal glands are unremarkable. Symmetric nephrograms  are present without hydronephrosis. There is no abdominal aortic  aneurysm.    The bowel is normal in caliber. The appendix is normal.    No free fluid, free air or adenopathy is present. No suspicious  osseous lesions are identified.      Impression    IMPRESSION:      No evidence of obstruction or free air.    Hepatic steatosis.    LISA SIMS MD         SYSTEM ID:  [id number]     Medications       escitalopram  20 mg Oral Daily     famotidine  20 mg Oral BID     QUEtiapine  25 mg Oral At Bedtime     sodium chloride (PF)  3 mL Intracatheter Q8H     sucralfate  1 g Oral 4x Daily AC & HS

## 2022-03-19 VITALS
OXYGEN SATURATION: 97 % | BODY MASS INDEX: 34.9 KG/M2 | DIASTOLIC BLOOD PRESSURE: 78 MMHG | SYSTOLIC BLOOD PRESSURE: 111 MMHG | TEMPERATURE: 97.8 F | RESPIRATION RATE: 20 BRPM | HEART RATE: 75 BPM | WEIGHT: 197 LBS

## 2022-03-19 LAB
ANION GAP SERPL CALCULATED.3IONS-SCNC: 7 MMOL/L (ref 3–14)
BUN SERPL-MCNC: 6 MG/DL (ref 7–25)
CALCIUM SERPL-MCNC: 9.1 MG/DL (ref 8.6–10.3)
CHLORIDE BLD-SCNC: 103 MMOL/L (ref 98–107)
CO2 SERPL-SCNC: 27 MMOL/L (ref 21–31)
CREAT SERPL-MCNC: 0.84 MG/DL (ref 0.6–1.2)
ERYTHROCYTE [DISTWIDTH] IN BLOOD BY AUTOMATED COUNT: 17.6 % (ref 10–15)
GFR SERPL CREATININE-BSD FRML MDRD: >90 ML/MIN/1.73M2
GLUCOSE BLD-MCNC: 87 MG/DL (ref 70–105)
HCT VFR BLD AUTO: 32.8 % (ref 35–47)
HGB BLD-MCNC: 9.9 G/DL (ref 11.7–15.7)
MAGNESIUM SERPL-MCNC: 2.1 MG/DL (ref 1.9–2.7)
MCH RBC QN AUTO: 23.6 PG (ref 26.5–33)
MCHC RBC AUTO-ENTMCNC: 30.2 G/DL (ref 31.5–36.5)
MCV RBC AUTO: 78 FL (ref 78–100)
PLATELET # BLD AUTO: 371 10E3/UL (ref 150–450)
POTASSIUM BLD-SCNC: 3.5 MMOL/L (ref 3.5–5.1)
RBC # BLD AUTO: 4.2 10E6/UL (ref 3.8–5.2)
SODIUM SERPL-SCNC: 137 MMOL/L (ref 134–144)
WBC # BLD AUTO: 7.3 10E3/UL (ref 4–11)

## 2022-03-19 PROCEDURE — G0378 HOSPITAL OBSERVATION PER HR: HCPCS

## 2022-03-19 PROCEDURE — 85027 COMPLETE CBC AUTOMATED: CPT | Performed by: INTERNAL MEDICINE

## 2022-03-19 PROCEDURE — 80048 BASIC METABOLIC PNL TOTAL CA: CPT | Performed by: INTERNAL MEDICINE

## 2022-03-19 PROCEDURE — 250N000011 HC RX IP 250 OP 636: Performed by: SURGERY

## 2022-03-19 PROCEDURE — 83735 ASSAY OF MAGNESIUM: CPT | Performed by: INTERNAL MEDICINE

## 2022-03-19 PROCEDURE — 99217 PR OBSERVATION CARE DISCHARGE: CPT | Performed by: INTERNAL MEDICINE

## 2022-03-19 PROCEDURE — 250N000013 HC RX MED GY IP 250 OP 250 PS 637: Performed by: SURGERY

## 2022-03-19 PROCEDURE — 36415 COLL VENOUS BLD VENIPUNCTURE: CPT | Performed by: INTERNAL MEDICINE

## 2022-03-19 RX ORDER — PROCHLORPERAZINE MALEATE 10 MG
10 TABLET ORAL EVERY 6 HOURS PRN
Qty: 30 TABLET | Refills: 3 | Status: SHIPPED | OUTPATIENT
Start: 2022-03-19 | End: 2022-08-11

## 2022-03-19 RX ADMIN — ESCITALOPRAM OXALATE 20 MG: 10 TABLET ORAL at 10:44

## 2022-03-19 RX ADMIN — ONDANSETRON 4 MG: 4 TABLET, ORALLY DISINTEGRATING ORAL at 08:46

## 2022-03-19 RX ADMIN — SUCRALFATE 1 G: 1 SUSPENSION ORAL at 10:45

## 2022-03-19 RX ADMIN — SUCRALFATE 1 G: 1 SUSPENSION ORAL at 07:16

## 2022-03-19 RX ADMIN — FAMOTIDINE 20 MG: 20 TABLET ORAL at 10:44

## 2022-03-19 NOTE — PROGRESS NOTES
[facility] DISCHARGE NOTE    Patient discharged to home at 11:19 AM via ambulation. Accompanied by spouse and staff. Discharge instructions reviewed with patient and spouse, opportunity offered to ask questions. Prescriptions sent to patients preferred pharmacy. All belongings sent with patient.    Gurdeep Knapp RN

## 2022-03-19 NOTE — PLAN OF CARE
Goal Outcome Evaluation:  Patient has had no nausea and vomiting this shift. Patient meets goals for discharge.

## 2022-03-19 NOTE — PHARMACY - DISCHARGE MEDICATION RECONCILIATION AND EDUCATION
Pharmacy:  Discharge Counseling and Medication Reconciliation    Tia Sethi  [facility] [address] [place] [address]  [phone] (home)   31 year old female  PCP: Nicho Peoples    Allergies: Other  [no clinical screening - see comments], [employer] cleansing skin, Menthol, and Pine tar    Discharge Counseling:    Pharmacist met with patient (and/or family) today to review the medication portion of the After Visit Summary (with an emphasis on NEW medications) and to address patient's questions/concerns.    Summary of Education: educated on use, indication, duration, and side effects of new medication. Discussed over the counter famotidine.     Materials Provided:  MedCounselor sheets printed from Clinical Pharmacology on: prochlorperazine    Discharge Medication Reconciliation:    It has been determined that the patient has an adequate supply of medications available or which can be obtained from the patient's preferred pharmacy, which HE/SHE has confirmed as: [facility].     Thank you for the consult.    Reema Han RPH........March 19, 2022 9:08 AM

## 2022-03-19 NOTE — DISCHARGE SUMMARY
"[facility]    Discharge Summary  Hospitalist    Date of Admission:  3/16/2022  Date of Discharge:  3/19/2022  Discharging Provider: Darryn eD Guzman  Date of Service (when I saw the patient): 03/19/22    Discharge Diagnoses   Principal Problem:    Intractable nausea and vomiting (3/16/2022)  Active Problems:    Anxiety (2/5/2019)    Hypokalemia (3/16/2022)    Hypovolemia (3/16/2022)    Iron deficiency anemia due to chronic blood loss (3/18/2022)      History of Present Illness   Tia Sethi is an 31 year old female who presented with the above.  Patient was admitted by Dr. Holland and per H&P, \"31 year old female who presents to the clinic today with persistent intractable nausea and vomiting.  She had vomited 18 times today.  She was positive for Covid in early February and had primarily GI symptoms at that time.  Since then she has had nausea and vomiting consistently.     She has a history of GI symptoms.  She had H. pylori gastritis, she has had a peptic ulcer and numerous endoscopies.  She is not pregnant.  She does not use THC.  She is not diabetic.  She has been passing gas and having stool but minimal output.  She complains of some mild epigastric discomfort.  No hematemesis or hematochezia.\"       Hospital Course   Tia Sethi was admitted on 3/16/2022.  The following problems were addressed during her hospitalization:     Intractable nausea and vomiting: After further discussion this has been a somewhat chronic issue that cycles for her.  She had however a long period of time without the symptoms until getting Covid.  Since Covid diagnosis approximately 6 weeks prior to admission symptoms have been increasingly frequent.  TSH and early a.m. cortisol within normal limits October 2021, no significant EtOH or illicit drug use.    She was initially rehydrated with IV fluids and continued on clear liquids.  She had no significant frequent vomiting but still felt nauseated and given " "her persistent symptoms underwent CT abdomen pelvis with oral contrast with no anatomic etiology.    She subsequently underwent EGD day prior to discharge, multiple biopsies taken but no etiology for nausea or vomiting.  Given the correlation of her symptoms with recent diagnosis of COVID discussed referral for \"long COVID\" multidisciplinary approach at the [facility] which patient is agreeable to and referral placed.  She will otherwise benefit from aggressive antiemetics and use of home benzodiazepine regiment for symptomatic management.  She was prescribed Compazin Zofran has been less effective for her.  Follow-up in clinic next week for routine post hospital follow-up.     Anemia: Stable, normocytic, improving, iron panel with probable components of both iron deficiency and possibly some chronic disease.  B12 within normal limits, TSH within normal limits, H pylori negative, EGD without upper GI source.    She was counseled to continue to avoid NSAIDs, surgery will follow up her biopsy results and consider repeat CBC at time of PCP follow-up.     Anxiety: chronic and stable and she will continue with her home regimen with no changes.       Hypokalemia: present on admission, secondary to vomiting, replaced per protocol and all stable without  further supplementation.    Darryn De Guzman MD    Significant Results and Procedures   EGD    Pending Results   These results will be followed up by NA  Unresulted Labs Ordered in the Past 30 Days of this Admission     No orders found from 2/14/2022 to 3/17/2022.          Code Status   Full Code       Primary Care Physician   Nicho Peoples    Physical Exam   Temp: 97.8  F (36.6  C) Temp src: Tympanic BP: 111/78 Pulse: 75   Resp: 20 SpO2: 97 % O2 Device: None (Room air)    Vitals:    03/17/22 0635   Weight: 89.4 kg (197 lb)     Vital Signs with Ranges  Temp:  [97  F (36.1  C)-98.5  F (36.9  C)] 97.8  F (36.6  C)  Pulse:  [56-78] 75  Resp:  [16-20] 20  BP: " (111-155)/(78-98) 111/78  SpO2:  [94 %-98 %] 97 %  I/O last 3 completed shifts:  In: 500 [P.O.:200; I.V.:300]  Out: 700 [Urine:700]    Exam on day of discharge:   GENERAL: Talkative, in no apparent distress.  CARDIOVASCULAR: regular rate and rhythm, no murmurs, rubs, or gallops. Normal S1/S2. No lower extremity edema.   RESPIRATORY: clear to auscultation bilaterally, no wheezes, no crackles.   GI: soft, non-tender with deep palpation in any quadrant, non-distended, normoactive bowel sounds.  MUSCULOSKELETAL: warm and well perfused, 2+ dorsalis pedis pulses bilaterally.    SKIN: no pallor, jaundice, or rashes    Discharge Disposition   Discharged to home  Condition at discharge: Stable    Consultations This Hospital Stay   None    Time Spent on this Encounter   IDarryn MD, personally saw the patient today and spent greater than 30 minutes discharging this patient.    Discharge Orders      Adult Post Covid Clinic Referral      Reason for your hospital stay    Intractable nausea and vomiting     Follow-up and recommended labs and tests     Follow up is 3/23 at 1:20 with JACI Frederick    Your activity upon discharge: activity as tolerated     When to contact your care team    Call your primary doctor if you have any of the following: temperature greater than 101,  increased shortness of breath, increased drainage, increased swelling, or increased pain.     Discharge Instructions    - use the compazine as needed for nausea and vomiting  - you can use the zofran in addition to the compazine if you still feel nauseated  - continue to use your home Klonipin for anxiety and this will also help with nausea     Diet    Follow this diet upon discharge: Orders Placed This Encounter      Regular Diet Adult     Discharge Medications   Current Discharge Medication List      START taking these medications    Details   prochlorperazine (COMPAZINE) 10 MG tablet Take 1 tablet (10 mg) by mouth every 6 hours as needed for  nausea or vomiting  Qty: 30 tablet, Refills: 3    Associated Diagnoses: Intractable nausea and vomiting         CONTINUE these medications which have NOT CHANGED    Details   clonazePAM (KLONOPIN) 1 MG tablet TAKE 1/2 TO 1 TABLET BY MOUTH TWICE DAILY AS NEEDED FOR ANXIETY  Qty: 60 tablet, Refills: 5    Associated Diagnoses: Generalized anxiety disorder      escitalopram (LEXAPRO) 20 MG tablet Take 1 tablet (20 mg) by mouth daily  Qty: 30 tablet, Refills: 11    Comments: Dose increase to 20 mg.  Doesn't need today.  Associated Diagnoses: Generalized anxiety disorder      methylphenidate (RITALIN LA) 40 MG 24 hr capsule Take 40 mg by mouth every morning       naproxen (NAPROSYN) 500 MG tablet Take 500 mg by mouth 2 times daily as needed for moderate pain      omeprazole (PRILOSEC OTC) 20 MG EC tablet Take 1 tablet (20 mg) by mouth daily      ondansetron (ZOFRAN-ODT) 4 MG ODT tab Take 1 tablet (4 mg) by mouth every 8 hours as needed for nausea  Qty: 30 tablet, Refills: 4    Associated Diagnoses: Nausea and vomiting, intractability of vomiting not specified, unspecified vomiting type      Prenatal MV & Min w/FA-DHA ([employer] PRENATAL GUMMY PO) Take 1 chew tab by mouth daily      QUEtiapine (SEROQUEL) 25 MG tablet Take 1-2 tablets (25-50 mg) by mouth At Bedtime  Qty: 60 tablet, Refills: 11    Comments: Dose adjustment  Associated Diagnoses: Generalized anxiety disorder      triamcinolone (KENALOG) 0.1 % external cream Apply topically 3 times daily Apply  topically to affected area(s) 3 times daily.  Qty: 80 g, Refills: 3    Associated Diagnoses: Eczema, unspecified type           Allergies   Allergies   Allergen Reactions     Other  [No Clinical Screening - See Comments]      Other reaction(s): Throat Irritation  Carmex, Pine, Icy Hot     [employer] Cleansing Skin      Other reaction(s): Swelling     Menthol      Other reaction(s): Swelling     Pine Tar      Other reaction(s): Runny Nose     Data   Most Recent 3 CBC's:  Recent Labs    Lab Test 03/19/22  0545 03/18/22  0712 03/17/22  0549   WBC 7.3 6.3 7.0   HGB 9.9* 9.5* 9.4*   MCV 78 78 78    334 381      Most Recent 3 BMP's:  Recent Labs   Lab Test 03/19/22  0545 03/18/22  0712 03/17/22  0549    140 139   POTASSIUM 3.5 4.1 4.0   CHLORIDE 103 108* 108*   CO2 27 25 25   BUN 6* 5* 9   CR 0.84 0.83 0.89   ANIONGAP 7 7 6   PRASANNA 9.1 8.7 8.7   GLC 87 98 91     Most Recent 2 LFT's:  Recent Labs   Lab Test 03/17/22  0549 03/16/22  1503   AST 16 19   ALT 14 17   ALKPHOS 50 58   BILITOTAL 0.6 0.6     Most Recent INR's and Anticoagulation Dosing History:  Anticoagulation Dose History    There is no flowsheet data to display.       Most Recent 3 Troponin's:No lab results found.  Most Recent Cholesterol Panel:  Recent Labs   Lab Test 03/16/22  1503   CHOL 138   LDL 75   HDL 43   TRIG 99     Most Recent 6 Bacteria Isolates From Any Culture (See EPIC Reports for Culture Details):No lab results found.  Most Recent TSH, T4 and A1c Labs:  Recent Labs   Lab Test 03/18/22  0712 10/22/21  0945   TSH  --  1.45   A1C 5.5  --      Results for orders placed or performed during the hospital encounter of 03/16/22   CT Abdomen pelvis w contrast*    Narrative    PROCEDURE:  CT ABDOMEN PELVIS W CONTRAST    HISTORY: Nausea/vomiting; with po contrast    TECHNIQUE: Helical CT of the abdomen and pelvis was performed  following injection of intravenous contrast.    COMPARISON: None.    MEDS/CONTRAST: 100 ml isovue 370    FINDINGS:      Limited images through the lung bases demonstrate no focal  consolidation or mass. The heart size is normal. No pericardial or  pleural effusions are seen.    The liver demonstrates hepatic steatosis. The gallbladder, spleen,  pancreas and adrenal glands are unremarkable. Symmetric nephrograms  are present without hydronephrosis. There is no abdominal aortic  aneurysm.    The bowel is normal in caliber. The appendix is normal.    No free fluid, free air or adenopathy is present. No  suspicious  osseous lesions are identified.      Impression    IMPRESSION:      No evidence of obstruction or free air.    Hepatic steatosis.    LISA SIMS MD         SYSTEM ID:  [id number]

## 2022-03-21 ENCOUNTER — PATIENT OUTREACH (OUTPATIENT)
Dept: CARE COORDINATION | Facility: CLINIC | Age: 32
End: 2022-03-21
Payer: COMMERCIAL

## 2022-03-21 NOTE — PROGRESS NOTES
Transitional Care Management Phone Call    Summary of hospitalization:  [facility] discharge summary reviewed    DISCHARGE DIAGNOSIS: Principal Problem:    Intractable nausea and vomiting (3/16/2022)  Active Problems:    Anxiety (2/5/2019)    Hypokalemia (3/16/2022)    Hypovolemia (3/16/2022)    Iron deficiency anemia due to chronic blood loss (3/18/2022)     DATE OF DISCHARGE:  3/19/2022     Diagnostic Tests/Treatments reviewed.  Follow up needed:      Follow up is 3/23 at 1:20 with JACI Langford       surgery will follow up her biopsy results and  consider repeat CBC at time of PCP follow-up.    Post Discharge Medication Reconciliation: discharge medications reconciled and changed, per note/orders.  Medications reviewed by: by myself    Problems taking medications regularly:  None  Problems adhering to non-medication therapy:  None    Other Healthcare Providers Involved in Patient s Care:         None     Update since discharge: stable. Still has nausea, vomiting. Stomach pain, taking meds to help, manageable. Not as bad.  Denies fevers, sob. Sips. Did not  the COMPAZINE. Taking ZOFRAN. Will be coming to [facility] 3/23 with ELIZA. CBC recommended at follow up,patient would like lab checked.      Plan of care communicated with patient    Just a friendly reminder that you appointment is   Next 5 appointments (look out 90 days)    Mar 23, 2022  1:20 PM  Office Visit with Sari Langford PA-C  [facility] ([facility] ) [address]  [place] [address] [phone]      .  We encourage you to keep this appointment.    Please remember to bring all of your pills in their bottles (including any vitamins or over the counter pills) with you to your appointment.     The patient indicates understanding of these issues and agrees with the plan of care.   Yes, plans to follow plan of care and keep the scheduled appointment.    Was the  patient contacted within the 2 business days or other approved timeframe?  Yes     Was the Medication reconciliation and management done since the patient was discharged? Yes   tcm completed.  Mary Dodson RN ,....................  3/22/2022   3:03 PM

## 2022-03-21 NOTE — PROGRESS NOTES
Clinic Care Coordination Contact  [facility]/Voicemail       Clinical Data: Care Coordinator Outreach  Outreach attempted x 1.  Left message on patient's voicemail with call back information and requested return call.  Plan: Care Coordinator will try to reach patient again in 1-2 business days.    Mary Dodson RN ,....................  3/21/2022   12:21 PM

## 2022-03-22 LAB
PATH REPORT.COMMENTS IMP SPEC: NORMAL
PATH REPORT.FINAL DX SPEC: NORMAL
PATH REPORT.RELEVANT HX SPEC: NORMAL
PHOTO IMAGE: NORMAL

## 2022-03-23 ENCOUNTER — OFFICE VISIT (OUTPATIENT)
Dept: FAMILY MEDICINE | Facility: OTHER | Age: 32
End: 2022-03-23
Attending: PHYSICIAN ASSISTANT
Payer: COMMERCIAL

## 2022-03-23 VITALS
RESPIRATION RATE: 18 BRPM | HEIGHT: 64 IN | BODY MASS INDEX: 33.77 KG/M2 | DIASTOLIC BLOOD PRESSURE: 80 MMHG | WEIGHT: 197.8 LBS | OXYGEN SATURATION: 98 % | HEART RATE: 85 BPM | SYSTOLIC BLOOD PRESSURE: 122 MMHG | TEMPERATURE: 97.8 F

## 2022-03-23 DIAGNOSIS — Z09 HOSPITAL DISCHARGE FOLLOW-UP: Primary | ICD-10-CM

## 2022-03-23 DIAGNOSIS — R11.2 INTRACTABLE NAUSEA AND VOMITING: ICD-10-CM

## 2022-03-23 DIAGNOSIS — R10.13 EPIGASTRIC PAIN: ICD-10-CM

## 2022-03-23 DIAGNOSIS — F90.9 ATTENTION DEFICIT HYPERACTIVITY DISORDER (ADHD), UNSPECIFIED ADHD TYPE: ICD-10-CM

## 2022-03-23 DIAGNOSIS — R11.2 NON-INTRACTABLE VOMITING WITH NAUSEA, UNSPECIFIED VOMITING TYPE: ICD-10-CM

## 2022-03-23 DIAGNOSIS — R10.11 RUQ ABDOMINAL PAIN: ICD-10-CM

## 2022-03-23 DIAGNOSIS — Z79.899 CONTROLLED SUBSTANCE AGREEMENT SIGNED: ICD-10-CM

## 2022-03-23 LAB
BASOPHILS # BLD AUTO: 0.1 10E3/UL (ref 0–0.2)
BASOPHILS NFR BLD AUTO: 1 %
EOSINOPHIL # BLD AUTO: 0.2 10E3/UL (ref 0–0.7)
EOSINOPHIL NFR BLD AUTO: 3 %
ERYTHROCYTE [DISTWIDTH] IN BLOOD BY AUTOMATED COUNT: 18.4 % (ref 10–15)
HCT VFR BLD AUTO: 36.7 % (ref 35–47)
HGB BLD-MCNC: 11.1 G/DL (ref 11.7–15.7)
IMM GRANULOCYTES # BLD: 0 10E3/UL
IMM GRANULOCYTES NFR BLD: 0 %
LYMPHOCYTES # BLD AUTO: 3.1 10E3/UL (ref 0.8–5.3)
LYMPHOCYTES NFR BLD AUTO: 36 %
MCH RBC QN AUTO: 23.8 PG (ref 26.5–33)
MCHC RBC AUTO-ENTMCNC: 30.2 G/DL (ref 31.5–36.5)
MCV RBC AUTO: 79 FL (ref 78–100)
MONOCYTES # BLD AUTO: 0.7 10E3/UL (ref 0–1.3)
MONOCYTES NFR BLD AUTO: 8 %
NEUTROPHILS # BLD AUTO: 4.4 10E3/UL (ref 1.6–8.3)
NEUTROPHILS NFR BLD AUTO: 52 %
NRBC # BLD AUTO: 0 10E3/UL
NRBC BLD AUTO-RTO: 0 /100
PLATELET # BLD AUTO: 376 10E3/UL (ref 150–450)
RBC # BLD AUTO: 4.67 10E6/UL (ref 3.8–5.2)
WBC # BLD AUTO: 8.5 10E3/UL (ref 4–11)

## 2022-03-23 PROCEDURE — 36415 COLL VENOUS BLD VENIPUNCTURE: CPT | Mod: ZL | Performed by: PHYSICIAN ASSISTANT

## 2022-03-23 PROCEDURE — 85041 AUTOMATED RBC COUNT: CPT | Mod: ZL | Performed by: PHYSICIAN ASSISTANT

## 2022-03-23 PROCEDURE — 99496 TRANSJ CARE MGMT HIGH F2F 7D: CPT | Performed by: PHYSICIAN ASSISTANT

## 2022-03-23 RX ORDER — METHYLPHENIDATE HYDROCHLORIDE 40 MG/1
40 CAPSULE, EXTENDED RELEASE ORAL DAILY
Qty: 30 CAPSULE | Refills: 0 | Status: SHIPPED | OUTPATIENT
Start: 2022-04-21

## 2022-03-23 RX ORDER — METHYLPHENIDATE HYDROCHLORIDE 40 MG/1
40 CAPSULE, EXTENDED RELEASE ORAL EVERY MORNING
Qty: 30 CAPSULE | Refills: 0 | Status: SHIPPED | OUTPATIENT
Start: 2022-05-20

## 2022-03-23 RX ORDER — METHYLPHENIDATE HYDROCHLORIDE 40 MG/1
40 CAPSULE, EXTENDED RELEASE ORAL DAILY
Qty: 30 CAPSULE | Refills: 0 | Status: SHIPPED | OUTPATIENT
Start: 2022-03-23

## 2022-03-23 ASSESSMENT — ANXIETY QUESTIONNAIRES
3. WORRYING TOO MUCH ABOUT DIFFERENT THINGS: NOT AT ALL
7. FEELING AFRAID AS IF SOMETHING AWFUL MIGHT HAPPEN: NOT AT ALL
4. TROUBLE RELAXING: NOT AT ALL
7. FEELING AFRAID AS IF SOMETHING AWFUL MIGHT HAPPEN: NOT AT ALL
1. FEELING NERVOUS, ANXIOUS, OR ON EDGE: NOT AT ALL
5. BEING SO RESTLESS THAT IT IS HARD TO SIT STILL: NOT AT ALL
6. BECOMING EASILY ANNOYED OR IRRITABLE: NOT AT ALL
GAD7 TOTAL SCORE: 0
2. NOT BEING ABLE TO STOP OR CONTROL WORRYING: NOT AT ALL

## 2022-03-23 ASSESSMENT — PATIENT HEALTH QUESTIONNAIRE - PHQ9
SUM OF ALL RESPONSES TO PHQ QUESTIONS 1-9: 2
10. IF YOU CHECKED OFF ANY PROBLEMS, HOW DIFFICULT HAVE THESE PROBLEMS MADE IT FOR YOU TO DO YOUR WORK, TAKE CARE OF THINGS AT HOME, OR GET ALONG WITH OTHER PEOPLE: NOT DIFFICULT AT ALL
SUM OF ALL RESPONSES TO PHQ QUESTIONS 1-9: 2

## 2022-03-23 ASSESSMENT — PAIN SCALES - GENERAL: PAINLEVEL: MODERATE PAIN (4)

## 2022-03-23 NOTE — PROGRESS NOTES
Assessment & Plan   Problem List Items Addressed This Visit        Digestive    Intractable nausea and vomiting       Behavioral    ADHD    Relevant Medications    methylphenidate (RITALIN LA) 40 MG 24 hr capsule (Start on 4/21/2022)    methylphenidate (RITALIN LA) 40 MG 24 hr capsule    methylphenidate (RITALIN LA) 40 MG 24 hr capsule (Start on 5/20/2022)       Other    Controlled substance agreement signed 2/20/18, 7/2/19, 10/26/2020    Relevant Medications    methylphenidate (RITALIN LA) 40 MG 24 hr capsule (Start on 4/21/2022)    methylphenidate (RITALIN LA) 40 MG 24 hr capsule      Other Visit Diagnoses     Hospital discharge follow-up    -  Primary    Epigastric pain        Relevant Orders    CBC and Differential (Completed)    US Abdomen Limited    Adult Gastro Ref - Consult Only    RUQ abdominal pain        Relevant Orders    CBC and Differential (Completed)    US Abdomen Limited    Adult Gastro Ref - Consult Only    Non-intractable vomiting with nausea, unspecified vomiting type        Relevant Orders    CBC and Differential (Completed)    US Abdomen Limited    Adult Gastro Ref - Consult Only         Epigastric pain, nausea, and vomiting: Completed repeat CBC for monitoring.  Hemoglobin is improving.  No acute concerns at this time.  Ordered right upper quadrant ultrasound to rule out concerns.  If this is unremarkable I would recommend having a HIDA scan completed to rule out concerns with the gallbladder.  Referred to gastroenterology for consult.    ADHD: Refill methylphenidate 40 mg 24 capsules quantity 30 with 2 refills.  Patient was given the side effect profile and the safety concerns with using the controlled medication prescription.   Patient should not share the controlled medication with other people.   Urine drug screen: 12/7/2021, stable  Controlled substance agreement signed: 12/8/2021   website was reviewed.     PDMP Review       Value Time User    State PDMP site checked  Yes 3/24/2022   "2:24 PM Sari Langford PA-C               BMI:   Estimated body mass index is 34.19 kg/m  as calculated from the following:    Height as of this encounter: 1.62 m (5' 3.78\").    Weight as of this encounter: 89.7 kg (197 lb 12.8 oz).       See Patient Instructions    Return if symptoms worsen or fail to improve.    Sari Langford PA-C  [facility]    Ammon Weber is a 31 year old who presents for the following health issues  accompanied by her mother.    /80   Pulse 85   Temp 97.8  F (36.6  C)   Resp 18   Ht 1.62 m (5' 3.78\")   Wt 89.7 kg (197 lb 12.8 oz)   LMP 03/11/2022 (Exact Date)   SpO2 98%   Breastfeeding No   BMI 34.19 kg/m    Kelle Clifton LPN....................  3/23/2022   1:39 PM        [facility]       Hospital Follow-up Visit:    Hospital/Nursing Home/IP Rehab Facility: [facility]  Date of Admission: 3/15/22  Date of Discharge: 3/19/22  Reason(s) for Admission: nausea and vomiting      Was your hospitalization related to COVID-19? No   Problems taking medications regularly:  None  Medication changes since discharge: None  Problems adhering to non-medication therapy:  None    Summary of hospitalization:  [facility] discharge summary reviewed  Diagnostic Tests/Treatments reviewed.  Follow up needed: [facility]  Transitional care management phone call: 3/21/2022  Other Healthcare Providers Involved in Patient s Care:         None  Update since discharge: stable. Post Discharge Medication Reconciliation: discharge medications reconciled, continue medications without change.  Plan of care communicated with patient and family            Patient has been doing okay since home.  Here with her mother.  Keeping some food and fluids down.  Currently doing a liquid diet.  Was able to eat some bread.  Using her nausea medication.  Feels like it is not working.  Has had 1 bowel movement.  No blood in the stool.  Stomach still hurts especially in " "the epigastric region and right upper quadrant.  Epigastric pain with vomiting.  Currently taking Zofran twice daily and Compazine once or twice daily.  Feels like the Compazine works better.  Had an abdominal CT in the hospital.  No concerns were appreciated.  Wondering what is causing her pain.  Would like further work-up to rule out concerns.  Not lightheaded or dizzy.  No fevers, chills, cough or cold symptoms, diarrhea, constipation, urinary symptoms.    Review of Systems   Constitutional: Negative for chills and fever.   HENT: Negative for ear pain and sore throat.    Respiratory: Negative for cough, shortness of breath and wheezing.    Cardiovascular: Negative for chest pain and palpitations.   Gastrointestinal: Positive for abdominal pain, nausea and vomiting. Negative for constipation, diarrhea and hematochezia.   Genitourinary: Positive for vaginal discharge. Negative for dysuria, flank pain, frequency, hematuria and urgency.   Musculoskeletal: Negative for myalgias.   Skin: Negative for rash.   Neurological: Negative for dizziness and light-headedness.   Psychiatric/Behavioral: Negative.             Objective    /80   Pulse 85   Temp 97.8  F (36.6  C)   Resp 18   Ht 1.62 m (5' 3.78\")   Wt 89.7 kg (197 lb 12.8 oz)   LMP 03/11/2022 (Exact Date)   SpO2 98%   Breastfeeding No   BMI 34.19 kg/m    Body mass index is 34.19 kg/m .  Physical Exam  Vitals and nursing note reviewed.   Constitutional:       Appearance: Normal appearance.   HENT:      Head: Normocephalic and atraumatic.      Mouth/Throat:      Mouth: Mucous membranes are moist.      Pharynx: Oropharynx is clear.   Eyes:      Extraocular Movements: Extraocular movements intact.      Conjunctiva/sclera: Conjunctivae normal.      Pupils: Pupils are equal, round, and reactive to light.   Cardiovascular:      Rate and Rhythm: Normal rate and regular rhythm.      Heart sounds: Normal heart sounds.   Pulmonary:      Effort: Pulmonary effort is " normal.      Breath sounds: Normal breath sounds.   Abdominal:      General: Abdomen is flat. Bowel sounds are normal. There is no distension.      Palpations: Abdomen is soft.      Tenderness: There is abdominal tenderness. There is no right CVA tenderness, left CVA tenderness or guarding.      Comments: Mild epigastric and right upper quadrant abdominal pain with palpation.   Musculoskeletal:         General: Normal range of motion.   Skin:     General: Skin is warm and dry.   Neurological:      General: No focal deficit present.      Mental Status: She is alert and oriented to person, place, and time.   Psychiatric:         Mood and Affect: Mood normal.         Behavior: Behavior normal.                Answers for HPI/ROS submitted by the patient on 3/23/2022  If you checked off any problems, how difficult have these problems made it for you to do your work, take care of things at home, or get along with other people?: Not difficult at all  PHQ9 TOTAL SCORE: 2  ZOEY 7 TOTAL SCORE: 0

## 2022-03-24 ASSESSMENT — ENCOUNTER SYMPTOMS
COUGH: 0
SHORTNESS OF BREATH: 0
DIZZINESS: 0
SORE THROAT: 0
FREQUENCY: 0
VOMITING: 1
DYSURIA: 0
HEMATURIA: 0
NAUSEA: 1
CONSTIPATION: 0
HEMATOCHEZIA: 0
MYALGIAS: 0
LIGHT-HEADEDNESS: 0
PSYCHIATRIC NEGATIVE: 1
FLANK PAIN: 0
CHILLS: 0
PALPITATIONS: 0
WHEEZING: 0
ABDOMINAL PAIN: 1
FEVER: 0
DIARRHEA: 0

## 2022-03-24 ASSESSMENT — ANXIETY QUESTIONNAIRES: GAD7 TOTAL SCORE: 0

## 2022-03-24 ASSESSMENT — PATIENT HEALTH QUESTIONNAIRE - PHQ9: SUM OF ALL RESPONSES TO PHQ QUESTIONS 1-9: 2

## 2022-03-30 ENCOUNTER — TELEPHONE (OUTPATIENT)
Dept: OBGYN | Facility: OTHER | Age: 32
End: 2022-03-30
Payer: COMMERCIAL

## 2022-03-30 NOTE — TELEPHONE ENCOUNTER
Tia called and said that she has been talking with Dr. Larson and needs to schedule her next fertility appointments.  She is unsure exactly what she needs and the timing.  Please check with Dr. Larson and let me know what and when we should be scheduling.  Rocio Harrison on 3/30/2022 at 9:19 AM

## 2022-03-31 NOTE — TELEPHONE ENCOUNTER
Returned patients call and left a detailed message as she had given verbal consent to do so. Advised her to call on cycle day 1 and we would do Letrozole 2.5mg days 3-7 and US day 11 or 12 per Dr. Larson.   Jo-Ann Ward RN on 3/31/2022 at 11:24 AM

## 2022-04-05 ENCOUNTER — HOSPITAL ENCOUNTER (OUTPATIENT)
Dept: ULTRASOUND IMAGING | Facility: OTHER | Age: 32
Discharge: HOME | End: 2022-04-05
Attending: PHYSICIAN ASSISTANT | Admitting: PHYSICIAN ASSISTANT
Payer: COMMERCIAL

## 2022-04-05 ENCOUNTER — MYC MEDICAL ADVICE (OUTPATIENT)
Dept: FAMILY MEDICINE | Facility: OTHER | Age: 32
End: 2022-04-05
Payer: COMMERCIAL

## 2022-04-05 DIAGNOSIS — R10.11 RUQ ABDOMINAL PAIN: ICD-10-CM

## 2022-04-05 DIAGNOSIS — R11.2 NON-INTRACTABLE VOMITING WITH NAUSEA, UNSPECIFIED VOMITING TYPE: ICD-10-CM

## 2022-04-05 DIAGNOSIS — R10.13 EPIGASTRIC PAIN: ICD-10-CM

## 2022-04-05 DIAGNOSIS — R10.13 EPIGASTRIC PAIN: Primary | ICD-10-CM

## 2022-04-05 PROCEDURE — 76705 ECHO EXAM OF ABDOMEN: CPT

## 2022-04-06 ENCOUNTER — TELEPHONE (OUTPATIENT)
Dept: SURGERY | Facility: OTHER | Age: 32
End: 2022-04-06
Payer: COMMERCIAL

## 2022-04-12 ENCOUNTER — ALLIED HEALTH/NURSE VISIT (OUTPATIENT)
Dept: OBGYN | Facility: OTHER | Age: 32
End: 2022-04-12
Attending: OBSTETRICS & GYNECOLOGY
Payer: COMMERCIAL

## 2022-04-12 ENCOUNTER — HOSPITAL ENCOUNTER (OUTPATIENT)
Dept: ULTRASOUND IMAGING | Facility: OTHER | Age: 32
Discharge: HOME | End: 2022-04-12
Attending: OBSTETRICS & GYNECOLOGY
Payer: COMMERCIAL

## 2022-04-12 DIAGNOSIS — N97.9 FEMALE INFERTILITY: ICD-10-CM

## 2022-04-12 DIAGNOSIS — N97.0 ANOVULATION: Primary | ICD-10-CM

## 2022-04-12 PROCEDURE — 76857 US EXAM PELVIC LIMITED: CPT

## 2022-04-12 NOTE — NURSING NOTE
Patient present for follicle study results and injection education. 19 mm follicle on the right. Ovidrel ordered by Dr. Giana Baker. Patient obtained from retail pharmacy and returned for teaching. She was provided handouts and verbal instructions. Patient successfully administered Ovidrel. She will have timed intercourse tonight and again in 36-40 hours.     She was instructed to call/message if:  -she gets her period  -has a negative UPT and no period in 2 weeks  -has a positive pregnancy test after 4/26/22.    Patient verbalized understanding and was encouraged to reach out with any further questions.    Morena Hale RN...................4/12/2022 3:43 PM

## 2022-04-26 ENCOUNTER — MYC MEDICAL ADVICE (OUTPATIENT)
Dept: OBGYN | Facility: OTHER | Age: 32
End: 2022-04-26
Payer: COMMERCIAL

## 2022-04-26 DIAGNOSIS — N97.9 FEMALE INFERTILITY: ICD-10-CM

## 2022-04-26 DIAGNOSIS — N97.0 ANOVULATION: Primary | ICD-10-CM

## 2022-04-29 RX ORDER — LETROZOLE 2.5 MG/1
2.5 TABLET, FILM COATED ORAL DAILY
Qty: 5 TABLET | Refills: 0 | Status: SHIPPED | OUTPATIENT
Start: 2022-04-29 | End: 2023-04-04

## 2022-05-09 ENCOUNTER — ALLIED HEALTH/NURSE VISIT (OUTPATIENT)
Dept: OBGYN | Facility: OTHER | Age: 32
End: 2022-05-09
Attending: FAMILY MEDICINE
Payer: COMMERCIAL

## 2022-05-09 ENCOUNTER — HOSPITAL ENCOUNTER (OUTPATIENT)
Dept: ULTRASOUND IMAGING | Facility: OTHER | Age: 32
Discharge: HOME | End: 2022-05-09
Attending: OBSTETRICS & GYNECOLOGY
Payer: COMMERCIAL

## 2022-05-09 DIAGNOSIS — N97.0 ANOVULATION: ICD-10-CM

## 2022-05-09 DIAGNOSIS — N97.9 FEMALE INFERTILITY: ICD-10-CM

## 2022-05-09 DIAGNOSIS — N97.9 FEMALE INFERTILITY: Primary | ICD-10-CM

## 2022-05-09 PROCEDURE — 99207 PR NO CHARGE NURSE ONLY: CPT

## 2022-05-09 PROCEDURE — 76857 US EXAM PELVIC LIMITED: CPT

## 2022-05-09 NOTE — PROGRESS NOTES
Ovidrel trigger rx given. TI give:    You should start intercourse the day of your Ovidrel injection and the following two days after that. Please be aware that Ovidrel injection contains HCG and this will cause a pregnancy test to be falsely positive. Please wait 2 weeks to take a pregnancy test. If you have reached day 35 of your cycle and have not started bleeding, take a pregnancy test, then call or message with the results. You may be instructed to take a medication to induce a period.     Gertrudis Davila RN on 5/9/2022 at 10:51 AM

## 2022-05-20 DIAGNOSIS — F41.1 GENERALIZED ANXIETY DISORDER: ICD-10-CM

## 2022-05-23 RX ORDER — CLONAZEPAM 1 MG/1
TABLET ORAL
Qty: 60 TABLET | Refills: 5 | Status: SHIPPED | OUTPATIENT
Start: 2022-05-23

## 2022-05-23 NOTE — TELEPHONE ENCOUNTER
[facility] sent Rx request for the following:      CLONAZEPAM 1MG TABLETS      Last Prescription Date:   11/10/2021  Last Fill Qty/Refills:         60, R-5    Last Office Visit:              3/23/2022   Future Office visit:           none    rFed Grover RN, BSN  ....................  5/23/2022   3:10 PM

## 2022-08-02 ENCOUNTER — TELEPHONE (OUTPATIENT)
Dept: FAMILY MEDICINE | Facility: OTHER | Age: 32
End: 2022-08-02

## 2022-08-02 NOTE — TELEPHONE ENCOUNTER
Patient would like to change pharmacy to [facility] [address].  prochlorperazine (COMPAZINE) 10 MG tablet  Amitriptyline 10 mg 1 tablet at bed time  ondansetron (ZOFRAN-ODT) 4 MG ODT tab  triamcinolone (KENALOG) 0.1 % external cream   omeprazole (PRILOSEC OTC) 20 MG EC tablet     Any questions please call patient back. Thank you   Jennifer Carrillo on 8/2/2022 at 4:17 PM

## 2022-08-10 DIAGNOSIS — R11.2 INTRACTABLE NAUSEA AND VOMITING: ICD-10-CM

## 2022-08-11 RX ORDER — PROCHLORPERAZINE MALEATE 10 MG
TABLET ORAL
Qty: 30 TABLET | Refills: 3 | Status: SHIPPED | OUTPATIENT
Start: 2022-08-11 | End: 2022-09-07

## 2022-08-11 NOTE — TELEPHONE ENCOUNTER
[facility] sent Rx request for the following:      Requested Prescriptions   Pending Prescriptions Disp Refills     prochlorperazine (COMPAZINE) 10 MG tablet [Pharmacy Med Name: PROCHLORPERAZINE 10MG TABLETS] 30 tablet 3     Sig: TAKE 1 TABLET(10 MG) BY MOUTH EVERY 6 HOURS AS NEEDED FOR NAUSEA OR VOMITING        Antivertigo/Antiemetic Agents Failed - 8/11/2022 12:40 PM        Failed - Recent (12 mo) or future (30 days) visit within the authorizing provider's specialty     Last Prescription Date:   3/19/22  Last Fill Qty/Refills:         30, R-3 (Prescribed by Dr. De Guzman)    Last Office Visit:              3/23/22   Future Office visit:           None    In clinical absence of patient's primary, Nicho Peoples, patient is requesting that this message be sent to the covering provider for consideration please.    Shannan Villanueva RN .............. 8/11/2022  12:44 PM

## 2022-09-05 DIAGNOSIS — R11.2 INTRACTABLE NAUSEA AND VOMITING: ICD-10-CM

## 2022-09-07 RX ORDER — PROCHLORPERAZINE MALEATE 10 MG
TABLET ORAL
Qty: 30 TABLET | Refills: 3 | Status: SHIPPED | OUTPATIENT
Start: 2022-09-07 | End: 2022-12-19

## 2022-09-07 NOTE — TELEPHONE ENCOUNTER
[facility] sent Rx request for the following:      PROCHLORPERAZINE 10MG TABLETS      Last Prescription Date:   8/11/2022  Last Fill Qty/Refills:         30, R-3    Last Office Visit:              3/23/2022   Future Office visit:           none    Fred Grover RN, BSN  ....................  9/7/2022   1:55 PM        
within normal limits

## 2022-09-11 ENCOUNTER — HEALTH MAINTENANCE LETTER (OUTPATIENT)
Age: 32
End: 2022-09-11

## 2022-09-15 NOTE — NURSING NOTE
Tia Vázquez is a 27 year old female who presents today for wound care of the buttocks.  Niya Matta LPN.......2/26/2018.......7:51 AM     Pt presents to ED ambulatory from home with c/o Laceration to R hand s/p trip and fall. Pt reports she was walking when she tripped on uneven pavement causing her to fall forward. Pt denies head strike, LOC or anticoagulant use. Pt arrives with bleeding controlled.

## 2022-10-10 ENCOUNTER — TELEPHONE (OUTPATIENT)
Dept: OBGYN | Facility: OTHER | Age: 32
End: 2022-10-10

## 2022-10-10 DIAGNOSIS — N97.9 FEMALE INFERTILITY: Primary | ICD-10-CM

## 2022-10-10 RX ORDER — LETROZOLE 2.5 MG/1
2.5 TABLET, FILM COATED ORAL DAILY
Qty: 5 TABLET | Refills: 0 | Status: SHIPPED | OUTPATIENT
Start: 2022-10-10 | End: 2023-04-04

## 2022-10-10 NOTE — TELEPHONE ENCOUNTER
"Per Dr. Larson's last fertility office visit consult notation: \"letrozole, midcycle ultrasound for follicle tracking, Ovidrel ovulation induction, plus or minus artificial insemination for up to 6 ovulatory cycles.\"     Call made to patient, left message for return call. Sandy Cespedes RN ....................  10/10/2022   11:18 AM      "

## 2022-10-10 NOTE — TELEPHONE ENCOUNTER
Tia left a voicemail that she started her period last night and would like to get set up for her IUI.  Rocio Harrison on 10/10/2022 at 9:34 AM

## 2022-10-10 NOTE — TELEPHONE ENCOUNTER
After verification of name and date of birth, patient notified of plan and education for IUI planning, orders placed per Dr. Larson. Patient directed to schedule US on day 11 or 12 of cycle with nurse only visit to follow. Patient verbalizes understanding and has no further questions or concerns. Sandy Cespedes RN ....................  10/10/2022   4:33 PM

## 2022-10-24 ENCOUNTER — E-VISIT (OUTPATIENT)
Dept: URGENT CARE | Facility: CLINIC | Age: 32
End: 2022-10-24
Payer: COMMERCIAL

## 2022-10-24 ENCOUNTER — OFFICE VISIT (OUTPATIENT)
Dept: FAMILY MEDICINE | Facility: OTHER | Age: 32
End: 2022-10-24
Attending: NURSE PRACTITIONER
Payer: COMMERCIAL

## 2022-10-24 VITALS
SYSTOLIC BLOOD PRESSURE: 128 MMHG | OXYGEN SATURATION: 96 % | BODY MASS INDEX: 37.18 KG/M2 | TEMPERATURE: 97.9 F | WEIGHT: 215.1 LBS | HEART RATE: 126 BPM | DIASTOLIC BLOOD PRESSURE: 78 MMHG

## 2022-10-24 DIAGNOSIS — H65.191 ACUTE OTITIS MEDIA WITH EFFUSION OF RIGHT EAR: ICD-10-CM

## 2022-10-24 DIAGNOSIS — R05.1 ACUTE COUGH: Primary | ICD-10-CM

## 2022-10-24 DIAGNOSIS — R50.9 FEVER, UNSPECIFIED FEVER CAUSE: ICD-10-CM

## 2022-10-24 DIAGNOSIS — J22 LOWER RESPIRATORY INFECTION (E.G., BRONCHITIS, PNEUMONIA, PNEUMONITIS, PULMONITIS): ICD-10-CM

## 2022-10-24 PROCEDURE — 99213 OFFICE O/P EST LOW 20 MIN: CPT | Performed by: NURSE PRACTITIONER

## 2022-10-24 PROCEDURE — 99207 PR NON-BILLABLE SERV PER CHARTING: CPT | Performed by: EMERGENCY MEDICINE

## 2022-10-24 ASSESSMENT — PAIN SCALES - GENERAL: PAINLEVEL: SEVERE PAIN (7)

## 2022-10-24 NOTE — PROGRESS NOTES
ASSESSMENT/PLAN:    I have reviewed the nursing notes.  I have reviewed the findings, diagnosis, plan and need for follow up with the patient.    1. Acute cough  2. Fever, unspecified fever cause  3. Acute otitis media with effusion of right ear  Severe right middle ear infection; treating with augmentin for 10 days due to severity. It is also suspected that she has a secondary pneumonia; she had covid back 3 weeks ago and has still been having fevers and a significant cough that is persistent. As I am treating her with Augmentin for the ear today; offered chest xr but also explained to patient that it would likely not change treatment plan and she prefers to forego chest xr. Her oxygen is stable at 96%. I recommend follow up if not improving within 1 week. She is receptive to this. Encourage oral intake.   - amoxicillin-clavulanate (AUGMENTIN) 875-125 MG tablet; Take 1 tablet by mouth 2 times daily for 10 days  Dispense: 20 tablet; Refill: 0    4. Lower respiratory infection (e.g., bronchitis, pneumonia, pneumonitis, pulmonitis)  - amoxicillin-clavulanate (AUGMENTIN) 875-125 MG tablet; Take 1 tablet by mouth 2 times daily for 10 days  Dispense: 20 tablet; Refill: 0  -May use over-the-counter Tylenol or ibuprofen PRN    Discussed warning signs/symptoms indicative of need to f/u    Follow up if symptoms persist or worsen or concerns    I explained my diagnostic considerations and recommendations to the patient, who voiced understanding and agreement with the treatment plan. All questions were answered. We discussed potential side effects of any prescribed or recommended therapies, as well as expectations for response to treatments.    Jen Stephenson NP  10/24/2022  11:39 AM    HPI:  Tia Sethi is a 31 year old female who presents to [facility] today for concerns of ear pain and URI symptoms x3 weeks after diagnosis of covid. No improvement. She also has severe right ear pain that is new in the past day or  two. Difficult to hear from this ear, feels plugged. She feels worse now than she did initially. She is coughing so hard she is vomiting. Every time she eats or drinks she vomits. Last fever was today, 101. Took theraflu since. Has been taking theraflu 3x per day every day. She had negative covid test on 10/24/2022. Productive cough. Body aches, chills, fatigue ongoing. No good days since positive covid. Covid positive early October.     Today was first day trying to return to work; she got sent home due to her cough and fever.     ROS otherwise negative     Past Medical History:   Diagnosis Date     Helicobacter pylori infection     No Comments Provided     Past Surgical History:   Procedure Laterality Date     ESOPHAGOSCOPY, GASTROSCOPY, DUODENOSCOPY (EGD), COMBINED N/A 3/18/2022    Procedure: ESOPHAGOGASTRODUODENOSCOPY, WITH BIOPSY;  Surgeon: Osmar Velez MD;  Location: [facility] OR     exision mass retrorectal and coccyx  06/09/2017    Ori Espinoza MD at [facility]     Social History     Tobacco Use     Smoking status: Never     Smokeless tobacco: Never   Substance Use Topics     Alcohol use: Not Currently     Alcohol/week: 4.2 standard drinks     Current Outpatient Medications   Medication Sig Dispense Refill     clonazePAM (KLONOPIN) 1 MG tablet TAKE 1/2 TO 1 TABLET BY MOUTH TWICE DAILY AS NEEDED FOR ANXIETY 60 tablet 5     letrozole (FEMARA) 2.5 MG tablet Take 1 tablet (2.5 mg) by mouth daily 5 tablet 0     letrozole (FEMARA) 2.5 MG tablet Take 1 tablet (2.5 mg) by mouth daily 5 tablet 0     methylphenidate (RITALIN LA) 40 MG 24 hr capsule Take 1 capsule (40 mg) by mouth daily 30 capsule 0     methylphenidate (RITALIN LA) 40 MG 24 hr capsule Take 1 capsule (40 mg) by mouth daily 30 capsule 0     methylphenidate (RITALIN LA) 40 MG 24 hr capsule Take 1 capsule (40 mg) by mouth every morning 30 capsule 0     naproxen (NAPROSYN) 500 MG tablet Take 500 mg by mouth 2 times daily as needed for moderate pain        omeprazole (PRILOSEC OTC) 20 MG EC tablet Take 1 tablet (20 mg) by mouth daily       ondansetron (ZOFRAN-ODT) 4 MG ODT tab Take 1 tablet (4 mg) by mouth every 8 hours as needed for nausea 30 tablet 4     Prenatal MV & Min w/FA-DHA ([employer] PRENATAL GUMMY PO) Take 1 chew tab by mouth daily       prochlorperazine (COMPAZINE) 10 MG tablet TAKE 1 TABLET(10 MG) BY MOUTH EVERY 6 HOURS AS NEEDED FOR NAUSEA OR VOMITING 30 tablet 3     triamcinolone (KENALOG) 0.1 % external cream Apply topically 3 times daily Apply  topically to affected area(s) 3 times daily. 80 g 3     choriogonadotropin davon (OVIDREL) 250 MCG/0.5ML injection Inject 0.5 mLs (250 mcg) Subcutaneous once for 1 dose 0.5 mL 0     choriogonadotropin davon (OVIDREL) 250 MCG/0.5ML injection Inject 0.5 mLs (250 mcg) Subcutaneous once for 1 dose 0.5 mL 0     escitalopram (LEXAPRO) 20 MG tablet Take 1 tablet (20 mg) by mouth daily (Patient not taking: Reported on 10/24/2022) 30 tablet 11     QUEtiapine (SEROQUEL) 25 MG tablet Take 1-2 tablets (25-50 mg) by mouth At Bedtime (Patient not taking: Reported on 10/24/2022) 60 tablet 11     Allergies   Allergen Reactions     Other  [No Clinical Screening - See Comments]      Other reaction(s): Throat Irritation  Carmex, Pine, Icy Hot     [employer] Cleansing Skin      Other reaction(s): Swelling     Menthol      Other reaction(s): Swelling     Pine Tar      Other reaction(s): Runny Nose     Past medical history, past surgical history, current medications and allergies reviewed and accurate to the best of my knowledge.      ROS:  Refer to HPI    /78 (BP Location: Right arm, Patient Position: Sitting, Cuff Size: Adult Large)   Pulse (!) 126   Temp 97.9  F (36.6  C) (Temporal)   Wt 97.6 kg (215 lb 1.6 oz)   LMP 10/15/2022 (Approximate)   SpO2 96%   BMI 37.18 kg/m      EXAM:  General Appearance: Ill appearing 31 year old female, appropriate appearance for age. No acute distress   Ears: Left TM intact, translucent with bony  landmarks appreciated, no erythema, no effusion, no bulging, no purulence.  Right TM intact, cannot visualize bony landmarks appreciated, + erythema, no effusion, + bulging, + purulence.  Left auditory canal clear.  Right auditory canal clear.  Normal external ears, non tender.  Eyes: conjunctivae normal without erythema or irritation, corneas clear, no drainage or crusting, no eyelid swelling, pupils equal   Oropharynx: moist mucous membranes, posterior pharynx without erythema, tonsils symmetric, no erythema, no exudates or petechiae, no post nasal drip seen, no trismus, voice clear.    Sinuses:  No sinus tenderness upon palpation of the frontal or maxillary sinuses  Nose:  Bilateral nares: no erythema, no edema, no drainage or congestion   Neck: supple without adenopathy  Respiratory: normal chest wall and respirations.  Normal effort. Lung sounds are coarse to auscultation of the posterior bases bilaterally, no wheezing,  No crackles or rhonchi.  No increased work of breathing. + frequent cough appreciated.  Cardiac: RRR with no murmurs  Psychological: normal affect, alert, oriented, and pleasant.

## 2022-10-24 NOTE — PATIENT INSTRUCTIONS
Dear Tia Sethi,    We are sorry you are not feeling well. Based on the responses you provided, it is recommended that you be seen in-person in urgent care so we can better evaluate your symptoms. Please click here to find the nearest urgent care location to you.   You will not be charged for this Visit. Thank you for trusting us with your care.    Franki Stuart MD

## 2022-10-24 NOTE — LETTER
October 24, 2022                                                                     To Whom it May Concern:    Tia Sethi attended clinic here on Oct 24, 2022. She may return on Wednesday 10/26/2022 as long as she is feeling improvement.       Sincerely,    Jen Stephenson NP

## 2022-10-24 NOTE — NURSING NOTE
"Chief Complaint   Patient presents with     URI     Ongoing symptoms for 3 weeks after diagnosis of COVID. No improvement, right ear pain.       Initial /78 (BP Location: Right arm, Patient Position: Sitting, Cuff Size: Adult Large)   Pulse (!) 126   Temp 97.9  F (36.6  C) (Temporal)   Wt 97.6 kg (215 lb 1.6 oz)   LMP 10/15/2022 (Approximate)   SpO2 96%   BMI 37.18 kg/m   Estimated body mass index is 37.18 kg/m  as calculated from the following:    Height as of 3/23/22: 1.62 m (5' 3.78\").    Weight as of this encounter: 97.6 kg (215 lb 1.6 oz).      Medication Reconciliation: complete    FOOD SECURITY SCREENING QUESTIONS:      The next two questions are to help us understand your food security.  If you are feeling you need any assistance in this area, we have resources available to support you today.    Hunger Vital Signs:  Within the past 12 months we worried whether our food would run out before we got money to buy more. Never  Within the past 12 months the food we bought just didn't last and we didn't have money to get more. Never    Kelle Clifton LPN....................  10/24/2022   11:29 AM    "

## 2022-12-15 DIAGNOSIS — R11.2 INTRACTABLE NAUSEA AND VOMITING: ICD-10-CM

## 2022-12-16 NOTE — TELEPHONE ENCOUNTER
"  Last Prescription Date: 9/7/22  Last Qty/Refills: 30 / 3  Last Office Visit: 3/23/22 [facility]  Future Office Visit: none     Requested Prescriptions   Pending Prescriptions Disp Refills     prochlorperazine (COMPAZINE) 10 MG tablet [Pharmacy Med Name: PROCHLORPERAZINE 10MG TABLETS] 30 tablet 3     Sig: TAKE 1 TABLET(10 MG) BY MOUTH EVERY 6 HOURS AS NEEDED FOR NAUSEA OR VOMITING        Antivertigo/Antiemetic Agents Passed - 12/15/2022  2:03 PM        Passed - Recent (12 mo) or future (30 days) visit within the authorizing provider's specialty     Patient has had an office visit with the authorizing provider or a provider within the authorizing providers department within the previous 12 mos or has a future within next 30 days. See \"Patient Info\" tab in inbasket, or \"Choose Columns\" in Meds & Orders section of the refill encounter.              Passed - Medication is active on med list        Passed - Patient is 18 years of age or older               "

## 2022-12-19 DIAGNOSIS — R11.2 INTRACTABLE NAUSEA AND VOMITING: ICD-10-CM

## 2022-12-19 RX ORDER — PROCHLORPERAZINE MALEATE 10 MG
TABLET ORAL
Qty: 30 TABLET | Refills: 3 | Status: SHIPPED | OUTPATIENT
Start: 2022-12-19 | End: 2023-01-25

## 2022-12-20 RX ORDER — PROCHLORPERAZINE MALEATE 10 MG
TABLET ORAL
Qty: 30 TABLET | Refills: 3 | OUTPATIENT
Start: 2022-12-20

## 2022-12-20 NOTE — TELEPHONE ENCOUNTER
[facility] sent Rx request for the following:    PROCHLORPERAZINE 10MG TABLETS  Last Prescription Date:   12/19/22  Last Fill Qty/Refills:         30, R-3    Last Office Visit:              3/23/22   Future Office visit:           None   The original prescription was reordered on 12/19/2022 by Nicho Peoples MD. Renewing this prescription may not be appropriate.  Redundant refill request refused: Too soon:  Karmen Mack RN on 12/20/2022 at 9:33 AM

## 2023-01-23 DIAGNOSIS — R11.2 INTRACTABLE NAUSEA AND VOMITING: ICD-10-CM

## 2023-01-25 RX ORDER — PROCHLORPERAZINE MALEATE 10 MG
TABLET ORAL
Qty: 30 TABLET | Refills: 3 | Status: SHIPPED | OUTPATIENT
Start: 2023-01-25 | End: 2023-04-04

## 2023-01-25 NOTE — TELEPHONE ENCOUNTER
[facility] sent Rx request for the following:      Requested Prescriptions   Pending Prescriptions Disp Refills     prochlorperazine (COMPAZINE) 10 MG tablet [Pharmacy Med Name: PROCHLORPERAZINE 10MG TABLETS] 30 tablet 3     Sig: TAKE 1 TABLET(10 MG) BY MOUTH EVERY 6 HOURS AS NEEDED FOR NAUSEA OR VOMITING   Last Prescription Date:   12/19/22  Last Fill Qty/Refills:         30, R-3    Last Office Visit:              3/23/22   Future Office visit:           None    Shannan Villanueva RN .............. 1/25/2023  3:59 PM

## 2023-03-08 ENCOUNTER — E-VISIT (OUTPATIENT)
Dept: FAMILY MEDICINE | Facility: OTHER | Age: 33
End: 2023-03-08
Payer: COMMERCIAL

## 2023-03-08 DIAGNOSIS — H66.93 BILATERAL OTITIS MEDIA, UNSPECIFIED OTITIS MEDIA TYPE: Primary | ICD-10-CM

## 2023-03-08 PROCEDURE — 99421 OL DIG E/M SVC 5-10 MIN: CPT | Performed by: FAMILY MEDICINE

## 2023-03-08 RX ORDER — CEFDINIR 300 MG/1
300 CAPSULE ORAL 2 TIMES DAILY
Qty: 20 CAPSULE | Refills: 0 | Status: SHIPPED | OUTPATIENT
Start: 2023-03-08 | End: 2023-03-18

## 2023-03-08 NOTE — PATIENT INSTRUCTIONS
Thank you for choosing us for your care. I have placed an order for a prescription so that you can start treatment. View your full visit summary for details by clicking on the link below. Your pharmacist will able to address any questions you may have about the medication.     If you're not feeling better within 5-7 days, please schedule an appointment.  You can schedule an appointment right here in [facility], or call [phone]  If the visit is for the same symptoms as your eVisit, we'll refund the cost of your eVisit if seen within seven days.

## 2023-04-04 ENCOUNTER — MYC MEDICAL ADVICE (OUTPATIENT)
Dept: FAMILY MEDICINE | Facility: OTHER | Age: 33
End: 2023-04-04
Payer: COMMERCIAL

## 2023-04-04 ENCOUNTER — HOSPITAL ENCOUNTER (OUTPATIENT)
Dept: GENERAL RADIOLOGY | Facility: OTHER | Age: 33
Discharge: HOME | End: 2023-04-04
Attending: FAMILY MEDICINE
Payer: COMMERCIAL

## 2023-04-04 ENCOUNTER — OFFICE VISIT (OUTPATIENT)
Dept: FAMILY MEDICINE | Facility: OTHER | Age: 33
End: 2023-04-04
Attending: FAMILY MEDICINE
Payer: COMMERCIAL

## 2023-04-04 VITALS
HEART RATE: 110 BPM | TEMPERATURE: 97.8 F | WEIGHT: 218.4 LBS | RESPIRATION RATE: 18 BRPM | HEIGHT: 64 IN | OXYGEN SATURATION: 97 % | DIASTOLIC BLOOD PRESSURE: 98 MMHG | SYSTOLIC BLOOD PRESSURE: 132 MMHG | BODY MASS INDEX: 37.28 KG/M2

## 2023-04-04 DIAGNOSIS — R10.84 ABDOMINAL PAIN, GENERALIZED: ICD-10-CM

## 2023-04-04 DIAGNOSIS — R11.2 NAUSEA AND VOMITING, UNSPECIFIED VOMITING TYPE: ICD-10-CM

## 2023-04-04 DIAGNOSIS — R10.84 ABDOMINAL PAIN, GENERALIZED: Primary | ICD-10-CM

## 2023-04-04 LAB
ALBUMIN SERPL BCG-MCNC: 4.4 G/DL (ref 3.5–5.2)
ALBUMIN UR-MCNC: 30 MG/DL
ALP SERPL-CCNC: 75 U/L (ref 35–104)
ALT SERPL W P-5'-P-CCNC: 25 U/L (ref 10–35)
ANION GAP SERPL CALCULATED.3IONS-SCNC: 11 MMOL/L (ref 7–15)
APPEARANCE UR: CLEAR
AST SERPL W P-5'-P-CCNC: 21 U/L (ref 10–35)
BACTERIA #/AREA URNS HPF: ABNORMAL /HPF
BASOPHILS # BLD AUTO: 0.1 10E3/UL (ref 0–0.2)
BASOPHILS NFR BLD AUTO: 1 %
BILIRUB SERPL-MCNC: 0.4 MG/DL
BILIRUB UR QL STRIP: NEGATIVE
BUN SERPL-MCNC: 9.6 MG/DL (ref 6–20)
CALCIUM SERPL-MCNC: 9.2 MG/DL (ref 8.6–10)
CHLORIDE SERPL-SCNC: 103 MMOL/L (ref 98–107)
COLOR UR AUTO: YELLOW
CREAT SERPL-MCNC: 0.82 MG/DL (ref 0.51–0.95)
DEPRECATED HCO3 PLAS-SCNC: 24 MMOL/L (ref 22–29)
EOSINOPHIL # BLD AUTO: 0.1 10E3/UL (ref 0–0.7)
EOSINOPHIL NFR BLD AUTO: 1 %
ERYTHROCYTE [DISTWIDTH] IN BLOOD BY AUTOMATED COUNT: 20.2 % (ref 10–15)
GFR SERPL CREATININE-BSD FRML MDRD: >90 ML/MIN/1.73M2
GLUCOSE SERPL-MCNC: 102 MG/DL (ref 70–99)
GLUCOSE UR STRIP-MCNC: NEGATIVE MG/DL
HCT VFR BLD AUTO: 38.1 % (ref 35–47)
HGB BLD-MCNC: 11.6 G/DL (ref 11.7–15.7)
HGB UR QL STRIP: ABNORMAL
HOLD SPECIMEN: NORMAL
HYALINE CASTS: 6 /LPF
IMM GRANULOCYTES # BLD: 0 10E3/UL
IMM GRANULOCYTES NFR BLD: 0 %
KETONES UR STRIP-MCNC: NEGATIVE MG/DL
LEUKOCYTE ESTERASE UR QL STRIP: NEGATIVE
LIPASE SERPL-CCNC: 38 U/L (ref 13–60)
LYMPHOCYTES # BLD AUTO: 2.8 10E3/UL (ref 0.8–5.3)
LYMPHOCYTES NFR BLD AUTO: 27 %
MCH RBC QN AUTO: 22.1 PG (ref 26.5–33)
MCHC RBC AUTO-ENTMCNC: 30.4 G/DL (ref 31.5–36.5)
MCV RBC AUTO: 73 FL (ref 78–100)
MONOCYTES # BLD AUTO: 0.7 10E3/UL (ref 0–1.3)
MONOCYTES NFR BLD AUTO: 7 %
MUCOUS THREADS #/AREA URNS LPF: PRESENT /LPF
NEUTROPHILS # BLD AUTO: 6.6 10E3/UL (ref 1.6–8.3)
NEUTROPHILS NFR BLD AUTO: 64 %
NITRATE UR QL: NEGATIVE
NRBC # BLD AUTO: 0 10E3/UL
NRBC BLD AUTO-RTO: 0 /100
PH UR STRIP: 6.5 [PH] (ref 5–9)
PLATELET # BLD AUTO: 517 10E3/UL (ref 150–450)
POTASSIUM SERPL-SCNC: 4 MMOL/L (ref 3.4–5.3)
PROT SERPL-MCNC: 8.3 G/DL (ref 6.4–8.3)
RBC # BLD AUTO: 5.25 10E6/UL (ref 3.8–5.2)
RBC URINE: 27 /HPF
SODIUM SERPL-SCNC: 138 MMOL/L (ref 136–145)
SP GR UR STRIP: 1.02 (ref 1–1.03)
SQUAMOUS EPITHELIAL: 3 /HPF
UROBILINOGEN UR STRIP-MCNC: NORMAL MG/DL
WBC # BLD AUTO: 10.4 10E3/UL (ref 4–11)
WBC URINE: 1 /HPF

## 2023-04-04 PROCEDURE — 96372 THER/PROPH/DIAG INJ SC/IM: CPT | Performed by: FAMILY MEDICINE

## 2023-04-04 PROCEDURE — 83690 ASSAY OF LIPASE: CPT | Mod: ZL | Performed by: FAMILY MEDICINE

## 2023-04-04 PROCEDURE — 80053 COMPREHEN METABOLIC PANEL: CPT | Mod: ZL | Performed by: FAMILY MEDICINE

## 2023-04-04 PROCEDURE — 87086 URINE CULTURE/COLONY COUNT: CPT | Mod: ZL | Performed by: FAMILY MEDICINE

## 2023-04-04 PROCEDURE — 81001 URINALYSIS AUTO W/SCOPE: CPT | Mod: ZL | Performed by: FAMILY MEDICINE

## 2023-04-04 PROCEDURE — 85025 COMPLETE CBC W/AUTO DIFF WBC: CPT | Mod: ZL | Performed by: FAMILY MEDICINE

## 2023-04-04 PROCEDURE — 99215 OFFICE O/P EST HI 40 MIN: CPT | Performed by: FAMILY MEDICINE

## 2023-04-04 PROCEDURE — 250N000011 HC RX IP 250 OP 636: Performed by: FAMILY MEDICINE

## 2023-04-04 PROCEDURE — 36415 COLL VENOUS BLD VENIPUNCTURE: CPT | Mod: ZL | Performed by: FAMILY MEDICINE

## 2023-04-04 PROCEDURE — 74019 RADEX ABDOMEN 2 VIEWS: CPT

## 2023-04-04 RX ADMIN — PROCHLORPERAZINE EDISYLATE 10 MG: 5 INJECTION, SOLUTION INTRAMUSCULAR; INTRAVENOUS at 11:20

## 2023-04-04 ASSESSMENT — ANXIETY QUESTIONNAIRES
7. FEELING AFRAID AS IF SOMETHING AWFUL MIGHT HAPPEN: NOT AT ALL
8. IF YOU CHECKED OFF ANY PROBLEMS, HOW DIFFICULT HAVE THESE MADE IT FOR YOU TO DO YOUR WORK, TAKE CARE OF THINGS AT HOME, OR GET ALONG WITH OTHER PEOPLE?: SOMEWHAT DIFFICULT
1. FEELING NERVOUS, ANXIOUS, OR ON EDGE: SEVERAL DAYS
7. FEELING AFRAID AS IF SOMETHING AWFUL MIGHT HAPPEN: NOT AT ALL
3. WORRYING TOO MUCH ABOUT DIFFERENT THINGS: NOT AT ALL
2. NOT BEING ABLE TO STOP OR CONTROL WORRYING: SEVERAL DAYS
GAD7 TOTAL SCORE: 2
GAD7 TOTAL SCORE: 2
6. BECOMING EASILY ANNOYED OR IRRITABLE: NOT AT ALL
5. BEING SO RESTLESS THAT IT IS HARD TO SIT STILL: NOT AT ALL
IF YOU CHECKED OFF ANY PROBLEMS ON THIS QUESTIONNAIRE, HOW DIFFICULT HAVE THESE PROBLEMS MADE IT FOR YOU TO DO YOUR WORK, TAKE CARE OF THINGS AT HOME, OR GET ALONG WITH OTHER PEOPLE: SOMEWHAT DIFFICULT
4. TROUBLE RELAXING: NOT AT ALL

## 2023-04-04 ASSESSMENT — PAIN SCALES - GENERAL: PAINLEVEL: SEVERE PAIN (7)

## 2023-04-04 ASSESSMENT — ENCOUNTER SYMPTOMS
HEADACHES: 1
ABDOMINAL PAIN: 1
FEVER: 1

## 2023-04-04 NOTE — PROGRESS NOTES
"Nursing Notes:   Jen Russell CMA  4/4/2023 10:37 AM  Sign at exiting of workspace  Chief Complaint   Patient presents with     Emesis     Headache     Abdominal Pain     Fever     Patient is here for vomiting (pink color to it), headache, fever, and abdominal pain. Patient had same symptoms 2 weeks ago. She was not seen. Symptoms restarted this past weekend.     Initial BP (!) 144/100   Pulse 110   Temp 97.8  F (36.6  C) (Tympanic)   Resp 18   Ht 1.619 m (5' 3.75\")   Wt 99.1 kg (218 lb 6.4 oz)   LMP 04/03/2023 (Exact Date)   SpO2 97%   Breastfeeding No   BMI 37.78 kg/m   Estimated body mass index is 37.78 kg/m  as calculated from the following:    Height as of this encounter: 1.619 m (5' 3.75\").    Weight as of this encounter: 99.1 kg (218 lb 6.4 oz).  Medication Reconciliation: complete    Jen Russell CMA       FOOD SECURITY SCREENING QUESTIONS:    The next two questions are to help us understand your food security.  If you are feeling you need any assistance in this area, we have resources available to support you today.    Hunger Vital Signs:  Within the past 12 months we worried whether our food would run out before we got money to buy more. Never  Within the past 12 months the food we bought just didn't last and we didn't have money to get more. Never  Jen Russell CMA,LPN on 4/4/2023 at 10:36 AM        Ammon Weber is a 32 year old, presenting for the following health issues:  Emesis, Headache, Abdominal Pain, and Fever    Tia is here today for a complaint of fever, chills, abdomen pain and headache.  She has been vomiting as well.  She has vomited about 20 times since midnight.  She is only passing foamy emesis now, which is blood tinged.  She has a history of cyclical vomiting syndrome.  She does not have fever or myalgias with this other than just projectile vomiting.  Season changes and stress causes the cyclical vomiting.  She has had exposure to stomach bugs and covid at " "her work at the [employer].  Her fever has been up to 101.2, which was 2 weeks ago.  She had what seemed like a stomach bug 2 weeks ago with vomiting and diarrhea.  She seemed to get over that and was well for about 5 days.  Started getting sick again 2 days ago.  Her symptoms seemed similar this time around.  She has had diarrhea as well, but better with taking imodium.  No fever in the past 2 days since her symptoms are back.  Has not been able to keep any fluids or food down today.  She had to leave work yesterday due to vomiting.  Ate a little yogurt and fruit yesterday and was able to keep that down.  She feels very weak and very dizzy and is having a hard time walking.  Last urine out put was an hour ago.  She has only been urinating about 3 times per day and is much lower in volume than normal.  She has a bad headache with the illness too.  Had taken a covid test at home 4 days ago, which was negative.  Has had covid twice, the last time around [place].  She had the 2 initial doses of covid vaccine, but no boosters.  zofran hasn't been helping with this illness.  Usually it helps with her underlying vomiting disorder.  Her abdominal pain is generalized, builds over a few minutes, then subsides.  She currently has her period.        4/4/2023    10:29 AM   Additional Questions   Roomed by CHERELLE Li   Accompanied by Mother     Headache   Associated symptoms include a fever.   Abdominal Pain   Associated symptoms include fever and headaches.   Fever  Associated symptoms include abdominal pain, a fever and headaches.   History of Present Illness       Headaches:   Since the patient's last clinic visit, headaches are: worsened  The patient is getting headaches:  Five times a week  She is not able to do normal daily activities when she has a migraine.  The patient is taking the following rescue/relief medications:  Ibuprofen (Advil, Motrin)   Patient states \"I get only a small amount of relief\" " "from the rescue/relief medications.   The patient is taking the following medications to prevent migraines:  Amitriptyline  In the past 4 weeks, the patient has gone to an Urgent Care or Emergency Room 0 times times due to headaches.    Reason for visit:  Puking~body aches~headache~chills~fever~  Symptom onset:  1-2 weeks ago    She eats 2-3 servings of fruits and vegetables daily.She consumes 2 sweetened beverage(s) daily.She exercises with enough effort to increase her heart rate 9 or less minutes per day.  She exercises with enough effort to increase her heart rate 3 or less days per week.   She is taking medications regularly.  Today's ZOEY-7 Score: 2               Review of Systems   Constitutional: Positive for fever.   Gastrointestinal: Positive for abdominal pain.   Neurological: Positive for headaches.      Constitutional, HEENT, cardiovascular, pulmonary, GI, , musculoskeletal, neuro, skin, endocrine and psych systems are negative, except as otherwise noted.      Objective    BP (!) 144/100   Pulse 110   Temp 97.8  F (36.6  C) (Tympanic)   Resp 18   Ht 1.619 m (5' 3.75\")   Wt 99.1 kg (218 lb 6.4 oz)   LMP 04/03/2023 (Exact Date)   SpO2 97%   Breastfeeding No   BMI 37.78 kg/m    Body mass index is 37.78 kg/m .  Physical Exam  Constitutional:       Appearance: Normal appearance.   HENT:      Head: Normocephalic.      Right Ear: Tympanic membrane normal.      Left Ear: Tympanic membrane normal.      Nose: Nose normal. No congestion.      Mouth/Throat:      Mouth: Mucous membranes are moist.      Pharynx: Oropharynx is clear. No oropharyngeal exudate or posterior oropharyngeal erythema.   Eyes:      Extraocular Movements: Extraocular movements intact.      Pupils: Pupils are equal, round, and reactive to light.   Cardiovascular:      Rate and Rhythm: Normal rate and regular rhythm.      Heart sounds: Normal heart sounds. No murmur heard.  Pulmonary:      Effort: Pulmonary effort is normal.      " Breath sounds: Normal breath sounds. No wheezing, rhonchi or rales.   Abdominal:      General: Abdomen is flat. Bowel sounds are normal. There is no distension.      Palpations: Abdomen is soft. There is no mass.      Tenderness: There is abdominal tenderness (generalized tenderness throughout.). There is no right CVA tenderness, left CVA tenderness, guarding or rebound.      Hernia: No hernia is present.   Musculoskeletal:      Cervical back: Normal range of motion and neck supple.   Lymphadenopathy:      Cervical: No cervical adenopathy.   Neurological:      Mental Status: She is alert.   Psychiatric:         Mood and Affect: Mood normal.         Behavior: Behavior normal.        Results for orders placed or performed during the hospital encounter of 04/04/23   XR Abdomen 2 Views     Status: None    Narrative    XR ABDOMEN 2 VIEWS   4/4/2023 11:42 AM    History:Female,age  32 years, Abdominal pain, generalized; Nausea and  vomiting, unspecified vomiting type    Comparison: No relevant prior imaging.    FINDINGS: Two views are submitted. The bowel gas pattern is  unremarkable. There is no evidence of free air or evidence of  obstruction.      Impression    IMPRESSION:  No acute pathology.    JESÚS GUZMÁN MD         SYSTEM ID:  [id number]   Results for orders placed or performed in visit on 04/04/23   UA reflex to Microscopic     Status: Abnormal   Result Value Ref Range    Color Urine Yellow Colorless, Straw, Light Yellow, Yellow    Appearance Urine Clear Clear    Glucose Urine Negative Negative mg/dL    Bilirubin Urine Negative Negative    Ketones Urine Negative Negative mg/dL    Specific Gravity Urine 1.019 1.000 - 1.030    Blood Urine Large (A) Negative    pH Urine 6.5 5.0 - 9.0    Protein Albumin Urine 30 (A) Negative mg/dL    Urobilinogen Urine Normal Normal, 2.0 mg/dL    Nitrite Urine Negative Negative    Leukocyte Esterase Urine Negative Negative    Bacteria Urine Few (A) None Seen /HPF    RBC Urine 27 (H)  <=2 /HPF    WBC Urine 1 <=5 /HPF    Squamous Epithelials Urine 3 (H) <=1 /HPF    Mucus Urine Present (A) None Seen /LPF    Hyaline Casts Urine 6 (H) <=2 /LPF   Lipase     Status: Normal   Result Value Ref Range    Lipase 38 13 - 60 U/L   Comprehensive metabolic panel     Status: Abnormal   Result Value Ref Range    Sodium 138 136 - 145 mmol/L    Potassium 4.0 3.4 - 5.3 mmol/L    Chloride 103 98 - 107 mmol/L    Carbon Dioxide (CO2) 24 22 - 29 mmol/L    Anion Gap 11 7 - 15 mmol/L    Urea Nitrogen 9.6 6.0 - 20.0 mg/dL    Creatinine 0.82 0.51 - 0.95 mg/dL    Calcium 9.2 8.6 - 10.0 mg/dL    Glucose 102 (H) 70 - 99 mg/dL    Alkaline Phosphatase 75 35 - 104 U/L    AST 21 10 - 35 U/L    ALT 25 10 - 35 U/L    Protein Total 8.3 6.4 - 8.3 g/dL    Albumin 4.4 3.5 - 5.2 g/dL    Bilirubin Total 0.4 <=1.2 mg/dL    GFR Estimate >90 >60 mL/min/1.73m2   Extra Tube     Status: None    Narrative    The following orders were created for panel order Extra Tube.  Procedure                               Abnormality         Status                     ---------                               -----------         ------                     Extra Serum Separator Tu...[[id number]]                      Final result                 Please view results for these tests on the individual orders.   CBC with platelets and differential     Status: Abnormal   Result Value Ref Range    WBC Count 10.4 4.0 - 11.0 10e3/uL    RBC Count 5.25 (H) 3.80 - 5.20 10e6/uL    Hemoglobin 11.6 (L) 11.7 - 15.7 g/dL    Hematocrit 38.1 35.0 - 47.0 %    MCV 73 (L) 78 - 100 fL    MCH 22.1 (L) 26.5 - 33.0 pg    MCHC 30.4 (L) 31.5 - 36.5 g/dL    RDW 20.2 (H) 10.0 - 15.0 %    Platelet Count 517 (H) 150 - 450 10e3/uL    % Neutrophils 64 %    % Lymphocytes 27 %    % Monocytes 7 %    % Eosinophils 1 %    % Basophils 1 %    % Immature Granulocytes 0 %    NRBCs per 100 WBC 0 <1 /100    Absolute Neutrophils 6.6 1.6 - 8.3 10e3/uL    Absolute Lymphocytes 2.8 0.8 - 5.3 10e3/uL    Absolute  Monocytes 0.7 0.0 - 1.3 10e3/uL    Absolute Eosinophils 0.1 0.0 - 0.7 10e3/uL    Absolute Basophils 0.1 0.0 - 0.2 10e3/uL    Absolute Immature Granulocytes 0.0 <=0.4 10e3/uL    Absolute NRBCs 0.0 10e3/uL   Extra Serum Separator Tube (SST)     Status: None   Result Value Ref Range    Hold Specimen [facility]    CBC and Differential     Status: Abnormal    Narrative    The following orders were created for panel order CBC and Differential.  Procedure                               Abnormality         Status                     ---------                               -----------         ------                     CBC with platelets and d...[[id number]]  Abnormal            Final result                 Please view results for these tests on the individual orders.        Assessment & Plan     ICD-10-CM    1. Abdominal pain, generalized  R10.84 prochlorperazine (COMPAZINE) injection 10 mg     Comprehensive metabolic panel     CBC and Differential     Lipase     UA reflex to Microscopic     Urine Culture Aerobic Bacterial     XR Abdomen 2 Views     Urine Culture Aerobic Bacterial     UA reflex to Microscopic     Lipase     CBC and Differential     Comprehensive metabolic panel      2. Nausea and vomiting, unspecified vomiting type  R11.2 Comprehensive metabolic panel     CBC and Differential     Lipase     UA reflex to Microscopic     Urine Culture Aerobic Bacterial     XR Abdomen 2 Views     Urine Culture Aerobic Bacterial     UA reflex to Microscopic     Lipase     CBC and Differential     Comprehensive metabolic panel        1.  Her pain symptoms seem most consistent with intestinal cramps with the colicky nature.  She has still had some diarrhea, but more vomiting recently.  Her labs and x-ray were reassuring.  She has her period right now, so would expect the blood seen in her urine.  The mild anemia is chronic and actually improved from previous.  I suspect that she had a viral gastroenteritis with her initial symptoms, but  with her history of cyclic vomiting she may be having a harder time getting over this illness and may even be causing a bit of an ileus with the ongoing vomiting.  No signs of acute abdomen right now.  She received a dose of Compazine IM while here today which really helped her nausea.  She feels confident that she is able to go home and will try to push fluids while the nausea is still well controlled.  Discussed would recommend taking scheduled antiemetics over the next day or two to keep ahead of nausea to try to be able to continue to keep hydrated.  Discussed that if her symptoms are escalating again, she should return to the Emergency Department for consideration of IV fluids, IV/IM antiemetics, further imaging with CT and pain control.  She agrees with this plan    2.  See above.      41 minutes spent in reviewing chart, interviewing and examining patient, evaluation of labs/xray, discussing plan, finishing note.  Rebeca Waldron MD  [facility]

## 2023-04-04 NOTE — NURSING NOTE
"Chief Complaint   Patient presents with     Emesis     Headache     Abdominal Pain     Fever     Patient is here for vomiting (pink color to it), headache, fever, and abdominal pain. Patient had same symptoms 2 weeks ago. She was not seen. Symptoms restarted this past weekend.     Initial BP (!) 144/100   Pulse 110   Temp 97.8  F (36.6  C) (Tympanic)   Resp 18   Ht 1.619 m (5' 3.75\")   Wt 99.1 kg (218 lb 6.4 oz)   LMP 04/03/2023 (Exact Date)   SpO2 97%   Breastfeeding No   BMI 37.78 kg/m   Estimated body mass index is 37.78 kg/m  as calculated from the following:    Height as of this encounter: 1.619 m (5' 3.75\").    Weight as of this encounter: 99.1 kg (218 lb 6.4 oz).  Medication Reconciliation: complete    Jen Russell CMA       FOOD SECURITY SCREENING QUESTIONS:    The next two questions are to help us understand your food security.  If you are feeling you need any assistance in this area, we have resources available to support you today.    Hunger Vital Signs:  Within the past 12 months we worried whether our food would run out before we got money to buy more. Never  Within the past 12 months the food we bought just didn't last and we didn't have money to get more. Never  Jen Russell CMA,LPN on 4/4/2023 at 10:36 AM      "

## 2023-04-05 LAB — BACTERIA UR CULT: NORMAL

## 2023-05-06 ENCOUNTER — HEALTH MAINTENANCE LETTER (OUTPATIENT)
Age: 33
End: 2023-05-06

## 2023-05-12 DIAGNOSIS — R11.2 INTRACTABLE NAUSEA AND VOMITING: ICD-10-CM

## 2023-05-16 DIAGNOSIS — R11.2 INTRACTABLE NAUSEA AND VOMITING: ICD-10-CM

## 2023-05-16 NOTE — TELEPHONE ENCOUNTER
Compazine not on current med list, noted as discontinued on 4/4/23.    Called and left message for patient to return call to verify request for Compazine.    Indy Ward RN on 5/16/2023 at 2:37 PM

## 2023-05-17 RX ORDER — PROCHLORPERAZINE MALEATE 10 MG
TABLET ORAL
Qty: 30 TABLET | Refills: 3 | Status: SHIPPED | OUTPATIENT
Start: 2023-05-17

## 2023-05-17 NOTE — TELEPHONE ENCOUNTER
[facility] sent Rx request for the followin/19/22 0825 Sign Darryn De Guzman MD Reorder from Order:[id number]      prochlorperazine (COMPAZINE) 10 MG tablet (Discontinued) 30 tablet 3 3/19/2022 2022 No   Sig - Route: Take 1 tablet (10 mg) by mouth every 6 hours as needed for nausea or vomiting - Oral     Last Office Visit:              23   Future Office visit:           None    Shannan Villanueva RN .............. 2023  12:35 PM

## 2023-05-18 RX ORDER — PROCHLORPERAZINE MALEATE 10 MG
TABLET ORAL
Qty: 30 TABLET | Refills: 3 | Status: SHIPPED | OUTPATIENT
Start: 2023-05-18 | End: 2023-11-28

## 2023-05-18 NOTE — TELEPHONE ENCOUNTER
Called and spoke with patient and patient states she continues to take Compazine as needed.    Dr. Peoples out of office will route to covering provider for review and consideration.  Indy Ward RN on 5/18/2023 at 9:42 AM

## 2023-05-25 ENCOUNTER — OFFICE VISIT (OUTPATIENT)
Dept: INTERNAL MEDICINE | Facility: OTHER | Age: 33
End: 2023-05-25
Payer: COMMERCIAL

## 2023-05-25 VITALS
TEMPERATURE: 98.9 F | HEART RATE: 98 BPM | BODY MASS INDEX: 38.32 KG/M2 | SYSTOLIC BLOOD PRESSURE: 130 MMHG | RESPIRATION RATE: 20 BRPM | WEIGHT: 221.5 LBS | DIASTOLIC BLOOD PRESSURE: 82 MMHG | OXYGEN SATURATION: 98 %

## 2023-05-25 DIAGNOSIS — L30.9 ECZEMA, UNSPECIFIED TYPE: ICD-10-CM

## 2023-05-25 DIAGNOSIS — H66.3X3 OTHER CHRONIC SUPPURATIVE OTITIS MEDIA OF BOTH EARS: Primary | ICD-10-CM

## 2023-05-25 DIAGNOSIS — H60.393 INFECTIVE OTITIS EXTERNA, BILATERAL: ICD-10-CM

## 2023-05-25 PROCEDURE — 99213 OFFICE O/P EST LOW 20 MIN: CPT

## 2023-05-25 RX ORDER — TRIAMCINOLONE ACETONIDE 1 MG/G
CREAM TOPICAL 3 TIMES DAILY
Qty: 80 G | Refills: 3 | Status: SHIPPED | OUTPATIENT
Start: 2023-05-25

## 2023-05-25 RX ORDER — CIPROFLOXACIN 500 MG/1
500 TABLET, FILM COATED ORAL 2 TIMES DAILY
Qty: 10 TABLET | Refills: 0 | Status: SHIPPED | OUTPATIENT
Start: 2023-05-25 | End: 2023-10-11

## 2023-05-25 RX ORDER — OFLOXACIN 3 MG/ML
5 SOLUTION AURICULAR (OTIC) 2 TIMES DAILY
Qty: 10 ML | Refills: 0 | Status: SHIPPED | OUTPATIENT
Start: 2023-05-25 | End: 2023-06-01

## 2023-05-25 ASSESSMENT — ENCOUNTER SYMPTOMS
EYE REDNESS: 1
SINUS PRESSURE: 0
EYE ITCHING: 1
SORE THROAT: 0
FEVER: 0
SINUS PAIN: 0
CHILLS: 0
HEADACHES: 1

## 2023-05-25 ASSESSMENT — PAIN SCALES - GENERAL: PAINLEVEL: NO PAIN (0)

## 2023-05-25 NOTE — PROGRESS NOTES
Assessment & Plan   Tia Sethi is a 32 year old presenting for the following health issues:      ICD-10-CM    1. Other chronic suppurative otitis media of both ears  H66.3X3 ciprofloxacin (CIPRO) 500 MG tablet     ofloxacin (FLOXIN) 0.3 % otic solution      2. Eczema, unspecified type  L30.9 triamcinolone (KENALOG) 0.1 % external cream      3. Infective otitis externa, bilateral  H60.393         Physical examination shows otitis externa and otitis media bilaterally.  Due to 2 failed attempts of antibiotic therapy to penicillin and a cephalosporin, will treat with a fluoroquinolone.  Prescribed ciprofloxacin twice daily for 10 days.  Additionally we will treat topically for otitis externa with ofloxacin 5 drops twice daily for 7 days.  Instructed her if symptoms continue to come back in to be reevaluated.  Provided her with drug information/side effects of ciprofloxacin.    No follow-ups on file.    [facility]      Ammon Weber is a 32 year old, presenting for the following health issues:  Patient presents to clinic for ongoing issues with both of her ears.  She was seen 7 months ago for otitis media of right ear and was started on Augmentin.  She states that she did not feel any relief from symptoms with this.  She then had an E visit for continued symptoms and was prescribed cefdinir.  She stated that her symptoms improved a little bit but have not resolved.  Her biggest complaint is her ears are very mattery, has to clean them out daily they continue to drain and also hurt.  Her ears are also itching.        Ear Problem (Ear infection for a couple months.has been on two oral antibiotics but it keeps coming back. Right ear draining and pillow full of drainage when sleeping.   Esperanza Amin LPN on 5/25/2023 at 4:17 PM////)    History of Present Illness       Reason for visit:  Ear infections    She eats 2-3 servings of fruits and vegetables  daily.She consumes 1 sweetened beverage(s) daily.She exercises with enough effort to increase her heart rate 30 to 60 minutes per day.  She exercises with enough effort to increase her heart rate 3 or less days per week.   She is taking medications regularly.               Review of Systems   Constitutional: Negative for chills and fever.   HENT: Positive for congestion, ear discharge and ear pain. Negative for sinus pressure, sinus pain and sore throat.    Eyes: Positive for redness and itching.   Allergic/Immunologic: Positive for environmental allergies.   Neurological: Positive for headaches.   All other systems reviewed and are negative.           Objective    /82 (BP Location: Right arm, Patient Position: Sitting, Cuff Size: Adult Regular)   Pulse 98   Temp 98.9  F (37.2  C)   Resp 20   Wt 100.5 kg (221 lb 8 oz)   LMP 04/03/2023 (Exact Date)   SpO2 98%   BMI 38.32 kg/m    Body mass index is 38.32 kg/m .  Physical Exam  Vitals reviewed.   Constitutional:       General: She is not in acute distress.     Appearance: She is well-developed.   HENT:      Head: Normocephalic and atraumatic.      Right Ear: Drainage and tenderness present. A middle ear effusion is present. Tympanic membrane is not erythematous.      Left Ear: Drainage and tenderness present. A middle ear effusion is present. Tympanic membrane is not erythematous.      Ears:      Comments: Purulent drainage behind both TMs     Nose: Nose normal.      Mouth/Throat:      Pharynx: No oropharyngeal exudate.   Eyes:      General: No scleral icterus.     Conjunctiva/sclera: Conjunctivae normal.      Pupils: Pupils are equal, round, and reactive to light.   Neck:      Thyroid: No thyromegaly.   Musculoskeletal:         General: No tenderness or deformity. Normal range of motion.      Cervical back: Normal range of motion and neck supple.   Skin:     General: Skin is warm and dry.      Findings: No rash.   Neurological:      Mental Status: She is  alert and oriented to person, place, and time.      Cranial Nerves: No cranial nerve deficit.      Coordination: Coordination normal.   Psychiatric:         Behavior: Behavior normal.         Thought Content: Thought content normal.         Judgment: Judgment normal.

## 2023-09-20 ENCOUNTER — HOSPITAL ENCOUNTER (EMERGENCY)
Facility: OTHER | Age: 33
Discharge: HOME | End: 2023-09-20
Attending: PHYSICIAN ASSISTANT | Admitting: PHYSICIAN ASSISTANT
Payer: COMMERCIAL

## 2023-09-20 VITALS
WEIGHT: 223 LBS | SYSTOLIC BLOOD PRESSURE: 139 MMHG | TEMPERATURE: 98.4 F | OXYGEN SATURATION: 98 % | BODY MASS INDEX: 39.51 KG/M2 | RESPIRATION RATE: 16 BRPM | HEIGHT: 63 IN | HEART RATE: 105 BPM | DIASTOLIC BLOOD PRESSURE: 99 MMHG

## 2023-09-20 DIAGNOSIS — R11.15 CYCLIC VOMITING SYNDROME: ICD-10-CM

## 2023-09-20 DIAGNOSIS — H60.91 RIGHT OTITIS EXTERNA: ICD-10-CM

## 2023-09-20 LAB
ALBUMIN SERPL BCG-MCNC: 4.4 G/DL (ref 3.5–5.2)
ALBUMIN UR-MCNC: NEGATIVE MG/DL
ALP SERPL-CCNC: 72 U/L (ref 35–104)
ALT SERPL W P-5'-P-CCNC: 28 U/L (ref 0–50)
AMPHETAMINES UR QL SCN: ABNORMAL
ANION GAP SERPL CALCULATED.3IONS-SCNC: 11 MMOL/L (ref 7–15)
APPEARANCE UR: ABNORMAL
AST SERPL W P-5'-P-CCNC: 29 U/L (ref 0–45)
BACTERIA #/AREA URNS HPF: ABNORMAL /HPF
BARBITURATES UR QL SCN: ABNORMAL
BASOPHILS # BLD AUTO: 0.1 10E3/UL (ref 0–0.2)
BASOPHILS NFR BLD AUTO: 1 %
BENZODIAZ UR QL SCN: ABNORMAL
BILIRUB SERPL-MCNC: 0.3 MG/DL
BILIRUB UR QL STRIP: NEGATIVE
BUN SERPL-MCNC: 11 MG/DL (ref 6–20)
BZE UR QL SCN: ABNORMAL
CALCIUM SERPL-MCNC: 9 MG/DL (ref 8.6–10)
CANNABINOIDS UR QL SCN: ABNORMAL
CHLORIDE SERPL-SCNC: 103 MMOL/L (ref 98–107)
COLOR UR AUTO: ABNORMAL
CREAT SERPL-MCNC: 0.76 MG/DL (ref 0.51–0.95)
DEPRECATED HCO3 PLAS-SCNC: 23 MMOL/L (ref 22–29)
EGFRCR SERPLBLD CKD-EPI 2021: >90 ML/MIN/1.73M2
EOSINOPHIL # BLD AUTO: 0.1 10E3/UL (ref 0–0.7)
EOSINOPHIL NFR BLD AUTO: 1 %
ERYTHROCYTE [DISTWIDTH] IN BLOOD BY AUTOMATED COUNT: 17.3 % (ref 10–15)
FENTANYL UR QL: ABNORMAL
GLUCOSE SERPL-MCNC: 95 MG/DL (ref 70–99)
GLUCOSE UR STRIP-MCNC: NEGATIVE MG/DL
HCG UR QL: NEGATIVE
HCT VFR BLD AUTO: 33.3 % (ref 35–47)
HGB BLD-MCNC: 10.3 G/DL (ref 11.7–15.7)
HGB UR QL STRIP: NEGATIVE
IMM GRANULOCYTES # BLD: 0 10E3/UL
IMM GRANULOCYTES NFR BLD: 0 %
KETONES UR STRIP-MCNC: NEGATIVE MG/DL
LACTATE SERPL-SCNC: 0.9 MMOL/L (ref 0.7–2)
LEUKOCYTE ESTERASE UR QL STRIP: NEGATIVE
LIPASE SERPL-CCNC: 50 U/L (ref 13–60)
LYMPHOCYTES # BLD AUTO: 2.7 10E3/UL (ref 0.8–5.3)
LYMPHOCYTES NFR BLD AUTO: 29 %
MAGNESIUM SERPL-MCNC: 1.9 MG/DL (ref 1.7–2.3)
MCH RBC QN AUTO: 23.4 PG (ref 26.5–33)
MCHC RBC AUTO-ENTMCNC: 30.9 G/DL (ref 31.5–36.5)
MCV RBC AUTO: 76 FL (ref 78–100)
MONOCYTES # BLD AUTO: 0.6 10E3/UL (ref 0–1.3)
MONOCYTES NFR BLD AUTO: 7 %
NEUTROPHILS # BLD AUTO: 5.9 10E3/UL (ref 1.6–8.3)
NEUTROPHILS NFR BLD AUTO: 62 %
NITRATE UR QL: NEGATIVE
NRBC # BLD AUTO: 0 10E3/UL
NRBC BLD AUTO-RTO: 0 /100
OPIATES UR QL SCN: ABNORMAL
PCP QUAL URINE (ROCHE): ABNORMAL
PH UR STRIP: 7 [PH] (ref 5–9)
PLATELET # BLD AUTO: 414 10E3/UL (ref 150–450)
POTASSIUM SERPL-SCNC: 4.2 MMOL/L (ref 3.4–5.3)
PROT SERPL-MCNC: 7.7 G/DL (ref 6.4–8.3)
RBC # BLD AUTO: 4.41 10E6/UL (ref 3.8–5.2)
RBC URINE: 1 /HPF
SODIUM SERPL-SCNC: 137 MMOL/L (ref 136–145)
SP GR UR STRIP: 1.01 (ref 1–1.03)
SQUAMOUS EPITHELIAL: 15 /HPF
TSH SERPL DL<=0.005 MIU/L-ACNC: 0.9 UIU/ML (ref 0.3–4.2)
UROBILINOGEN UR STRIP-MCNC: NORMAL MG/DL
WBC # BLD AUTO: 9.3 10E3/UL (ref 4–11)
WBC URINE: 1 /HPF

## 2023-09-20 PROCEDURE — 96375 TX/PRO/DX INJ NEW DRUG ADDON: CPT | Performed by: PHYSICIAN ASSISTANT

## 2023-09-20 PROCEDURE — 96361 HYDRATE IV INFUSION ADD-ON: CPT | Performed by: PHYSICIAN ASSISTANT

## 2023-09-20 PROCEDURE — 96374 THER/PROPH/DIAG INJ IV PUSH: CPT | Performed by: PHYSICIAN ASSISTANT

## 2023-09-20 PROCEDURE — 250N000011 HC RX IP 250 OP 636: Performed by: PHYSICIAN ASSISTANT

## 2023-09-20 PROCEDURE — 258N000003 HC RX IP 258 OP 636: Performed by: PHYSICIAN ASSISTANT

## 2023-09-20 PROCEDURE — 80307 DRUG TEST PRSMV CHEM ANLYZR: CPT | Performed by: PHYSICIAN ASSISTANT

## 2023-09-20 PROCEDURE — 99284 EMERGENCY DEPT VISIT MOD MDM: CPT | Performed by: PHYSICIAN ASSISTANT

## 2023-09-20 PROCEDURE — 36415 COLL VENOUS BLD VENIPUNCTURE: CPT | Performed by: PHYSICIAN ASSISTANT

## 2023-09-20 PROCEDURE — 87205 SMEAR GRAM STAIN: CPT | Performed by: PHYSICIAN ASSISTANT

## 2023-09-20 PROCEDURE — 85025 COMPLETE CBC W/AUTO DIFF WBC: CPT | Performed by: PHYSICIAN ASSISTANT

## 2023-09-20 PROCEDURE — 80053 COMPREHEN METABOLIC PANEL: CPT | Performed by: PHYSICIAN ASSISTANT

## 2023-09-20 PROCEDURE — 87102 FUNGUS ISOLATION CULTURE: CPT | Performed by: PHYSICIAN ASSISTANT

## 2023-09-20 PROCEDURE — 81001 URINALYSIS AUTO W/SCOPE: CPT | Performed by: PHYSICIAN ASSISTANT

## 2023-09-20 PROCEDURE — 83690 ASSAY OF LIPASE: CPT | Performed by: PHYSICIAN ASSISTANT

## 2023-09-20 PROCEDURE — 83735 ASSAY OF MAGNESIUM: CPT | Performed by: PHYSICIAN ASSISTANT

## 2023-09-20 PROCEDURE — 81025 URINE PREGNANCY TEST: CPT | Performed by: PHYSICIAN ASSISTANT

## 2023-09-20 PROCEDURE — 83605 ASSAY OF LACTIC ACID: CPT | Performed by: PHYSICIAN ASSISTANT

## 2023-09-20 PROCEDURE — 87077 CULTURE AEROBIC IDENTIFY: CPT | Performed by: PHYSICIAN ASSISTANT

## 2023-09-20 PROCEDURE — 84443 ASSAY THYROID STIM HORMONE: CPT | Performed by: PHYSICIAN ASSISTANT

## 2023-09-20 PROCEDURE — 99284 EMERGENCY DEPT VISIT MOD MDM: CPT | Mod: 25 | Performed by: PHYSICIAN ASSISTANT

## 2023-09-20 RX ORDER — DIPHENHYDRAMINE HYDROCHLORIDE 50 MG/ML
25 INJECTION INTRAMUSCULAR; INTRAVENOUS ONCE
Status: COMPLETED | OUTPATIENT
Start: 2023-09-20 | End: 2023-09-20

## 2023-09-20 RX ORDER — KETOROLAC TROMETHAMINE 15 MG/ML
15 INJECTION, SOLUTION INTRAMUSCULAR; INTRAVENOUS ONCE
Status: COMPLETED | OUTPATIENT
Start: 2023-09-20 | End: 2023-09-20

## 2023-09-20 RX ORDER — HYDROXYZINE PAMOATE 25 MG/1
25 CAPSULE ORAL 4 TIMES DAILY PRN
Qty: 60 CAPSULE | Refills: 1 | Status: SHIPPED | OUTPATIENT
Start: 2023-09-20 | End: 2023-11-28

## 2023-09-20 RX ORDER — METOCLOPRAMIDE HYDROCHLORIDE 5 MG/ML
10 INJECTION INTRAMUSCULAR; INTRAVENOUS ONCE
Status: COMPLETED | OUTPATIENT
Start: 2023-09-20 | End: 2023-09-20

## 2023-09-20 RX ORDER — DEXAMETHASONE SODIUM PHOSPHATE 10 MG/ML
10 INJECTION, SOLUTION INTRAMUSCULAR; INTRAVENOUS ONCE
Status: COMPLETED | OUTPATIENT
Start: 2023-09-20 | End: 2023-09-20

## 2023-09-20 RX ADMIN — KETOROLAC TROMETHAMINE 15 MG: 15 INJECTION INTRAMUSCULAR; INTRAVENOUS at 11:56

## 2023-09-20 RX ADMIN — SODIUM CHLORIDE 1000 ML: 9 INJECTION, SOLUTION INTRAVENOUS at 11:56

## 2023-09-20 RX ADMIN — DEXAMETHASONE SODIUM PHOSPHATE 10 MG: 10 INJECTION, SOLUTION INTRAMUSCULAR; INTRAVENOUS at 11:56

## 2023-09-20 RX ADMIN — METOCLOPRAMIDE 10 MG: 5 INJECTION, SOLUTION INTRAMUSCULAR; INTRAVENOUS at 11:57

## 2023-09-20 RX ADMIN — DIPHENHYDRAMINE HYDROCHLORIDE 25 MG: 50 INJECTION, SOLUTION INTRAMUSCULAR; INTRAVENOUS at 11:56

## 2023-09-20 NOTE — ED TRIAGE NOTES
Patient comes over from [facility] with cyclical vomiting that started on Monday.  Triggered by stress, change of weather, etc.  She also has complaints of a migraine and right ear pain.  Unable to keep food down and drinking is difficult.  Last took compazine at 0800.  Concerned also about her elevated BP.     Triage Assessment       Row Name 09/20/23 0937       Triage Assessment (Adult)    Airway WDL WDL       Respiratory WDL    Respiratory WDL WDL       Skin Circulation/Temperature WDL    Skin Circulation/Temperature WDL WDL       Cardiac WDL    Cardiac WDL WDL       Peripheral/Neurovascular WDL    Peripheral Neurovascular WDL WDL       Cognitive/Neuro/Behavioral WDL    Cognitive/Neuro/Behavioral WDL WDL

## 2023-09-20 NOTE — ED PROVIDER NOTES
"      EMERGENCY DEPARTMENT ENCOUNTER      NAME: Tia Sethi  AGE: 32 year old female  YOB: 1990  MRN: [id number]  EVALUATION DATE & TIME: 9/20/2023 11:27 AM    PCP: Nicho Peoples    ED PROVIDER: Malcolm Benitez PA-C       CHIEF COMPLAINT:  Chief Complaint   Patient presents with    Vomiting    Otalgia       ED COURSE, MEDICAL DECISION MAKING, FINAL IMPRESSION AND PLAN:     The patient was interviewed and examined.  HPI and physical exam as below.  Differential diagnosis and MDM Key Documentation Elements as below.  Vitals and triage note were reviewed.  BP (!) 139/99   Pulse 105   Temp 98.4  F (36.9  C) (Tympanic)   Resp 16   Ht 1.6 m (5' 3\")   Wt 101.2 kg (223 lb)   LMP 09/08/2023   SpO2 98%   BMI 39.50 kg/m      Tia Sethi is a pleasant 32 year old female with history of sickle vomiting syndrome who presents to the ER today for 2 chief complaints.  First chief complaint is she is having upper episodes of similar vomiting syndrome.  Episode started on Monday.  Having multiple episodes of nausea and vomiting.  No abdominal pain.  No diarrhea or constipation.  No urinary symptoms.  Patient states that she is being followed by gastroenterology at .      Patient also complaining of right ear pain.  Pain is ongoing for the past few days.  Moderate pain with some yellowish-greenish ear discharge.  Some mild headache.  No posterior ear pain.  Mother who is also at bedside states that she has had multiple ear infections as a child.  Patient states that she is also had multiple ear infections this past year.    Differential includes but is not limited to cyclic vomiting syndrome, gastroenteritis, enteritis, small bowel obstruction, cholecystitis, pancreatitis, UTI, nephrolithiasis, otitis media, otitis externa, mastoiditis    Patient today was afebrile with elevated blood pressure and mild tachycardia.  Patient is in no acute distress.  HEENT today revealed " yellowish-greenish discharge in the right ear obscuring the right TM.  Erythema of the right EAC concerning for otitis media.  No signs otitis externa.  No abdominal tenderness.  No flank or CVA tenderness.  Exam otherwise benign.    UDS positive for cannabinoids.  Patient was not pregnant.  No signs of hematuria or UTI.  No leukocytosis.  Patient with microcytic anemia with hemoglobin 10 point C and MCV 76.  Normal renal and hepatic function.  Normal lactic acid.  Normal lipase.  Normal electrolytes.  Normal TSH.    Patient was given IV Decadron 10 mg, IV Toradol 50 mg, IV Reglan 10 mg, and IV Benadryl 25 mg with IV fluids with significant improvement in symptoms.    Assessment / Plan:  1. Cyclic vomiting syndrome    2. Right otitis externa    -Continue her at home Compazine as prescribed.  We will add in Vistaril 25 mg every 6 hours for additional nausea relief.  Recommend close follow-up with your GI doctor.  Recommend cessation from CBD if possible.  Return to the ER for any worsening of symptoms.  For patient's ear infection as she may see ciprofloxacin otic drops.  Ear sherman were provided to the patient here in the ER.  Follow-up with ENT.  Return to the ER for any worsening of symptoms.        Pertinent Labs & Imaging studies reviewed. (See chart for details)     No results found for: ABORH    Medications given during today's ER visit:  Medications   sodium chloride 0.9% BOLUS 1,000 mL (has no administration in time range)   dexAMETHasone PF (DECADRON) injection 10 mg (has no administration in time range)   ketorolac (TORADOL) injection 15 mg (has no administration in time range)   metoclopramide (REGLAN) injection 10 mg (has no administration in time range)   diphenhydrAMINE (BENADRYL) injection 25 mg (has no administration in time range)       New prescriptions started at today's ER visit  New Prescriptions    No medications on file     Reassessments, Medications, Interventions, & Response to  Treatments:  Symptoms improved with use of interventions    Consultations:  None    Decision Rules, Medical Calculators, and Risk Stratification Tools:  None    MDM Key Documentation Elements for Patient's Evaluation:  Differential diagnosis to include high risk considerations: As above  Escalation to admission/observation considered: Admission/observation considered, but patient does not meet admission criteria  Discussions and management with other clinicians:    3a. Independent interpretation of testing performed by another health professional:  -No  3b. Discussion of management or test interpretation with another health professional: -No  Independent interpretation of tests:  Ordering and/or review of 3+ test(s)  Discussion of test interpretations with radiology:  No  History obtained from source other than patient or assessment requiring an independent historian:  No  Review of non-ED/external records:  review of 3+ records  Diagnostic tests considered but not ultimately performed/deferred:  -CT abdomen pelvis  Prescription medications considered but not prescribed:  -Reglan  Chronic conditions affecting care:  -Mike  Care affected by social determinants of health:  -None    The patient's management involved:   - Laboratory studies  - Imaging studies  - Parenteral controlled substance  - Prescription drug management      A shared decision making model was used. Time was taken to answer all questions.  Patient and/or associated parties understood and were agreeable to treatment plan.  Plan and all results were discussed. Warning signs and close return precautions to return to the ED given. Copy of results given. Discharged in stable condition. Discharged with discharge instructions outlining plan for further care and follow up.      PPE worn during patient evaluation:  Mask: No  Eye Protection: No  Gown: No  Hair cover: No  Face Shield: No  Patient wearing a mask:  No    =================================================================    HPI  Tia Sethi is a pleasant 32 year old female with history of sickle vomiting syndrome who presents to the ER today for 2 chief complaints.  First chief complaint is she is having upper episodes of similar vomiting syndrome.  Episode started on Monday.  Having multiple episodes of nausea and vomiting.  No abdominal pain.  No diarrhea or constipation.  No urinary symptoms.  Patient states that she is being followed by gastroenterology at [facility].      Patient also complaining of right ear pain.  Pain is ongoing for the past few days.  Moderate pain with some yellowish-greenish ear discharge.  Some mild headache.  No posterior ear pain.  Mother who is also at bedside states that she has had multiple ear infections as a child.  Patient states that she is also had multiple ear infections this past year.      REVIEW OF SYSTEMS   Review of Systems  As above, otherwise ROS is unremarkable.      PAST MEDICAL HISTORY:  Past Medical History:   Diagnosis Date    Helicobacter pylori infection     No Comments Provided       PAST SURGICAL HISTORY:  Past Surgical History:   Procedure Laterality Date    ESOPHAGOSCOPY, GASTROSCOPY, DUODENOSCOPY (EGD), COMBINED N/A 3/18/2022    Procedure: ESOPHAGOGASTRODUODENOSCOPY, WITH BIOPSY;  Surgeon: Osmar Velez MD;  Location: [facility] OR    exision mass retrorectal and coccyx  06/09/2017    Ori Espinoza MD at [facility]       CURRENT MEDICATIONS:    Current Outpatient Medications   Medication Instructions    amitriptyline (ELAVIL) 25 MG tablet 1 tablet, Oral, AT BEDTIME    choriogonadotropin davon (OVIDREL) 250 mcg, Subcutaneous, ONCE    ciprofloxacin (CIPRO) 500 mg, Oral, 2 TIMES DAILY    clonazePAM (KLONOPIN) 1 MG tablet TAKE 1/2 TO 1 TABLET BY MOUTH TWICE DAILY AS NEEDED FOR ANXIETY    Loperamide HCl (IMODIUM A-D PO) Oral    methylphenidate (RITALIN LA) 40 mg, Oral, DAILY    methylphenidate  (RITALIN LA) 40 mg, Oral, DAILY    methylphenidate (RITALIN LA) 40 mg, Oral, EVERY MORNING    naproxen (NAPROSYN) 500 mg, Oral, 2 TIMES DAILY PRN    omeprazole (PRILOSEC OTC) 20 mg, Oral, DAILY    ondansetron (ZOFRAN ODT) 4 mg, Oral, EVERY 8 HOURS PRN    Prenatal MV & Min w/FA-DHA ([employer] PRENATAL GUMMY PO) 1 chew tab, Oral, DAILY    prochlorperazine (COMPAZINE) 10 MG tablet TAKE 1 TABLET(10 MG) BY MOUTH EVERY 6 HOURS AS NEEDED FOR NAUSEA OR VOMITING    prochlorperazine (COMPAZINE) 10 MG tablet TAKE 1 TABLET(10 MG) BY MOUTH EVERY 6 HOURS AS NEEDED FOR NAUSEA OR VOMITING    triamcinolone (KENALOG) 0.1 % external cream Topical, 3 TIMES DAILY, Apply  topically to affected area(s) 3 times daily.       ALLERGIES:  Allergies   Allergen Reactions    Other  [No Clinical Screening - See Comments]      Other reaction(s): Throat Irritation  Carmex, Pine, Icy Hot    Menthol      Other reaction(s): Swelling    Soap & Cleansers      Other reaction(s): Swelling    Pine Tar      Other reaction(s): Runny Nose       FAMILY HISTORY:  Family History   Problem Relation Age of Onset    Cancer Father         Cancer, h/o testicular CA    Heart Disease Maternal Grandmother         Heart Disease,cardiomyopathy    Blood Disease Maternal Grandmother         Blood Disease,blood dyscrasia       SOCIAL HISTORY:   Social History     Socioeconomic History    Marital status:      Spouse name: None    Number of children: None    Years of education: None    Highest education level: None   Tobacco Use    Smoking status: Never    Smokeless tobacco: Never   Vaping Use    Vaping Use: Never used   Substance and Sexual Activity    Alcohol use: Not Currently     Alcohol/week: 4.2 standard drinks of alcohol     Comment: occassional    Drug use: No    Sexual activity: Yes     Partners: Male   Social History Narrative    Graduated from college, 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  Moved back to [place] in February 2018.  Engaged to be  " July 2018.    Dannie Father    Criselda  Mother    Sibs     Joanne 1989    Juan Miguel 1991    Yury 1996.       PHYSICAL EXAM    VITAL SIGNS: BP (!) 139/99   Pulse 105   Temp 98.4  F (36.9  C) (Tympanic)   Resp 16   Ht 1.6 m (5' 3\")   Wt 101.2 kg (223 lb)   LMP 09/08/2023   SpO2 98%   BMI 39.50 kg/m      Patient Vitals for the past 24 hrs:   BP Temp Temp src Pulse Resp SpO2 Height Weight   09/20/23 0939 (!) 139/99 98.4  F (36.9  C) Tympanic 105 16 98 % 1.6 m (5' 3\") 101.2 kg (223 lb)       Physical Exam  Vitals and nursing note reviewed.   Constitutional:       Appearance: Normal appearance. She is not ill-appearing or diaphoretic.   HENT:      Head: Normocephalic and atraumatic.      Left Ear: Tympanic membrane, ear canal and external ear normal.      Ears:      Comments: Unable to fully visualize right TM due to the yellowish-greenish discharge throughout the ear canal concerning for otitis externa.  Aerobic and fungal culture swabs were obtained.  EAC was erythematous and tender to the touch.  No tragal tenderness.  No mastoid tenderness bilaterally.     Nose: Nose normal.      Mouth/Throat:      Mouth: Mucous membranes are moist.      Pharynx: Oropharynx is clear.   Eyes:      Conjunctiva/sclera: Conjunctivae normal.   Cardiovascular:      Rate and Rhythm: Normal rate and regular rhythm.      Pulses: Normal pulses.      Heart sounds: Normal heart sounds.   Pulmonary:      Effort: Pulmonary effort is normal.      Breath sounds: Normal breath sounds.   Abdominal:      General: Abdomen is flat. Bowel sounds are normal.      Palpations: Abdomen is soft.      Tenderness: There is no abdominal tenderness. There is no right CVA tenderness, left CVA tenderness, guarding or rebound.   Musculoskeletal:         General: Normal range of motion.      Cervical back: Normal range of motion.   Skin:     General: Skin is warm and dry.      Capillary Refill: Capillary refill takes less than 2 seconds.   Neurological: "      General: No focal deficit present.      Mental Status: She is alert.   Psychiatric:         Mood and Affect: Mood normal.              LABS & RADIOLOGY:  All pertinent labs reviewed and interpreted. Reviewed all pertinent imaging. Please see official radiology report.     No orders to display           IFederico PA-C, personally performed the services described in this documentation, and it is both accurate and complete.       Malcolm Benitez PA-C  09/20/23 1257

## 2023-09-20 NOTE — DISCHARGE INSTRUCTIONS
-Continue with your Compazine  -Add Vistaril 25 mg every 6 hours for additional nausea relief  -Use ciprofloxacin eardrops 3 drops into affected ear every 12 hours for 7 to 10 days  -Follow-up with gastroenterology for your cervical vomiting syndrome and follow-up with ENT for your right ear.

## 2023-09-20 NOTE — Clinical Note
Tia Sethi was seen and treated in our emergency department on 9/20/2023.  She may return to work on 09/23/2023.       If you have any questions or concerns, please don't hesitate to call.      Malcolm Benitez PA-C

## 2023-09-23 LAB
BACTERIA SPEC CULT: ABNORMAL
GRAM STAIN RESULT: ABNORMAL

## 2023-09-23 NOTE — RESULT ENCOUNTER NOTE
Final Swab Aerobic Bacterial Culture (specimen - right ear) report on 9/23  [facility] Emergency Dept discharge antibiotic prescribed: ciprofloxacin-hydrocortisone (CIPRO HC OTIC) 0.2-1 % otic suspension - Place 3 drops into the right ear 2 times daily for 7 to 10 days  Bacteria culture grew out:  Pseudomonas aeruginosa,  Escherichia coli ,  Streptococcus dysgalactiae (Group C/G Streptococcus)  Recommendations in treatment per [facility] ED lab result culture protocol

## 2023-09-25 ENCOUNTER — TELEPHONE (OUTPATIENT)
Dept: EMERGENCY MEDICINE | Facility: OTHER | Age: 33
End: 2023-09-25
Payer: COMMERCIAL

## 2023-09-25 NOTE — TELEPHONE ENCOUNTER
[facility]) Emergency Department/Urgent Care Lab result notification  [Note:  ED Lab Results RN will reference the [facility] Emergency Dept visit note prior to contacting patient AND/OR prior to consulting Emergency Dept Provider.  Highlights of Emergency Dept visit in information summary at the bottom of this telephone note]    1. Reason for call  Notify of lab results  Assess patient symptoms [if necessary]  Review ED Providers recommendations/discharge instructions (if necessary)  Advise per [facility] ED lab result protocol    2. Lab Result (including Rx patient on, if applicable).  If culture, copy of lab report at bottom.  Final Swab Aerobic Bacterial Culture (specimen - right ear) report on 9/23  [facility] Emergency Dept discharge antibiotic prescribed: ciprofloxacin-hydrocortisone (CIPRO HC OTIC) 0.2-1 % otic suspension - Place 3 drops into the right ear 2 times daily for 7 to 10 days  Bacteria culture grew out:  Pseudomonas aeruginosa,  Escherichia coli ,  Streptococcus dysgalactiae (Group C/G Streptococcus)  Recommendations in treatment per [facility] ED lab result culture protocol    3. RN Assessment (Patient's current Symptoms):  Time of call: 9/25/2023 4:24 PM  Assessment: Overall patient reporting medication has been helping, she has ENT appointment 10/11/12  Right Ear:  less skin flaking, less drainage, less pain, less redness/swelling   Fever:  None    4. RN Recommendations/Instructions per [place] ED lab result protocol  [facility] ED lab result protocol used: culture protocol  Patient was notified of lab result and treatment recommendations  RN reviewed information about continue and complete antibiotic ear drops, follow up with ENT, return for any worsening fevers, redness, swelling, pain, warmth, drainage.  Patient agrees with plan.      5. Please Contact your PCP clinic or return to the Emergency department if your:  Symptoms  return.  Symptoms do not improve after 3 days on antibiotic.  Symptoms do not resolve after completing antibiotic.  Symptoms worsen or other concerning symptoms.      Copy of Lab report (if applicable)  Swab Aerobic Bacterial Culture Routine with Gram Stain  Order: [id number]  Status: Final result       Visible to patient: Yes (not seen)    Specimen Information: Ear, Right; Swab   3 Result Notes  Culture 4+ Pseudomonas aeruginosa Abnormal       2+ Escherichia coli Abnormal       4+ Streptococcus dysgalactiae (Group C/G Streptococcus) Abnormal                Gram Stain Result  Abnormal   No PMNs seen      4+ Gram positive cocci      4+ Gram negative bacilli      1+ Gram positive bacilli              Resulting Agency: [facility]     Susceptibility    Escherichia coli (2)    Antibiotic Interpretation Sensitivity Method Status    Amoxicillin/Clavulanate Resistant >16 AZALIA Final    Ampicillin Resistant >16 AZALIA Final    Ampicillin/ Sulbactam Resistant >16 AZALIA Final    Aztreonam Susceptible <=4 AZALIA Final    Cefepime Susceptible <=2 AZALIA Final    Ceftazidime Susceptible <=1 AZALIA Final    Ceftriaxone Resistant 8 AZALIA Final    Ciprofloxacin   AZALIA Final     Ciprofloxacin not resistant.  Due to test limitations, lab cannot provide AZALIA to determine susceptibility.       Ertapenem Resistant >1 AZALIA Final    Gentamicin Susceptible <=4 AZALIA Final    Imipenem Susceptible <=1 AZALIA Final    Levofloxacin   AZALIA Final     Levofloxacin not resistant.  Due to test limitations, lab cannot provide AZALIA to determine susceptibility.       Piperacillin/Tazobactam   AZALIA Final     Piperacillin/Tazobactam not resistant.  Due to test limitations, lab cannot provide AZALIA to determine susceptibility.       Tetracycline Intermediate 8 AZALIA Final    Tobramycin Susceptible <=4 AZALIA Final    Trimethoprim/Sulfamethoxazole Susceptible <=2/38 AZALIA Final    Cefpodoxime Resistant >4 AZALIA Final    Cefuroxime Resistant >16 AZALIA Final    Pseudomonas aeruginosa (1)    Antibiotic  Interpretation Sensitivity Method Status    Cefepime Susceptible 4 AZALIA Final    Ciprofloxacin   AZALIA Final     Ciprofloxacin not resistant.  Due to test limitations, lab cannot provide AZALIA to determine susceptibility.       Gentamicin Susceptible <=4 AZALIA Final    Imipenem Susceptible <=1 AZALIA Final    Levofloxacin   AZALIA Final     Levofloxacin not resistant.  Due to test limitations, lab cannot provide AZALIA to determine susceptibility.       Tobramycin Susceptible <=4 AZALIA Final    Streptococcus dysgalactiae (Group C/G Streptococcus) (3)    Antibiotic Interpretation Sensitivity Method Status    Ampicillin Susceptible <=0.06 AZALIA Final    Ceftriaxone (meningitis) Susceptible <=0.25 AZALIA Final    Ceftriaxone (non-meningitis) Susceptible <=0.25 AZALIA Final    Erythromycin Susceptible <=0.06 AZALIA Final    Levofloxacin Susceptible 0.5 AZALIA Final    Tetracycline Resistant >4 AZALIA Final    Vancomycin Susceptible 0.5 AZALIA Final    Azithromycin Susceptible <=0.25 AZALIA Final    Cefepime Susceptible <=0.25 AZALIA Final    Penicillin Susceptible <=0.03 AZALIA Final    Clindamycin Susceptible <=0.06 AZALIA Final     Condensed View         Specimen Collected: 09/20/23 11:42 AM Last Resulted: 09/23/23  7:27 AM               Loreta Coronel RN  [facility]  Emergency Dept Lab Result RN  Ph# [phone]

## 2023-10-10 NOTE — PATIENT INSTRUCTIONS
Start Vosol ear drops.  Use 2-3 times daily for 10 days to both ears.   Try to keep ears dry  Complete audiogram.   Follow up in 6-8 weeks for recheck  If any new or worsening symptoms- return to ENT sooner.     May use Vinegar rinses if needed for drainage.     Thank you for allowing TAMEKA Meredith and our ENT team to participate in your care.  If your medications are too expensive, please give the nurse a call.  We can possibly change this medication.  If you have a scheduling or an appointment question please contact our Health Unit Coordinator at their direct line [phone].   ALL nursing questions or concerns can be directed to your ENT nurse, Ceci at: [phone].

## 2023-10-11 ENCOUNTER — OFFICE VISIT (OUTPATIENT)
Dept: FAMILY MEDICINE | Facility: OTHER | Age: 33
End: 2023-10-11
Attending: PHYSICIAN ASSISTANT
Payer: COMMERCIAL

## 2023-10-11 VITALS
DIASTOLIC BLOOD PRESSURE: 97 MMHG | HEIGHT: 63 IN | HEART RATE: 103 BPM | BODY MASS INDEX: 39.58 KG/M2 | OXYGEN SATURATION: 98 % | RESPIRATION RATE: 16 BRPM | TEMPERATURE: 99 F | SYSTOLIC BLOOD PRESSURE: 130 MMHG | WEIGHT: 223.4 LBS

## 2023-10-11 DIAGNOSIS — H60.93 RECURRENT OTITIS EXTERNA OF BOTH EARS: Primary | ICD-10-CM

## 2023-10-11 PROCEDURE — 92504 EAR MICROSCOPY EXAMINATION: CPT | Performed by: PHYSICIAN ASSISTANT

## 2023-10-11 PROCEDURE — 99213 OFFICE O/P EST LOW 20 MIN: CPT | Mod: 25 | Performed by: PHYSICIAN ASSISTANT

## 2023-10-11 RX ORDER — RAMELTEON 8 MG/1
8 TABLET ORAL AT BEDTIME
COMMUNITY
Start: 2023-09-29 | End: 2024-02-05 | Stop reason: ALTCHOICE

## 2023-10-11 RX ORDER — HYDROCORTISONE AND ACETIC ACID 20.75; 10.375 MG/ML; MG/ML
4 SOLUTION AURICULAR (OTIC) 3 TIMES DAILY
Qty: 10 ML | Refills: 0 | Status: SHIPPED | OUTPATIENT
Start: 2023-10-11 | End: 2023-10-21

## 2023-10-11 ASSESSMENT — PAIN SCALES - GENERAL: PAINLEVEL: MILD PAIN (2)

## 2023-10-11 NOTE — PROGRESS NOTES
Otolaryngology Consultation    Patient: Tia Sethi  : 1990    Patient presents with:  Ear Problem: Otitis externa, referred by TAMEKA Nixon      HPI:  Tia Sethi is a 32 year old female seen today for Right otitis externa. Patient was seen on 23 for right otalgia in ED, she noted to have yellow- green drainage from her ear. She was started on Cipro drops.   Today. She has developed recurrent ear infections since 2023. Reports she had inner and outer ear infections which requiring abx treatment.   Reports there is drainage/ crusting in each ears. Reports symptoms improve following abx treatment but recur.   She has felt her hearing has slightly decreased since the onset.   She uses cotton swabs to clear her ears. Both ears are frequently itchy.       +Hx of COM.   Denies otologic surgeries.   Denies worrisome tinnitus.  Denies fluctuating hearing loss or tinnitus.   Denies vertigo or facial paraesthesia.   Hx of Noise Exposure- No.    No recent audiogram.     Current Outpatient Rx   Medication Sig Dispense Refill    amitriptyline (ELAVIL) 25 MG tablet Take 1 tablet by mouth At Bedtime      choriogonadotropin davon (OVIDREL) 250 MCG/0.5ML injection Inject 0.5 mLs (250 mcg) Subcutaneous once for 1 dose 0.5 mL 0    ciprofloxacin (CIPRO) 500 MG tablet Take 1 tablet (500 mg) by mouth 2 times daily 10 tablet 0    ciprofloxacin-hydrocortisone (CIPRO HC OTIC) 0.2-1 % otic suspension Place 3 drops into the right ear 2 times daily 10 mL 0    clonazePAM (KLONOPIN) 1 MG tablet TAKE 1/2 TO 1 TABLET BY MOUTH TWICE DAILY AS NEEDED FOR ANXIETY 60 tablet 5    hydrOXYzine (VISTARIL) 25 MG capsule Take 1 capsule (25 mg) by mouth 4 times daily as needed (Nausea) 60 capsule 1    Loperamide HCl (IMODIUM A-D PO)       methylphenidate (RITALIN LA) 40 MG 24 hr capsule Take 1 capsule (40 mg) by mouth daily 30 capsule 0    methylphenidate (RITALIN LA) 40 MG 24 hr capsule Take 1 capsule (40 mg) by mouth  daily 30 capsule 0    methylphenidate (RITALIN LA) 40 MG 24 hr capsule Take 1 capsule (40 mg) by mouth every morning 30 capsule 0    naproxen (NAPROSYN) 500 MG tablet Take 500 mg by mouth 2 times daily as needed for moderate pain      omeprazole (PRILOSEC OTC) 20 MG EC tablet Take 1 tablet (20 mg) by mouth daily      ondansetron (ZOFRAN-ODT) 4 MG ODT tab Take 1 tablet (4 mg) by mouth every 8 hours as needed for nausea 30 tablet 4    Prenatal MV & Min w/FA-DHA ([employer] PRENATAL GUMMY PO) Take 1 chew tab by mouth daily      prochlorperazine (COMPAZINE) 10 MG tablet TAKE 1 TABLET(10 MG) BY MOUTH EVERY 6 HOURS AS NEEDED FOR NAUSEA OR VOMITING 30 tablet 3    prochlorperazine (COMPAZINE) 10 MG tablet TAKE 1 TABLET(10 MG) BY MOUTH EVERY 6 HOURS AS NEEDED FOR NAUSEA OR VOMITING 30 tablet 3    triamcinolone (KENALOG) 0.1 % external cream Apply topically 3 times daily Apply  topically to affected area(s) 3 times daily. 80 g 3       Allergies: Other  [no clinical screening - see comments], Menthol, Soap & cleansers, and Pine tar     Past Medical History:   Diagnosis Date    Helicobacter pylori infection     No Comments Provided       Past Surgical History:   Procedure Laterality Date    ESOPHAGOSCOPY, GASTROSCOPY, DUODENOSCOPY (EGD), COMBINED N/A 3/18/2022    Procedure: ESOPHAGOGASTRODUODENOSCOPY, WITH BIOPSY;  Surgeon: Osmar Velez MD;  Location: [facility] OR    exision mass retrorectal and coccyx  06/09/2017    Ori Espinoza MD at [facility] [place]       ENT family history reviewed    Social History     Tobacco Use    Smoking status: Never    Smokeless tobacco: Never   Vaping Use    Vaping Use: Never used   Substance Use Topics    Alcohol use: Not Currently     Alcohol/week: 4.2 standard drinks of alcohol     Comment: occassional    Drug use: No       Review of Systems  ROS: 10 point ROS neg other than the symptoms noted above in the HPI     Physical Exam  BP (!) 130/97 (BP Location: Right arm, Patient Position: Sitting, Cuff Size:  "Adult Large)   Pulse 103   Temp 99  F (37.2  C) (Tympanic)   Resp 16   Ht 1.6 m (5' 3\")   Wt 101.3 kg (223 lb 6.4 oz)   LMP 09/08/2023   SpO2 98%   BMI 39.57 kg/m      General - The patient is well nourished and well developed, and appears to have good nutritional status.  Alert and oriented to person and place, answers questions and cooperates with examination appropriately.   Head and Face - Normocephalic and atraumatic, with no gross asymmetry noted.  The facial nerve is intact, with strong symmetric movements.  Voice and Breathing - The patient was breathing comfortably without the use of accessory muscles. There was no wheezing, stridor, or stertor.  The patients voice was clear and strong, and had appropriate pitch and quality.  Ears - Ears examined with otoscope and under otologic microscopy. The external auditory canals are patent there was scant cerumen, debris along distal canals cleaned with alligator forceps and cerumen loop, Canals appear wide base, the tympanic membranes are intact without effusion, retraction or mass.  Bony landmarks are intact.  Eyes - Extraocular movements intact, and the pupils were reactive to light.  Sclera were not icteric or injected, conjunctiva were pink and moist.  Mouth - Examination of the oral cavity showed pink, healthy oral mucosa. No lesions or ulcerations noted.  The tongue was mobile and midline, and the dentition were in good condition.    Throat - The walls of the oropharynx were smooth, pink, moist, symmetric, and had no lesions or ulcerations.  The tonsillar pillars and soft palate were symmetric.  The uvula was midline on elevation.    Neck - Normal midline excursion of the laryngotracheal complex during swallowing.  Full range of motion on passive movement.  Palpation of the occipital, submental, submandibular, internal jugular chain, and supraclavicular nodes did not demonstrate any abnormal lymph nodes or masses.  Palpation of the thyroid was soft and " smooth, with no nodules or goiter appreciated.  The trachea was mobile and midline.  Nose - External contour is symmetric, no gross deflection or scars.  Nasal mucosa is pink and moist with no abnormal mucus.        Impression and Plan- Tia Sethi is a 32 year old female with:    ICD-10-CM    1. Recurrent otitis externa of both ears  H60.93 Adult Audiology  Referral     acetic acid-hydrocortisone (VOSOL-HC) 1-2 % otic solution          Reassured no effusion/ infection today.     Start Vosol ear drops.  Use 2-3 times daily for 10 days to both ears.   Try to keep ears dry and avoid manipulation.     Complete audiogram.   Follow up in 6-8 weeks for recheck  If any new or worsening symptoms- return to ENT sooner.     May use Vinegar rinses if needed for drainage.     Samantha Rosa PA-C  ENT  [facility]

## 2023-10-18 LAB — BACTERIA SPEC CULT: NO GROWTH

## 2023-11-27 DIAGNOSIS — R11.2 INTRACTABLE NAUSEA AND VOMITING: ICD-10-CM

## 2023-11-28 DIAGNOSIS — R11.2 NAUSEA AND VOMITING, UNSPECIFIED VOMITING TYPE: Primary | ICD-10-CM

## 2023-11-28 RX ORDER — HYDROXYZINE PAMOATE 25 MG/1
25 CAPSULE ORAL 4 TIMES DAILY PRN
Qty: 60 CAPSULE | Refills: 1 | Status: SHIPPED | OUTPATIENT
Start: 2023-11-28

## 2023-11-28 RX ORDER — PROCHLORPERAZINE MALEATE 10 MG
TABLET ORAL
Qty: 30 TABLET | Refills: 3 | Status: SHIPPED | OUTPATIENT
Start: 2023-11-28 | End: 2024-01-11

## 2023-11-28 NOTE — TELEPHONE ENCOUNTER
[id number] [place] sent Rx request for the following:      Requested Prescriptions   Pending Prescriptions Disp Refills    hydrOXYzine (VISTARIL) 25 MG capsule 60 capsule 1     Sig: Take 1 capsule (25 mg) by mouth 4 times daily as needed (Nausea)   Last Prescription Date:   9/20/23  Last Fill Qty/Refills:         60, R-1    Last Office Visit:              5/28/23   Future Office visit:           None    Ordered by ED provider.    Unable to complete prescription refill per RN Medication Refill Policy.     Shannan Villanueva RN .............. 11/28/2023  2:39 PM

## 2023-11-28 NOTE — TELEPHONE ENCOUNTER
[facility] sent Rx request for the following:      Requested Prescriptions   Pending Prescriptions Disp Refills    prochlorperazine (COMPAZINE) 10 MG tablet [Pharmacy Med Name: PROCHLORPERAZINE 10MG TABLETS] 30 tablet 3     Sig: TAKE 1 TABLET(10 MG) BY MOUTH EVERY 6 HOURS AS NEEDED FOR NAUSEA OR VOMITING     Last Prescription Date:   5/18/23  Last Fill Qty/Refills:         30, R-3    Last Office Visit:              5/28/23   Future Office visit:           None    Signed by Dr. Waldron in Emergency Department.     Unable to complete prescription refill per RN Medication Refill Policy.     Shannan Villanueva RN .............. 11/28/2023  2:38 PM

## 2024-01-01 NOTE — PROGRESS NOTES
"Nursing Notes:   Leanne Recinos LPN  4/24/2018  4:25 PM  Signed  She is here today for medication refills.  Leanne Recinos LPN..................4/24/2018   4:22 PM    SUBJECTIVE:  Tia Vázquez  is a 27 year old female comes in today for medication refills.  I last saw her in February she continues on Ritalin LA 40 mg daily and it has been working well.    She is liking her job well.    She is continuing to follow with the wound clinic for her open area and is slowly healing.  She was to get her Depo-Provera shot today which is why she made the appointment but was able to get yesterday so decided to follow through with her appointment today to do her urine test and then will also refill her medicine for 3 months even though she still has one prescription that she has not filled.    Past Medical, Family, and Social History reviewed and updated as noted below.   ROS is negative except as noted above       Allergies   Allergen Reactions     Other  [No Clinical Screening - See Comments]      Other reaction(s): Throat Irritation  Carmex, Pine, Icy Hot     [facility] Cleansing Skin      Other reaction(s): Swelling     Menthol      Other reaction(s): Swelling     Pine Tar      Other reaction(s): Runny Nose   ,   Family History   Problem Relation Age of Onset     CANCER Father      Cancer, h/o testicular CA     HEART DISEASE Maternal Grandmother      Heart Disease,cardiomyopathy     Blood Disease Maternal Grandmother      Blood Disease,blood dyscrasia   ,   Current Outpatient Prescriptions   Medication     Calcium Alginate (ALGISITE M 4\"X4\") MISC     Gauze Pads & Dressings (T[place] DRESSING) 2\"X3-3/4\" PADS     medroxyPROGESTERone (DEPO-PROVERA) 150 MG/ML injection     methylphenidate (RITALIN LA) 40 MG CP24     methylphenidate (RITALIN LA) 40 MG CP24     naproxen (NAPROSYN) 500 MG tablet     sodium chloride 0.9 % SOLN     triamcinolone (KENALOG) 0.1 % cream     Wound Dressings (ALLEVYN GENTLE BORDER LITE) PADS     " "Progress Note - NICU   1 Baby Zak Moscoso (Riri Bowen MD        ferrous sulfate (HEIDI-IN-SOL) DISCONTINUED] medroxyPROGESTERone (DEPO-PROVERA) 150 MG/ML injection     No current facility-administered medications for this visit.    ,   Past Medical History:   Diagnosis Date     Helicobacter pylori infection     No Comments Provided   ,   Patient Active Problem List    Diagnosis Date Noted     Controlled substance agreement signed 02/20/2018     Priority: Medium     Allergic rhinitis due to pollen 01/30/2018     Priority: Medium     Overview:   Spring and fall       Contact dermatitis and eczema 01/30/2018     Priority: Medium     Overview:   Left ear       Menorrhagia with irregular cycle 01/30/2018     Priority: Medium     Chronic ulcer of sacral region with fat layer exposed (H) 09/26/2017     Priority: Medium     Other granulomatous disorders of the skin and subcutaneous tissue 09/26/2017     Priority: Medium     Mass of perirectal soft tissue 04/12/2017     Priority: Medium     Screening for malignant neoplasm of cervix 03/17/2016     Priority: Medium     Overview:   2016 NILM  PLAN: PAP TEST 3/2019  ; PAP TEST HISTORY       ADHD 09/24/2010     Priority: Medium     Overview:   Evaluation from [facility] done.     ,   Past Surgical History:   Procedure Laterality Date     exision mass retrorectal and coccyx  06/09/2017    Ori Espinoza MD at [facility]    and   Social History   Substance Use Topics     Smoking status: Never Smoker     Smokeless tobacco: Never Used     Alcohol use 2.5 oz/week     OBJECTIVE:  /88 (BP Location: Right arm, Patient Position: Chair, Cuff Size: Adult Large)  Pulse 84  Wt 206 lb (93.4 kg)  Breastfeeding? No  BMI 36.49 kg/m2   EXAM:  Alert and cooperative, no distress.  Affect is broad ranging and appropriate.  ASSESSMENT/Plan :    Tia was seen today for recheck medication.    Diagnoses and all orders for this visit:    Attention deficit hyperactivity disorder (ADHD), unspecified ADHD type  -     Pain Drug Scr UR W Rptd Meds    Controlled substance  pregnancy. Baby Zak Retana was breech, vigorous at birth, agreement signed  -     Pain Drug Scr UR W Rptd Meds       query is appropriate.  ToxAssure today.  Renewal on Ritalin LA 40 mg #30×3 prescriptions for May Renita and July given.  Follow-up when she needs refills again.  Sooner if problems or questions    Nicho Peoples MD               2/22 TSB 8.5  2/23  bili 11.5, photo  2/24 

## 2024-01-11 DIAGNOSIS — R11.2 INTRACTABLE NAUSEA AND VOMITING: ICD-10-CM

## 2024-01-11 RX ORDER — PROCHLORPERAZINE MALEATE 10 MG
TABLET ORAL
Qty: 30 TABLET | Refills: 3 | Status: SHIPPED | OUTPATIENT
Start: 2024-01-11

## 2024-01-11 NOTE — TELEPHONE ENCOUNTER
[facility] sent Rx request for the following:      Requested Prescriptions   Pending Prescriptions Disp Refills    prochlorperazine (COMPAZINE) 10 MG tablet [Pharmacy Med Name: PROCHLORPERAZINE 10MG TABLETS] 30 tablet 3     Sig: TAKE 1 TABLET(10 MG) BY MOUTH EVERY 6 HOURS AS NEEDED FOR NAUSEA OR VOMITING       11/28/23Sent(none)Nicho Peoples [facility] EMERGENCY DEPT             Last Prescription Date:   11/28/23  Last Fill Qty/Refills:         30, R-3    Last Office Visit:              5/25/23   Future Office visit:           None    Unable to complete prescription refill per RN Medication Refill Policy.     Shannan Villanueva RN .............. 1/11/2024  10:26 AM

## 2024-02-05 ENCOUNTER — OFFICE VISIT (OUTPATIENT)
Dept: FAMILY MEDICINE | Facility: OTHER | Age: 34
End: 2024-02-05
Attending: FAMILY MEDICINE
Payer: COMMERCIAL

## 2024-02-05 VITALS
DIASTOLIC BLOOD PRESSURE: 98 MMHG | TEMPERATURE: 96.9 F | HEART RATE: 115 BPM | BODY MASS INDEX: 39.04 KG/M2 | OXYGEN SATURATION: 98 % | SYSTOLIC BLOOD PRESSURE: 132 MMHG | WEIGHT: 220.4 LBS | RESPIRATION RATE: 16 BRPM

## 2024-02-05 DIAGNOSIS — H60.502 ACUTE OTITIS EXTERNA OF LEFT EAR, UNSPECIFIED TYPE: ICD-10-CM

## 2024-02-05 DIAGNOSIS — R11.2 NAUSEA AND VOMITING, UNSPECIFIED VOMITING TYPE: Primary | ICD-10-CM

## 2024-02-05 DIAGNOSIS — E88.819 INSULIN RESISTANCE: ICD-10-CM

## 2024-02-05 DIAGNOSIS — E66.01 CLASS 2 SEVERE OBESITY DUE TO EXCESS CALORIES WITH SERIOUS COMORBIDITY AND BODY MASS INDEX (BMI) OF 39.0 TO 39.9 IN ADULT (H): ICD-10-CM

## 2024-02-05 DIAGNOSIS — G47.00 INSOMNIA, UNSPECIFIED TYPE: ICD-10-CM

## 2024-02-05 DIAGNOSIS — E66.812 CLASS 2 SEVERE OBESITY DUE TO EXCESS CALORIES WITH SERIOUS COMORBIDITY AND BODY MASS INDEX (BMI) OF 39.0 TO 39.9 IN ADULT (H): ICD-10-CM

## 2024-02-05 PROCEDURE — 99214 OFFICE O/P EST MOD 30 MIN: CPT | Performed by: FAMILY MEDICINE

## 2024-02-05 RX ORDER — OFLOXACIN 3 MG/ML
5 SOLUTION AURICULAR (OTIC) DAILY
Qty: 5 ML | Refills: 0 | Status: SHIPPED | OUTPATIENT
Start: 2024-02-05 | End: 2024-02-12

## 2024-02-05 RX ORDER — ZOLPIDEM TARTRATE 10 MG/1
10 TABLET ORAL
Qty: 30 TABLET | Refills: 5 | Status: SHIPPED | OUTPATIENT
Start: 2024-02-05

## 2024-02-05 ASSESSMENT — PAIN SCALES - GENERAL: PAINLEVEL: NO PAIN (0)

## 2024-02-05 NOTE — PROGRESS NOTES
Ammon Weber is a 33 year old, presenting for the following health issues:  RECHECK (Stomach issues)      2/5/2024    10:19 AM   Additional Questions   Roomed by Leanne Recinos LPN     History of Present Illness       Reason for visit:  Ear infection and medications review    She eats 2-3 servings of fruits and vegetables daily.She consumes 1 sweetened beverage(s) daily.She exercises with enough effort to increase her heart rate 20 to 29 minutes per day.  She exercises with enough effort to increase her heart rate 3 or less days per week.   She is taking medications regularly.                     Objective    BP (!) 132/98   Pulse 115   Temp 96.9  F (36.1  C) (Temporal)   Resp 16   Wt 100 kg (220 lb 6.4 oz)   LMP 01/22/2024 (Exact Date)   SpO2 98%   Breastfeeding No   BMI 39.04 kg/m    Body mass index is 39.04 kg/m .  Physical Exam               Signed Electronically by: Nicho Peoples MD

## 2024-02-05 NOTE — PROGRESS NOTES
"Nursing Notes:   Leanne Recinos LPN  2/5/2024 10:45 AM  Signed  Chief Complaint   Patient presents with    RECHECK     Stomach issues       Initial BP (!) 132/98   Pulse 115   Temp 96.9  F (36.1  C) (Temporal)   Resp 16   Wt 100 kg (220 lb 6.4 oz)   LMP 01/22/2024 (Exact Date)   SpO2 98%   Breastfeeding No   BMI 39.04 kg/m   Estimated body mass index is 39.04 kg/m  as calculated from the following:    Height as of 10/11/23: 1.6 m (5' 3\").    Weight as of this encounter: 100 kg (220 lb 6.4 oz).  Medication Review: complete    The next two questions are to help us understand your food security.  If you are feeling you need any assistance in this area, we have resources available to support you today.          2/5/2024   SDOH- Food Insecurity   Within the past 12 months, did you worry that your food would run out before you got money to buy more? N   Within the past 12 months, did the food you bought just not last and you didn t have money to get more? N         Health Care Directive:  Patient does not have a Health Care Directive or Living Will: Discussed advance care planning with patient; however, patient declined at this time.    Leanne Recinos LPN      SUBJECTIVE:  Tia Sethi  is a 33 year old female who comes in for ongoing stomach issues with nausea and vomiting.  She has had cyclic vomiting syndrome.  She was in the emergency room in September and was positive for cannabinoids.  She was sent home with a prescription for hydroxyzine in addition to Compazine.  She had been followed by gastroenterology in [place].  I did I ordered a gastric emptying study and put her on the amitriptyline for presumed cyclic vomiting syndrome with the idea of titrating it up to 75 to 100 mg.  Appears that she saw them once and then did not follow through with follow-up appointments.  They were not going to fill the amitriptyline unless she followed up with her provider. She has an appointment for Friday. "     She also had trouble with an external ear infection.  She has been followed by ENT.  She has done well but now has some symptoms on the left again with drainage and pain.  No fever    She started a leave since January 15 and goes back next week. She has been less sick and vomited only twice.  She was vomiting 5-15 times/day.  She was dry heaving at least 30 times per week.     She is not sleeping well. The Rozerem is not working. Restoril didn't work.  She feels that she can get good sleep her anxiety would be much less.  She has not tried Ambien and was a little reluctant to do so.    Past Medical, Family, and Social History reviewed and updated as noted below.   ROS is negative except as noted above       Allergies   Allergen Reactions    Other  [No Clinical Screening - See Comments]      Other reaction(s): Throat Irritation  Carmex, Pine, Icy Hot    Menthol      Other reaction(s): Swelling    Pine Tar      Other reaction(s): Runny Nose   ,   Family History   Problem Relation Age of Onset    Cancer Father         Cancer, h/o testicular CA    Heart Disease Maternal Grandmother         Heart Disease,cardiomyopathy    Blood Disease Maternal Grandmother         Blood Disease,blood dyscrasia   ,   Current Outpatient Medications   Medication    amitriptyline (ELAVIL) 25 MG tablet    clonazePAM (KLONOPIN) 1 MG tablet    hydrOXYzine (VISTARIL) 25 MG capsule    methylphenidate (RITALIN LA) 40 MG 24 hr capsule    methylphenidate (RITALIN LA) 40 MG 24 hr capsule    methylphenidate (RITALIN LA) 40 MG 24 hr capsule    naproxen (NAPROSYN) 500 MG tablet    ofloxacin (FLOXIN) 0.3 % otic solution    omeprazole (PRILOSEC OTC) 20 MG EC tablet    Prenatal MV & Min w/FA-DHA ([employer] PRENATAL GUMMY PO)    prochlorperazine (COMPAZINE) 10 MG tablet    prochlorperazine (COMPAZINE) 10 MG tablet    Semaglutide-Weight Management (WEGOVY) 0.25 MG/0.5ML pen    Semaglutide-Weight Management (WEGOVY) 0.5 MG/0.5ML pen    triamcinolone (KENALOG)  0.1 % external cream    zolpidem (AMBIEN) 10 MG tablet    choriogonadotropin davon (OVIDREL) 250 MCG/0.5ML injection     No current facility-administered medications for this visit.   ,   Past Medical History:   Diagnosis Date    Helicobacter pylori infection     No Comments Provided   ,   Patient Active Problem List    Diagnosis Date Noted    Iron deficiency anemia due to chronic blood loss 03/18/2022     Priority: Medium    Intractable nausea and vomiting 03/16/2022     Priority: Medium    Hypokalemia 03/16/2022     Priority: Medium    Hypovolemia 03/16/2022     Priority: Medium    Anxiety 02/05/2019     Priority: Medium    Controlled substance agreement signed 2/20/18, 7/2/19, 10/26/2020 02/20/2018     Priority: Medium    Allergic rhinitis due to pollen 01/30/2018     Priority: Medium     Overview:   Spring and fall      Contact dermatitis and eczema 01/30/2018     Priority: Medium     Overview:   Left ear      Menorrhagia with irregular cycle 01/30/2018     Priority: Medium    Chronic ulcer of sacral region with fat layer exposed (H) 09/26/2017     Priority: Medium    Other granulomatous disorders of the skin and subcutaneous tissue 09/26/2017     Priority: Medium    Mass of perirectal soft tissue 04/12/2017     Priority: Medium    Screening for malignant neoplasm of cervix 03/17/2016     Priority: Medium     Overview:   2016 NILM  PLAN: PAP TEST 3/2019  ; PAP TEST HISTORY      ADHD 09/24/2010     Priority: Medium     Overview:   Evaluation from [facility] done.     ,   Past Surgical History:   Procedure Laterality Date    ESOPHAGOSCOPY, GASTROSCOPY, DUODENOSCOPY (EGD), COMBINED N/A 3/18/2022    Procedure: ESOPHAGOGASTRODUODENOSCOPY, WITH BIOPSY;  Surgeon: Osmar Velez MD;  Location: [facility] OR    exision mass retrorectal and coccyx  06/09/2017    Ori Espinoza MD at [facility]    and   Social History     Tobacco Use    Smoking status: Never     Passive exposure: Never    Smokeless tobacco: Never    Substance Use Topics    Alcohol use: Not Currently     Alcohol/week: 4.2 standard drinks of alcohol     Comment: occassional     OBJECTIVE:  BP (!) 132/98   Pulse 115   Temp 96.9  F (36.1  C) (Temporal)   Resp 16   Wt 100 kg (220 lb 6.4 oz)   LMP 01/22/2024 (Exact Date)   SpO2 98%   Breastfeeding No   BMI 39.04 kg/m     EXAM:  Alert and cooperative, no distress.  Affect is broad range and appropriate.  Right TM and external canal are clear.  Left external canal is swollen and some green debris is seen in the canal.  ASSESSMENT/Plan :    Tia was seen today for recheck.    Diagnoses and all orders for this visit:    Nausea and vomiting, unspecified vomiting type    Insomnia, unspecified type  -     zolpidem (AMBIEN) 10 MG tablet; Take 1 tablet (10 mg) by mouth nightly as needed for sleep    Class 2 severe obesity due to excess calories with serious comorbidity and body mass index (BMI) of 39.0 to 39.9 in adult (H)  -     Semaglutide-Weight Management (WEGOVY) 0.25 MG/0.5ML pen; Inject 0.25 mg Subcutaneous once a week  -     Semaglutide-Weight Management (WEGOVY) 0.5 MG/0.5ML pen; Inject 0.5 mg Subcutaneous once a week    Insulin resistance  -     Semaglutide-Weight Management (WEGOVY) 0.25 MG/0.5ML pen; Inject 0.25 mg Subcutaneous once a week  -     Semaglutide-Weight Management (WEGOVY) 0.5 MG/0.5ML pen; Inject 0.5 mg Subcutaneous once a week    Acute otitis externa of left ear, unspecified type  -     ofloxacin (FLOXIN) 0.3 % otic solution; Place 5 drops Into the left ear daily for 7 days      Will treat her left ear with Floxin otic.  After 7 days, will use home remedy for swimmer's ear prophylaxis and she was given a recipe for that.      Her vomiting seems to be definitely precipitated more by anxiety and stress.  Appropriate sleep is certainly reasonable.  Will do a trial of Ambien and instructed on use.  Discussed potential side effects.  She has been on 25 mg of amitriptyline and it was the plan  of the gastroenterologist to increase that to 75 to 100 mg if tolerated.  She will try the Ambien for few days and she meets with GI later this week.  Might consider doing the amitriptyline and holding off on the Ambien as well but will leave that to the discretion of the specialist.    Questions today about GLP-1's.  She has struggled with her weight for a number of years.  Discussed referral to medical weight management.  She wondered about trying semaglutide and I thought it was reasonable to do so we had a lengthy discussion in this regard.  Will send the prescription to see if she will have an unreasonable out-of-pocket cost.  If it is affordable, she will start with 0.25 mg weekly for a month increasing to 0.5 mg thereafter and follow-up with me in 2 months for consideration of dose escalation.  Advised that she probably would not lose much in the first month or 2.  This needs to be combined with a healthy diet and lifestyle and she is aware of that.    A total of 35 minutes was spent with the patient, reviewing records, tests, ordering medications, tests or procedures and documenting clinical information in the EHR.     Nicho Peoples MD    Answers submitted by the patient for this visit:  General Questionnaire (Submitted on 2/5/2024)  Chief Complaint: Chronic problems general questions HPI Form  What is the reason for your visit today? : ear infection and medications review  How many servings of fruits and vegetables do you eat daily?: 2-3  On average, how many sweetened beverages do you drink each day (Examples: soda, juice, sweet tea, etc.  Do NOT count diet or artificially sweetened beverages)?: 1  How many minutes a day do you exercise enough to make your heart beat faster?: 20 to 29  How many days a week do you exercise enough to make your heart beat faster?: 3 or less  How many days per week do you miss taking your medication?: 0

## 2024-02-05 NOTE — COMMUNITY RESOURCES LIST (ENGLISH)
02/05/2024   [facility]  N/A  For questions about this resource list or additional care needs, please contact your primary care clinic or care manager.  Phone: [phone]   Email: N/A   Address: [address] [place] [id number]   Hours: N/A        Transportation       Free or low-cost transportation  1  [facility] [employer] [facility] - Free or Low-cost Transportation Distance: 0.53 miles      In-Person   [address] [place] [id number]  Language: English  Hours: Mon - Fri 8:00 AM - 4:30 PM  Fees: Free   Phone: [phone] Website: [email or web address]          Important Numbers & Websites       Emergency Services   911  [facility]   311  Poison Control   [phone]  Suicide Prevention Lifeline   (253) 301-6501 (TALK)  Child Abuse Hotline   (422) 197-4553 (4-A-Child)  Sexual Assault Hotline   (199) 849-4716 (HOPE)  National Runaway Safeline   (398) 352-1414 (RUNAWAY)  All-Options Talkline   (340) 407-7489  Substance Abuse Referral   ([phone] [phone] (HELP)

## 2024-02-05 NOTE — PATIENT INSTRUCTIONS
Swimmer's ear prevention:      One part white vinegar  One part rubbing alcohol  One part warm water    Dip cotton ball into solution, then place 2 drops in each ear canal after swimming.

## 2024-02-05 NOTE — NURSING NOTE
"Chief Complaint   Patient presents with    RECHECK     Stomach issues       Initial BP (!) 132/98   Pulse 115   Temp 96.9  F (36.1  C) (Temporal)   Resp 16   Wt 100 kg (220 lb 6.4 oz)   LMP 01/22/2024 (Exact Date)   SpO2 98%   Breastfeeding No   BMI 39.04 kg/m   Estimated body mass index is 39.04 kg/m  as calculated from the following:    Height as of 10/11/23: 1.6 m (5' 3\").    Weight as of this encounter: 100 kg (220 lb 6.4 oz).  Medication Review: complete    The next two questions are to help us understand your food security.  If you are feeling you need any assistance in this area, we have resources available to support you today.          2/5/2024   SDOH- Food Insecurity   Within the past 12 months, did you worry that your food would run out before you got money to buy more? N   Within the past 12 months, did the food you bought just not last and you didn t have money to get more? N         Health Care Directive:  Patient does not have a Health Care Directive or Living Will: Discussed advance care planning with patient; however, patient declined at this time.    Leanne Recinos LPN      "

## 2024-03-22 ENCOUNTER — E-VISIT (OUTPATIENT)
Dept: URGENT CARE | Facility: CLINIC | Age: 34
End: 2024-03-22
Payer: COMMERCIAL

## 2024-03-22 DIAGNOSIS — R06.02 SOB (SHORTNESS OF BREATH): Primary | ICD-10-CM

## 2024-03-22 PROCEDURE — 99207 PR NON-BILLABLE SERV PER CHARTING: CPT | Performed by: PHYSICIAN ASSISTANT

## 2024-03-22 NOTE — PATIENT INSTRUCTIONS
Dear Tia Sethi,    We are sorry you are not feeling well. Based on the responses you provided, it is recommended that you be seen in-person in urgent care so we can better evaluate your symptoms. Please click here to find the nearest urgent care location to you.   You will not be charged for this Visit. Thank you for trusting us with your care.    Jasper Escobedo PA-C

## 2024-07-13 ENCOUNTER — HEALTH MAINTENANCE LETTER (OUTPATIENT)
Age: 34
End: 2024-07-13

## 2024-07-30 NOTE — NURSING NOTE
Tia Vázquez is a 27 year old female who presents today for wound care of the coccyx region.  Niya Matta LPN.......4/2/2018.......7:47 AM     none

## 2025-01-02 ENCOUNTER — OFFICE VISIT (OUTPATIENT)
Dept: FAMILY MEDICINE | Facility: OTHER | Age: 35
End: 2025-01-02
Attending: STUDENT IN AN ORGANIZED HEALTH CARE EDUCATION/TRAINING PROGRAM
Payer: COMMERCIAL

## 2025-01-02 VITALS
TEMPERATURE: 97.6 F | WEIGHT: 196.5 LBS | RESPIRATION RATE: 18 BRPM | SYSTOLIC BLOOD PRESSURE: 130 MMHG | BODY MASS INDEX: 34.82 KG/M2 | OXYGEN SATURATION: 99 % | DIASTOLIC BLOOD PRESSURE: 84 MMHG | HEART RATE: 102 BPM | HEIGHT: 63 IN

## 2025-01-02 DIAGNOSIS — H60.393 INFECTIVE OTITIS EXTERNA, BILATERAL: ICD-10-CM

## 2025-01-02 DIAGNOSIS — R11.2 INTRACTABLE NAUSEA AND VOMITING: ICD-10-CM

## 2025-01-02 DIAGNOSIS — E66.811 CLASS 1 OBESITY DUE TO EXCESS CALORIES WITH BODY MASS INDEX (BMI) OF 34.0 TO 34.9 IN ADULT, UNSPECIFIED WHETHER SERIOUS COMORBIDITY PRESENT: ICD-10-CM

## 2025-01-02 DIAGNOSIS — N94.3 PMS (PREMENSTRUAL SYNDROME): ICD-10-CM

## 2025-01-02 DIAGNOSIS — K21.9 GASTROESOPHAGEAL REFLUX DISEASE WITHOUT ESOPHAGITIS: ICD-10-CM

## 2025-01-02 DIAGNOSIS — R11.15 CYCLICAL VOMITING: ICD-10-CM

## 2025-01-02 DIAGNOSIS — Z00.00 ROUTINE GENERAL MEDICAL EXAMINATION AT A HEALTH CARE FACILITY: Primary | ICD-10-CM

## 2025-01-02 DIAGNOSIS — L30.9 ECZEMA, UNSPECIFIED TYPE: ICD-10-CM

## 2025-01-02 DIAGNOSIS — E66.09 CLASS 1 OBESITY DUE TO EXCESS CALORIES WITH BODY MASS INDEX (BMI) OF 34.0 TO 34.9 IN ADULT, UNSPECIFIED WHETHER SERIOUS COMORBIDITY PRESENT: ICD-10-CM

## 2025-01-02 LAB
ALBUMIN SERPL BCG-MCNC: 4.5 G/DL (ref 3.5–5.2)
ALP SERPL-CCNC: 75 U/L (ref 40–150)
ALT SERPL W P-5'-P-CCNC: 18 U/L (ref 0–50)
ANION GAP SERPL CALCULATED.3IONS-SCNC: 10 MMOL/L (ref 7–15)
AST SERPL W P-5'-P-CCNC: 19 U/L (ref 0–45)
BASOPHILS # BLD AUTO: 0.1 10E3/UL (ref 0–0.2)
BASOPHILS NFR BLD AUTO: 1 %
BILIRUB SERPL-MCNC: 0.2 MG/DL
BUN SERPL-MCNC: 17.5 MG/DL (ref 6–20)
CALCIUM SERPL-MCNC: 9.1 MG/DL (ref 8.8–10.4)
CHLORIDE SERPL-SCNC: 102 MMOL/L (ref 98–107)
CHOLEST SERPL-MCNC: 188 MG/DL
CREAT SERPL-MCNC: 0.86 MG/DL (ref 0.51–0.95)
EGFRCR SERPLBLD CKD-EPI 2021: >90 ML/MIN/1.73M2
EOSINOPHIL # BLD AUTO: 0.2 10E3/UL (ref 0–0.7)
EOSINOPHIL NFR BLD AUTO: 2 %
ERYTHROCYTE [DISTWIDTH] IN BLOOD BY AUTOMATED COUNT: 18.3 % (ref 10–15)
FASTING STATUS PATIENT QL REPORTED: NO
FASTING STATUS PATIENT QL REPORTED: NO
GLUCOSE SERPL-MCNC: 93 MG/DL (ref 70–99)
HCO3 SERPL-SCNC: 25 MMOL/L (ref 22–29)
HCT VFR BLD AUTO: 32.3 % (ref 35–47)
HDLC SERPL-MCNC: 49 MG/DL
HGB BLD-MCNC: 9.8 G/DL (ref 11.7–15.7)
IMM GRANULOCYTES # BLD: 0 10E3/UL
IMM GRANULOCYTES NFR BLD: 0 %
LDLC SERPL CALC-MCNC: 116 MG/DL
LYMPHOCYTES # BLD AUTO: 2.9 10E3/UL (ref 0.8–5.3)
LYMPHOCYTES NFR BLD AUTO: 30 %
MCH RBC QN AUTO: 23.6 PG (ref 26.5–33)
MCHC RBC AUTO-ENTMCNC: 30.3 G/DL (ref 31.5–36.5)
MCV RBC AUTO: 78 FL (ref 78–100)
MONOCYTES # BLD AUTO: 0.6 10E3/UL (ref 0–1.3)
MONOCYTES NFR BLD AUTO: 6 %
NEUTROPHILS # BLD AUTO: 5.7 10E3/UL (ref 1.6–8.3)
NEUTROPHILS NFR BLD AUTO: 61 %
NONHDLC SERPL-MCNC: 139 MG/DL
NRBC # BLD AUTO: 0 10E3/UL
NRBC BLD AUTO-RTO: 0 /100
PLATELET # BLD AUTO: 462 10E3/UL (ref 150–450)
POTASSIUM SERPL-SCNC: 4 MMOL/L (ref 3.4–5.3)
PROT SERPL-MCNC: 7.9 G/DL (ref 6.4–8.3)
RBC # BLD AUTO: 4.16 10E6/UL (ref 3.8–5.2)
SODIUM SERPL-SCNC: 137 MMOL/L (ref 135–145)
TRIGL SERPL-MCNC: 113 MG/DL
TSH SERPL DL<=0.005 MIU/L-ACNC: 1.46 UIU/ML (ref 0.3–4.2)
WBC # BLD AUTO: 9.5 10E3/UL (ref 4–11)

## 2025-01-02 PROCEDURE — 82310 ASSAY OF CALCIUM: CPT | Mod: ZL | Performed by: STUDENT IN AN ORGANIZED HEALTH CARE EDUCATION/TRAINING PROGRAM

## 2025-01-02 PROCEDURE — 36415 COLL VENOUS BLD VENIPUNCTURE: CPT | Mod: ZL | Performed by: STUDENT IN AN ORGANIZED HEALTH CARE EDUCATION/TRAINING PROGRAM

## 2025-01-02 PROCEDURE — 82465 ASSAY BLD/SERUM CHOLESTEROL: CPT | Mod: ZL | Performed by: STUDENT IN AN ORGANIZED HEALTH CARE EDUCATION/TRAINING PROGRAM

## 2025-01-02 PROCEDURE — 84443 ASSAY THYROID STIM HORMONE: CPT | Mod: ZL | Performed by: STUDENT IN AN ORGANIZED HEALTH CARE EDUCATION/TRAINING PROGRAM

## 2025-01-02 PROCEDURE — 85004 AUTOMATED DIFF WBC COUNT: CPT | Mod: ZL | Performed by: STUDENT IN AN ORGANIZED HEALTH CARE EDUCATION/TRAINING PROGRAM

## 2025-01-02 RX ORDER — ZOLPIDEM TARTRATE 12.5 MG/1
1 TABLET, FILM COATED, EXTENDED RELEASE ORAL
COMMUNITY
Start: 2024-12-18

## 2025-01-02 RX ORDER — PROCHLORPERAZINE MALEATE 10 MG
10 TABLET ORAL EVERY 6 HOURS PRN
Qty: 30 TABLET | Refills: 3 | Status: SHIPPED | OUTPATIENT
Start: 2025-01-02

## 2025-01-02 RX ORDER — OMEPRAZOLE 20 MG/1
20 TABLET, DELAYED RELEASE ORAL DAILY
Qty: 90 TABLET | Refills: 4 | Status: SHIPPED | OUTPATIENT
Start: 2025-01-02

## 2025-01-02 RX ORDER — NAPROXEN 500 MG/1
500 TABLET ORAL 2 TIMES DAILY PRN
Qty: 90 TABLET | Refills: 4 | Status: SHIPPED | OUTPATIENT
Start: 2025-01-02

## 2025-01-02 RX ORDER — CIPROFLOXACIN AND DEXAMETHASONE 3; 1 MG/ML; MG/ML
4 SUSPENSION/ DROPS AURICULAR (OTIC) 2 TIMES DAILY
Qty: 7.5 ML | Refills: 3 | Status: SHIPPED | OUTPATIENT
Start: 2025-01-02 | End: 2025-01-09

## 2025-01-02 RX ORDER — AMITRIPTYLINE HYDROCHLORIDE 75 MG/1
75 TABLET ORAL AT BEDTIME
Qty: 90 TABLET | Refills: 4 | Status: SHIPPED | OUTPATIENT
Start: 2025-01-02

## 2025-01-02 RX ORDER — TRIAMCINOLONE ACETONIDE 1 MG/G
CREAM TOPICAL 2 TIMES DAILY
Qty: 80 G | Refills: 3 | Status: SHIPPED | OUTPATIENT
Start: 2025-01-02

## 2025-01-02 SDOH — HEALTH STABILITY: PHYSICAL HEALTH: ON AVERAGE, HOW MANY DAYS PER WEEK DO YOU ENGAGE IN MODERATE TO STRENUOUS EXERCISE (LIKE A BRISK WALK)?: 4 DAYS

## 2025-01-02 ASSESSMENT — ANXIETY QUESTIONNAIRES
GAD7 TOTAL SCORE: 2
8. IF YOU CHECKED OFF ANY PROBLEMS, HOW DIFFICULT HAVE THESE MADE IT FOR YOU TO DO YOUR WORK, TAKE CARE OF THINGS AT HOME, OR GET ALONG WITH OTHER PEOPLE?: NOT DIFFICULT AT ALL
7. FEELING AFRAID AS IF SOMETHING AWFUL MIGHT HAPPEN: NOT AT ALL
7. FEELING AFRAID AS IF SOMETHING AWFUL MIGHT HAPPEN: NOT AT ALL
6. BECOMING EASILY ANNOYED OR IRRITABLE: NOT AT ALL
3. WORRYING TOO MUCH ABOUT DIFFERENT THINGS: NOT AT ALL
4. TROUBLE RELAXING: NOT AT ALL
5. BEING SO RESTLESS THAT IT IS HARD TO SIT STILL: NOT AT ALL
2. NOT BEING ABLE TO STOP OR CONTROL WORRYING: SEVERAL DAYS
GAD7 TOTAL SCORE: 2
1. FEELING NERVOUS, ANXIOUS, OR ON EDGE: SEVERAL DAYS
IF YOU CHECKED OFF ANY PROBLEMS ON THIS QUESTIONNAIRE, HOW DIFFICULT HAVE THESE PROBLEMS MADE IT FOR YOU TO DO YOUR WORK, TAKE CARE OF THINGS AT HOME, OR GET ALONG WITH OTHER PEOPLE: NOT DIFFICULT AT ALL
GAD7 TOTAL SCORE: 2

## 2025-01-02 ASSESSMENT — SOCIAL DETERMINANTS OF HEALTH (SDOH): HOW OFTEN DO YOU GET TOGETHER WITH FRIENDS OR RELATIVES?: THREE TIMES A WEEK

## 2025-01-02 ASSESSMENT — PAIN SCALES - GENERAL: PAINLEVEL_OUTOF10: NO PAIN (0)

## 2025-01-02 NOTE — NURSING NOTE
"Chief Complaint   Patient presents with    Physical     Annual exam    Establish Care     With Martina Waterman MD        Initial /84 (BP Location: Right arm, Patient Position: Sitting, Cuff Size: Adult Large)   Pulse 102   Temp 97.6  F (36.4  C) (Tympanic)   Resp 18   Ht 1.6 m (5' 3\")   Wt 89.1 kg (196 lb 8 oz)   LMP 12/22/2024 (Approximate)   SpO2 99%   BMI 34.81 kg/m   Estimated body mass index is 34.81 kg/m  as calculated from the following:    Height as of this encounter: 1.6 m (5' 3\").    Weight as of this encounter: 89.1 kg (196 lb 8 oz).  Medication Review: complete    The next two questions are to help us understand your food security.  If you are feeling you need any assistance in this area, we have resources available to support you today.          1/2/2025   SDOH- Food Insecurity   Within the past 12 months, did you worry that your food would run out before you got money to buy more? N   Within the past 12 months, did the food you bought just not last and you didn t have money to get more? N         Health Care Directive:  Patient does not have a Health Care Directive: Discussed advance care planning with patient; however, patient declined at this time.    Shannan Aguirre LPN      "

## 2025-01-02 NOTE — PATIENT INSTRUCTIONS
"\"The Obesity Code\" by Akira Arreola       Patient Education   Preventive Care Advice   This is general advice given by our system to help you stay healthy. However, your care team may have specific advice just for you. Please talk to your care team about your preventive care needs.  Nutrition  Eat 5 or more servings of fruits and vegetables each day.  Try wheat bread, brown rice and whole grain pasta (instead of white bread, rice, and pasta).  Get enough calcium and vitamin D. Check the label on foods and aim for 100% of the RDA (recommended daily allowance).  Lifestyle  Exercise at least 150 minutes each week  (30 minutes a day, 5 days a week).  Do muscle strengthening activities 2 days a week. These help control your weight and prevent disease.  No smoking.  Wear sunscreen to prevent skin cancer.  Have a dental exam and cleaning every 6 months.  Yearly exams  See your health care team every year to talk about:  Any changes in your health.  Any medicines your care team has prescribed.  Preventive care, family planning, and ways to prevent chronic diseases.  Shots (vaccines)   HPV shots (up to age 26), if you've never had them before.  Hepatitis B shots (up to age 59), if you've never had them before.  COVID-19 shot: Get this shot when it's due.  Flu shot: Get a flu shot every year.  Tetanus shot: Get a tetanus shot every 10 years.  Pneumococcal, hepatitis A, and RSV shots: Ask your care team if you need these based on your risk.  Shingles shot (for age 50 and up)  General health tests  Diabetes screening:  Starting at age 35, Get screened for diabetes at least every 3 years.  If you are younger than age 35, ask your care team if you should be screened for diabetes.  Cholesterol test: At age 39, start having a cholesterol test every 5 years, or more often if advised.  Bone density scan (DEXA): At age 50, ask your care team if you should have this scan for osteoporosis (brittle bones).  Hepatitis C: Get tested at least " once in your life.  STIs (sexually transmitted infections)  Before age 24: Ask your care team if you should be screened for STIs.  After age 24: Get screened for STIs if you're at risk. You are at risk for STIs (including HIV) if:  You are sexually active with more than one person.  You don't use condoms every time.  You or a partner was diagnosed with a sexually transmitted infection.  If you are at risk for HIV, ask about PrEP medicine to prevent HIV.  Get tested for HIV at least once in your life, whether you are at risk for HIV or not.  Cancer screening tests  Cervical cancer screening: If you have a cervix, begin getting regular cervical cancer screening tests starting at age 21.  Breast cancer scan (mammogram): If you've ever had breasts, begin having regular mammograms starting at age 40. This is a scan to check for breast cancer.  Colon cancer screening: It is important to start screening for colon cancer at age 45.  Have a colonoscopy test every 10 years (or more often if you're at risk) Or, ask your provider about stool tests like a FIT test every year or Cologuard test every 3 years.  To learn more about your testing options, visit:   .  For help making a decision, visit:   https://bit.ly/yn06609.  Prostate cancer screening test: If you have a prostate, ask your care team if a prostate cancer screening test (PSA) at age 55 is right for you.  Lung cancer screening: If you are a current or former smoker ages 50 to 80, ask your care team if ongoing lung cancer screenings are right for you.  For informational purposes only. Not to replace the advice of your health care provider. Copyright   2023 [facility]. All rights reserved. Clinically reviewed by the [facility]. [employer] [id number] - REV 01/24.  Learning About Stress  What is stress?     Stress is your body's response to a hard situation. Your body can have a physical, emotional, or mental response. Stress is a fact  of life for most people, and it affects everyone differently. What causes stress for you may not be stressful for someone else.  A lot of things can cause stress. You may feel stress when you go on a job interview, take a test, or run a race. This kind of short-term stress is normal and even useful. It can help you if you need to work hard or react quickly. For example, stress can help you finish an important job on time.  Long-term stress is caused by ongoing stressful situations or events. Examples of long-term stress include long-term health problems, ongoing problems at work, or conflicts in your family. Long-term stress can harm your health.  How does stress affect your health?  When you are stressed, your body responds as though you are in danger. It makes hormones that speed up your heart, make you breathe faster, and give you a burst of energy. This is called the fight-or-flight stress response. If the stress is over quickly, your body goes back to normal and no harm is done.  But if stress happens too often or lasts too long, it can have bad effects. Long-term stress can make you more likely to get sick, and it can make symptoms of some diseases worse. If you tense up when you are stressed, you may develop neck, shoulder, or low back pain. Stress is linked to high blood pressure and heart disease.  Stress also harms your emotional health. It can make you page, tense, or depressed. Your relationships may suffer, and you may not do well at work or school.  What can you do to manage stress?  You can try these things to help manage stress:   Do something active. Exercise or activity can help reduce stress. Walking is a great way to get started. Even everyday activities such as housecleaning or yard work can help.  Try yoga or mahesh chi. These techniques combine exercise and meditation. You may need some training at first to learn them.  Do something you enjoy. For example, listen to music or go to a movie.  "Practice your hobby or do volunteer work.  Meditate. This can help you relax, because you are not worrying about what happened before or what may happen in the future.  Do guided imagery. Imagine yourself in any setting that helps you feel calm. You can use online videos, books, or a teacher to guide you.  Do breathing exercises. For example:  From a standing position, bend forward from the waist with your knees slightly bent. Let your arms dangle close to the floor.  Breathe in slowly and deeply as you return to a standing position. Roll up slowly and lift your head last.  Hold your breath for just a few seconds in the standing position.  Breathe out slowly and bend forward from the waist.  Let your feelings out. Talk, laugh, cry, and express anger when you need to. Talking with supportive friends or family, a counselor, or a fifi leader about your feelings is a healthy way to relieve stress. Avoid discussing your feelings with people who make you feel worse.  Write. It may help to write about things that are bothering you. This helps you find out how much stress you feel and what is causing it. When you know this, you can find better ways to cope.  What can you do to prevent stress?  You might try some of these things to help prevent stress:  Manage your time. This helps you find time to do the things you want and need to do.  Get enough sleep. Your body recovers from the stresses of the day while you are sleeping.  Get support. Your family, friends, and community can make a difference in how you experience stress.  Limit your news feed. Avoid or limit time on social media or news that may make you feel stressed.  Do something active. Exercise or activity can help reduce stress. Walking is a great way to get started.  Where can you learn more?  Go to https://www.healthwise.net/patiented  Enter N032 in the search box to learn more about \"Learning About Stress.\"  Current as of: October 24, 2023  Content Version: " 14.3    2024 [employer].   Care instructions adapted under license by your healthcare professional. If you have questions about a medical condition or this instruction, always ask your healthcare professional. [employer] disclaims any warranty or liability for your use of this information.

## 2025-01-02 NOTE — PROGRESS NOTES
Preventive Care Visit  [facility]  Martina Luna MD, Family Medicine  Jan 2, 2025  {Provider  Link to [facility] :408951}    {PROVIDER CHARTING PREFERENCE:014857}    Ammon Weber is a 34 year old, presenting for the following:  Physical (Annual exam) and Establish Care (With Martina Waterman MD )        2/5/2024    10:19 AM   Additional Questions   Roomed by Leanne Recinos LPN          HPIAnswers submitted by the patient for this visit:  Patient Health Questionnaire (Submitted on 1/2/2025)  If you checked off any problems, how difficult have these problems made it for you to do your work, take care of things at home, or get along with other people?: Not difficult at all  PHQ9 TOTAL SCORE: 2  Patient Health Questionnaire (G7) (Submitted on 1/2/2025)  ZOEY 7 TOTAL SCORE: 2    Due for a physical and to establish care  Will need med refills  Takes elavil for cyclical emesis, taking 50 mg daily and feels an increased dose would help       {MA/LPN/RN Pre-Provider Visit Orders- hCG/UA/Strep (Optional):519642}  {SUPERLIST (Optional):851939}  {additonal problems for provider to add (Optional):588115}  Health Care Directive  Patient does not have a Health Care Directive: Discussed advance care planning with patient; however, patient declined at this time.      1/2/2025   General Health   How would you rate your overall physical health? Good   Feel stress (tense, anxious, or unable to sleep) To some extent   (!) STRESS CONCERN      1/2/2025   Nutrition   Three or more servings of calcium each day? Yes   Diet: Regular (no restrictions)   How many servings of fruit and vegetables per day? (!) 2-3   How many sweetened beverages each day? 0-1         1/2/2025   Exercise   Days per week of moderate/strenous exercise 4 days         1/2/2025   Social Factors   Frequency of gathering with friends or relatives Three times a week   Worry food won't last until get money to buy more No   Food not  last or not have enough money for food? No   Do you have housing? (Housing is defined as stable permanent housing and does not include staying ouside in a car, in a tent, in an abandoned building, in an overnight shelter, or couch-surfing.) Yes   Are you worried about losing your housing? No   Lack of transportation? No   Unable to get utilities (heat,electricity)? No         1/2/2025   Dental   Dentist two times every year? (!) NO         1/2/2025   TB Screening   Were you born outside of the [place]? No       Today's PHQ-9 Score:       1/2/2025    12:30 PM   PHQ-9 SCORE   PHQ-9 Total Score MyChart 2 (Minimal depression)   PHQ-9 Total Score 2        Patient-reported         1/2/2025   Substance Use   Alcohol more than 3/day or more than 7/wk No   Do you use any other substances recreationally? No     Social History     Tobacco Use    Smoking status: Never     Passive exposure: Never    Smokeless tobacco: Never   Vaping Use    Vaping status: Never Used   Substance Use Topics    Alcohol use: Not Currently     Alcohol/week: 4.2 standard drinks of alcohol     Comment: occassional    Drug use: No     {Provider  If there are gaps in the social history shown above, please follow the link to update and then refresh the note Link to Social and Substance History :718217}     {Mammogram Decision Support (Optional):531118}        1/2/2025   STI Screening   New sexual partner(s) since last STI/HIV test? No     History of abnormal Pap smear: { :569133}             1/2/2025   Contraception/Family Planning   Questions about contraception or family planning No     {Provider  REQUIRED FOR AWV Use the storyboard to review patient history, after sections have been marked as reviewed, refresh note to capture documentation:864599}   Reviewed and updated as needed this visit by Provider       Med Hx  Surg Hx  Fam Hx            {HISTORY OPTIONS (Optional):812853}    {ROS Picklists (Optional):750443}     Objective    Exam  /84 (BP  "Location: Right arm, Patient Position: Sitting, Cuff Size: Adult Large)   Pulse 102   Temp 97.6  F (36.4  C) (Tympanic)   Resp 18   Ht 1.6 m (5' 3\")   Wt 89.1 kg (196 lb 8 oz)   LMP 12/22/2024 (Approximate)   SpO2 99%   BMI 34.81 kg/m     Estimated body mass index is 34.81 kg/m  as calculated from the following:    Height as of this encounter: 1.6 m (5' 3\").    Weight as of this encounter: 89.1 kg (196 lb 8 oz).    Physical Exam  {Exam Choices (Optional):723223}        Signed Electronically by: Martina Luna MD  {Email feedback regarding this note to [email or web address]   :085371}  "

## 2025-03-06 ENCOUNTER — OFFICE VISIT (OUTPATIENT)
Dept: FAMILY MEDICINE | Facility: OTHER | Age: 35
End: 2025-03-06
Attending: PHYSICIAN ASSISTANT
Payer: COMMERCIAL

## 2025-03-06 VITALS
RESPIRATION RATE: 16 BRPM | DIASTOLIC BLOOD PRESSURE: 80 MMHG | SYSTOLIC BLOOD PRESSURE: 134 MMHG | HEART RATE: 123 BPM | BODY MASS INDEX: 35.29 KG/M2 | WEIGHT: 199.2 LBS | OXYGEN SATURATION: 98 % | TEMPERATURE: 98.8 F

## 2025-03-06 DIAGNOSIS — N89.8 VAGINAL ITCHING: ICD-10-CM

## 2025-03-06 DIAGNOSIS — N94.89 LABIAL SWELLING: ICD-10-CM

## 2025-03-06 DIAGNOSIS — Z12.4 SCREENING FOR MALIGNANT NEOPLASM OF CERVIX: Primary | ICD-10-CM

## 2025-03-06 LAB
BACTERIAL VAGINOSIS VAG-IMP: NEGATIVE
C TRACH DNA SPEC QL PROBE+SIG AMP: NEGATIVE
CANDIDA DNA VAG QL NAA+PROBE: NOT DETECTED
CANDIDA GLABRATA / CANDIDA KRUSEI DNA: NOT DETECTED
N GONORRHOEA DNA SPEC QL NAA+PROBE: NEGATIVE
SPECIMEN TYPE: NORMAL
T VAGINALIS DNA VAG QL NAA+PROBE: NOT DETECTED

## 2025-03-06 PROCEDURE — 81515 NFCT DS BV&VAGINITIS DNA ALG: CPT | Mod: ZL | Performed by: PHYSICIAN ASSISTANT

## 2025-03-06 PROCEDURE — 87491 CHLMYD TRACH DNA AMP PROBE: CPT | Mod: ZL | Performed by: PHYSICIAN ASSISTANT

## 2025-03-06 RX ORDER — OMEPRAZOLE 20 MG/1
20 CAPSULE, DELAYED RELEASE ORAL DAILY
COMMUNITY
Start: 2025-01-02

## 2025-03-06 ASSESSMENT — PAIN SCALES - GENERAL: PAINLEVEL_OUTOF10: SEVERE PAIN (7)

## 2025-03-06 NOTE — PROGRESS NOTES
"  Assessment & Plan     Screening for malignant neoplasm of cervix  - HPV and Gynecologic Cytology Panel  - Gynecologic Cytology (PAP)    Labial swelling  Vaginal itching  3 months of labia minora bilateral swelling cyclic with her menstrual cycle occurring week prior as well as during bleeding.  With associated burning and itching without vaginal discharge.  Exam showing bilateral labia minora swelling without significant amount of discharge, rash, wound, lumps.  Differential includes vaginal yeast infection, BV, STD, hormonal cause, labial endometriosis, varices, etc.  Consulted with OB/GYN who agreed with testing and if unremarkable they had recommended trialing a 3-month course of continuous combined oral contraceptive pills for ovulation suppression.  If minimal improvement OB/GYN had recommended following up for further evaluation.  Continue with NSAIDs, alternating warm and cool compress for symptomatic management.   - Multiplex Vaginal Panel by PCR  - GC/Chlamydia by PCR      No follow-ups on file.    Ammon Weber is a 34 year old, presenting for the following health issues:  Groin Swelling    History of Present Illness       Reason for visit:  Swelled vaginal lips and itching. Body aches, shivers, hot/cold  Symptom onset:  1-3 days ago  Symptoms include:  Swollen vaginal lips, pain, irritation, itching, burning. Also have bad body aches and have been having hot/cold flashes.  Symptom intensity:  Moderate  Symptom progression:  Worsening  Had these symptoms before:  Yes  Has tried/received treatment for these symptoms:  No  What makes it worse:  Sitting, walking, whipping after bathroom, doing lots of activities/chores.  What makes it better:  Sometimes warm compress cloth helps but only for a few minutes.   She is taking medications regularly.      Here for evaluation of genital concerns.  Patient reports for the last 3 menstrual cycle she has experienced \"inner lip\" swelling.  Reports symptom " onset approximately 1+ week prior to menstrual bleeding and lasting a few days into the bleeding.  Symptoms then resolved and swelling completely returned to baseline.  Has noticed some burning irritation as well as some itching without significant vaginal discharge changes.  Menstrual cycle has otherwise been at her baseline with regular timing and no significant change in bleeding amount, no clotting.  Patient is not currently on any form of contraceptive.  Reports in the past she has tried combined OCPs as well as Depo-Provera and did not tolerate well reporting it made her menstrual cramps and associated symptoms worse.  Currently sexually active with 1 male partner that she has been with for 10 years.  Does not feel she should have concern for STDs.  Has not used any new body soaps, shampoos, laundry soaps, bedding, clothing.  No other new medications, foods, environmental exposures.  Has tried using warm compress for symptomatic management with does provide short-term relief lasting minutes.       PAST MEDICAL HISTORY:   Past Medical History:   Diagnosis Date    Cyclical vomiting     Helicobacter pylori infection     No Comments Provided       PAST SURGICAL HISTORY:   Past Surgical History:   Procedure Laterality Date    ESOPHAGOSCOPY, GASTROSCOPY, DUODENOSCOPY (EGD), COMBINED N/A 03/18/2022    Procedure: ESOPHAGOGASTRODUODENOSCOPY, WITH BIOPSY;  Surgeon: Osmar Velez MD;  Location: [facility] OR    exision mass retrorectal and coccyx  06/09/2017    Ori Espinoza MD at [facility]       FAMILY HISTORY:   Family History   Problem Relation Age of Onset    No Known Problems Mother     Cancer Father         Cancer, h/o testicular CA    No Known Problems Sister     No Known Problems Brother     Attention Deficit Disorder Brother     Heart Disease Maternal Grandmother         Heart Disease,cardiomyopathy    Blood Disease Maternal Grandmother         Blood Disease,blood dyscrasia    Lung Cancer Paternal Grandfather         SOCIAL HISTORY:   Social History     Tobacco Use    Smoking status: Never     Passive exposure: Never    Smokeless tobacco: Never   Substance Use Topics    Alcohol use: Not Currently     Alcohol/week: 4.2 standard drinks of alcohol     Comment: occassional        Allergies   Allergen Reactions    Other  [No Clinical Screening - See Comments]      Other reaction(s): Throat Irritation  Carmex, Pine, Icy Hot    Menthol      Other reaction(s): Swelling    Pine Tar      Other reaction(s): Runny Nose     Current Outpatient Medications   Medication Sig Dispense Refill    amitriptyline (ELAVIL) 75 MG tablet Take 1 tablet (75 mg) by mouth at bedtime. 90 tablet 4    clonazePAM (KLONOPIN) 1 MG tablet TAKE 1/2 TO 1 TABLET BY MOUTH TWICE DAILY AS NEEDED FOR ANXIETY 60 tablet 5    methylphenidate (RITALIN LA) 40 MG 24 hr capsule Take 1 capsule (40 mg) by mouth daily 30 capsule 0    methylphenidate (RITALIN LA) 40 MG 24 hr capsule Take 1 capsule (40 mg) by mouth daily 30 capsule 0    methylphenidate (RITALIN LA) 40 MG 24 hr capsule Take 1 capsule (40 mg) by mouth every morning 30 capsule 0    naproxen (NAPROSYN) 500 MG tablet Take 1 tablet (500 mg) by mouth 2 times daily as needed for moderate pain. Patient taking as needed 90 tablet 4    omeprazole (PRILOSEC OTC) 20 MG EC tablet Take 1 tablet (20 mg) by mouth daily. 90 tablet 4    omeprazole (PRILOSEC) 20 MG DR capsule Take 20 mg by mouth daily.      Prenatal MV & Min w/FA-DHA ([employer] PRENATAL GUMMY PO) Take 1 chew tab by mouth daily      prochlorperazine (COMPAZINE) 10 MG tablet Take 1 tablet (10 mg) by mouth every 6 hours as needed for nausea or vomiting. 30 tablet 3    triamcinolone (KENALOG) 0.1 % external cream Apply topically 2 times daily. Apply  topically to affected area(s) 3 times daily. 80 g 3    zolpidem ER (AMBIEN CR) 12.5 MG CR tablet Take 1 tablet by mouth daily at 2 pm.       No current facility-administered medications for this visit.           Objective    BP  134/80   Pulse (!) 123   Temp 98.8  F (37.1  C) (Tympanic)   Resp 16   Wt 90.4 kg (199 lb 3.2 oz)   LMP 02/10/2025 (Exact Date)   SpO2 98%   BMI 35.29 kg/m    Body mass index is 35.29 kg/m .  Physical Exam   General: Pleasant, in no apparent distress.  Genitourinary Exam Female: Significant swelling to bilateral labial minora L>R without overlying erythema, bleeding, rash, wound, drainage. No palpable lumps or masses. No significant vaginal discharge. Speculum exam reveals normal appearing vaginal vault and cervix. Normal uterus and adnexa with no masses, nontender urethra and bladder. No cervical motion tenderness.   Skin: No jaundice, pallor, rashes, or lesions.  Psych: Appropriate mood and affect.      Signed Electronically by: Mindy Castelan PA-C

## 2025-03-06 NOTE — NURSING NOTE
"Chief Complaint   Patient presents with    Groin Swelling       Initial /80   Pulse (!) 123   Temp 98.8  F (37.1  C) (Tympanic)   Resp 16   Wt 90.4 kg (199 lb 3.2 oz)   LMP 02/10/2025 (Exact Date)   SpO2 98%   BMI 35.29 kg/m   Estimated body mass index is 35.29 kg/m  as calculated from the following:    Height as of 1/2/25: 1.6 m (5' 3\").    Weight as of this encounter: 90.4 kg (199 lb 3.2 oz).  Medication Review: complete    The next two questions are to help us understand your food security.  If you are feeling you need any assistance in this area, we have resources available to support you today.          1/2/2025   SDOH- Food Insecurity   Within the past 12 months, did you worry that your food would run out before you got money to buy more? N   Within the past 12 months, did the food you bought just not last and you didn t have money to get more? N         Health Care Directive:  Patient does not have a Health Care Directive: Discussed advance care planning with patient; however, patient declined at this time.    Valencia Cabrera LPN      "

## 2025-03-10 LAB
BKR AP ASSOCIATED HPV REPORT: NORMAL
BKR LAB AP GYN ADEQUACY: NORMAL
BKR LAB AP GYN INTERPRETATION: NORMAL
BKR LAB AP PREVIOUS ABNORMAL: NORMAL
PATH REPORT.COMMENTS IMP SPEC: NORMAL
PATH REPORT.COMMENTS IMP SPEC: NORMAL
PATH REPORT.RELEVANT HX SPEC: NORMAL

## 2025-03-13 LAB
HPV HR 12 DNA CVX QL NAA+PROBE: NEGATIVE
HPV16 DNA CVX QL NAA+PROBE: NEGATIVE
HPV18 DNA CVX QL NAA+PROBE: NEGATIVE
HUMAN PAPILLOMA VIRUS FINAL DIAGNOSIS: NORMAL

## (undated) DEVICE — SOL WATER 1500ML

## (undated) DEVICE — TUBING SUCTION 10'X3/16" N510

## (undated) DEVICE — ENDO KIT COMPLIANCE DYKENDOCMPLY

## (undated) DEVICE — SUCTION MANIFOLD NEPTUNE 2 SYS 4 PORT 0702-020-000

## (undated) DEVICE — ENDO BITE BLOCK 60 MAXI LF 00712804

## (undated) DEVICE — SYR 50ML LL W/O NDL 309653

## (undated) DEVICE — Device

## (undated) RX ORDER — MEDROXYPROGESTERONE ACETATE 150 MG/ML
INJECTION, SUSPENSION INTRAMUSCULAR
Status: DISPENSED
Start: 2018-07-09

## (undated) RX ORDER — SODIUM CHLORIDE, SODIUM LACTATE, POTASSIUM CHLORIDE, CALCIUM CHLORIDE 600; 310; 30; 20 MG/100ML; MG/100ML; MG/100ML; MG/100ML
INJECTION, SOLUTION INTRAVENOUS
Status: DISPENSED
Start: 2022-03-18

## (undated) RX ORDER — DEXAMETHASONE SODIUM PHOSPHATE 10 MG/ML
INJECTION, SOLUTION INTRAMUSCULAR; INTRAVENOUS
Status: DISPENSED
Start: 2023-09-20

## (undated) RX ORDER — LIDOCAINE HYDROCHLORIDE 20 MG/ML
INJECTION, SOLUTION EPIDURAL; INFILTRATION; INTRACAUDAL; PERINEURAL
Status: DISPENSED
Start: 2022-03-18

## (undated) RX ORDER — DIPHENHYDRAMINE HYDROCHLORIDE 50 MG/ML
INJECTION INTRAMUSCULAR; INTRAVENOUS
Status: DISPENSED
Start: 2023-09-20

## (undated) RX ORDER — ONDANSETRON 4 MG/1
TABLET, ORALLY DISINTEGRATING ORAL
Status: DISPENSED
Start: 2021-01-05

## (undated) RX ORDER — PROPOFOL 10 MG/ML
INJECTION, EMULSION INTRAVENOUS
Status: DISPENSED
Start: 2022-03-18

## (undated) RX ORDER — KETOROLAC TROMETHAMINE 15 MG/ML
INJECTION, SOLUTION INTRAMUSCULAR; INTRAVENOUS
Status: DISPENSED
Start: 2023-09-20

## (undated) RX ORDER — METOCLOPRAMIDE HYDROCHLORIDE 5 MG/ML
INJECTION INTRAMUSCULAR; INTRAVENOUS
Status: DISPENSED
Start: 2023-09-20

## (undated) RX ORDER — MEDROXYPROGESTERONE ACETATE 150 MG/ML
INJECTION, SUSPENSION INTRAMUSCULAR
Status: DISPENSED
Start: 2018-04-23